# Patient Record
Sex: MALE | Race: WHITE | Employment: FULL TIME | URBAN - METROPOLITAN AREA
[De-identification: names, ages, dates, MRNs, and addresses within clinical notes are randomized per-mention and may not be internally consistent; named-entity substitution may affect disease eponyms.]

---

## 2017-08-03 ENCOUNTER — OFFICE VISIT (OUTPATIENT)
Dept: PHYSICAL THERAPY | Age: 42
End: 2017-08-03
Attending: PHYSICIAN ASSISTANT
Payer: COMMERCIAL

## 2017-08-03 DIAGNOSIS — M47.27 LUMBOSACRAL SPONDYLOSIS WITH RADICULOPATHY: ICD-10-CM

## 2017-08-03 DIAGNOSIS — M54.9 BACK PAIN, UNSPECIFIED BACK LOCATION, UNSPECIFIED BACK PAIN LATERALITY, UNSPECIFIED CHRONICITY: ICD-10-CM

## 2017-08-03 PROCEDURE — 97162 PT EVAL MOD COMPLEX 30 MIN: CPT

## 2017-08-03 PROCEDURE — 97110 THERAPEUTIC EXERCISES: CPT

## 2017-08-03 NOTE — PROGRESS NOTES
SPINE EVALUATION:   Referring Physician: Dr. Kevyn Rosario  Diagnosis: Lumbosacral spondylosis with radiculopathy (M47.27)  Back pain, unspecified back location, unspecified back pain laterality, unspecified chronicity (M54.9)               Comments:  melquiades and R shoulder AC separation. Epilepsy diagnosed at age 15. No seizures since teens, driving. Mild swelling in LE's noted at times.       ASSESSMENT  Pt with constant c/o bilat LE numbness/tingling which was unchanged during eval. Sensation is impaired to li restiction  Special Tests:   SLR: R (-)  L (-)   Babinski (-)              Biggs's Sign (-)    Today’s Treatment and Response:  No complaints. Able to complete exercises.    Patient education provided on back care, posture, pillow support at night, proper or sign and return this letter via fax as soon as possible to [phone]. If you have any questions, please contact me at Dept: [phone]    Sincerely,  Electronically signed by therapist: Meryl Freeman PT    Physician's certification required:  Sergio Teixeira

## 2017-08-09 ENCOUNTER — OFFICE VISIT (OUTPATIENT)
Dept: PHYSICAL THERAPY | Age: 42
End: 2017-08-09
Attending: PHYSICIAN ASSISTANT
Payer: COMMERCIAL

## 2017-08-09 PROCEDURE — 97012 MECHANICAL TRACTION THERAPY: CPT

## 2017-08-09 PROCEDURE — 97112 NEUROMUSCULAR REEDUCATION: CPT

## 2017-08-09 PROCEDURE — 97110 THERAPEUTIC EXERCISES: CPT

## 2017-08-09 NOTE — PROGRESS NOTES
Dx: Lumbosacral spondylosis with radiculopathy (M47.27)  Back pain, unspecified back location, unspecified back pain laterality, unspecified chronicity (M54.9)               Comments:  eval and treat 8 sessions            Authorized # of Visits:  [id number] open/closed         SKTC, DKTC         piriformis lumbar rot stretch 3x each         sidelying lumbar rot mobe grade II 30\" x 4         Intermittent lumbar traction  50/35 lbs  12 mins  60/20 sec                           Skilled Services: pt education, f

## 2017-08-22 ENCOUNTER — OFFICE VISIT (OUTPATIENT)
Dept: PHYSICAL THERAPY | Age: 42
End: 2017-08-22
Attending: FAMILY MEDICINE
Payer: COMMERCIAL

## 2017-08-22 PROCEDURE — 97110 THERAPEUTIC EXERCISES: CPT

## 2017-08-22 PROCEDURE — 97012 MECHANICAL TRACTION THERAPY: CPT

## 2017-08-22 PROCEDURE — 97112 NEUROMUSCULAR REEDUCATION: CPT

## 2017-08-22 NOTE — PROGRESS NOTES
Dx: Lumbosacral spondylosis with radiculopathy (M47.27)  Back pain, unspecified back location, unspecified back pain laterality, unspecified chronicity (M54.9)               Comments:  eval and treat 8 sessions            Authorized # of Visits:  [id number] Tx#: 5/ Date: Tx#: 6/ Date: Tx#: 7/ Date:    Tx#: 8/   Bike x 6' x6'        Stairs 2 flight recip with rail Rhomberg EO/EC, head turns x10        Rocker board  AP x20  ML x20 Rocker board  AP x20  ML x20        Rhomberg stance eyes open/closed Calf st

## 2017-08-24 ENCOUNTER — OFFICE VISIT (OUTPATIENT)
Dept: PHYSICAL THERAPY | Age: 42
End: 2017-08-24
Attending: PHYSICIAN ASSISTANT
Payer: COMMERCIAL

## 2017-08-24 PROCEDURE — 97110 THERAPEUTIC EXERCISES: CPT

## 2017-08-24 PROCEDURE — 97140 MANUAL THERAPY 1/> REGIONS: CPT

## 2017-08-24 PROCEDURE — 97012 MECHANICAL TRACTION THERAPY: CPT

## 2017-08-24 PROCEDURE — 97112 NEUROMUSCULAR REEDUCATION: CPT

## 2017-08-24 NOTE — PROGRESS NOTES
Dx: Lumbosacral spondylosis with radiculopathy (M47.27)  Back pain, unspecified back location, unspecified back pain laterality, unspecified chronicity (M54.9)               Comments:  eval and treat 8 sessions            Authorized # of Visits:  [id number] EO/EC, head turns x10 posture ed back on wall       Rocker board  AP x20  ML x20 Rocker board  AP x20  ML x20 Wall 90/90 holds 10\" x5       Rhomberg stance eyes open/closed Calf stretch off step. 3x each.   Rocker board  AP x25  ML x25       SKTC, DKTC Po

## 2017-08-29 ENCOUNTER — OFFICE VISIT (OUTPATIENT)
Dept: PHYSICAL THERAPY | Age: 42
End: 2017-08-29
Attending: PHYSICIAN ASSISTANT
Payer: COMMERCIAL

## 2017-08-29 PROCEDURE — 97110 THERAPEUTIC EXERCISES: CPT

## 2017-08-29 PROCEDURE — 97012 MECHANICAL TRACTION THERAPY: CPT

## 2017-08-29 PROCEDURE — 97140 MANUAL THERAPY 1/> REGIONS: CPT

## 2017-08-29 PROCEDURE — 97112 NEUROMUSCULAR REEDUCATION: CPT

## 2017-08-29 NOTE — PROGRESS NOTES
Dx: Lumbosacral spondylosis with radiculopathy (M47.27)  Back pain, unspecified back location, unspecified back pain laterality, unspecified chronicity (M54.9)               Comments:  eval and treat 8 sessions            Authorized # of Visits:  [id number] --> met  Able to tolerate sitting at work 30 mins without aggravation of symptoms  --> continued c/o numbness  Able to sleep uninterrupted from pain.   --> met  Improved radicular symptoms 75% with all ADL's.  --> not met    Plan: Pt has completed 5/5 visit reaches  -pect stretch Sciatic nerve glides      sidelying lumbar rot mobe grade II 30\" x 4 Doorway pect stretch.  -single leg lift/TrAb control sidelying lumbar rot mobe  grade III 30\" x 4      Intermittent lumbar traction  50/35 lbs  12 mins  60/20 sec

## 2017-10-19 PROBLEM — M54.16 LUMBAR RADICULITIS: Status: ACTIVE | Noted: 2017-10-19

## 2020-08-06 PROBLEM — M54.16 LUMBAR RADICULITIS: Status: RESOLVED | Noted: 2017-10-19 | Resolved: 2020-08-06

## 2020-09-15 PROBLEM — H81.10 VERTIGO, BENIGN PAROXYSMAL, UNSPECIFIED LATERALITY: Status: ACTIVE | Noted: 2020-09-15

## 2020-11-17 PROBLEM — T88.7XXA MEDICATION SIDE EFFECT: Status: ACTIVE | Noted: 2020-11-17

## 2020-12-01 ENCOUNTER — TELEPHONE (OUTPATIENT)
Dept: SURGERY | Facility: CLINIC | Age: 45
End: 2020-12-01

## 2020-12-01 NOTE — TELEPHONE ENCOUNTER
Pt called stating he was referred to both [facility] & [facility] for \"Abnormal MRI\", please advise who pt should schedule with ([facility], [facility], Onc, etc.); pt awaiting call back

## 2020-12-01 NOTE — TELEPHONE ENCOUNTER
Pt called back, appt scheduled as:    MRA Head: 12/04    Dr. Rajwinder Romo @ Neuro Onc: 12/08 @ 9am - pt did not want to wait until after appt w/[facility], please advise if this is acceptable    Dr. Laura Villarreal: 12/09 @ 1pm

## 2020-12-01 NOTE — TELEPHONE ENCOUNTER
Discussed with provider, Pt needs to have MRA done first, then they should be scheduled to see IR first available then in Neuro Onc as a new patient with next available

## 2020-12-03 ENCOUNTER — TELEPHONE (OUTPATIENT)
Dept: SURGERY | Facility: CLINIC | Age: 45
End: 2020-12-03

## 2020-12-03 NOTE — TELEPHONE ENCOUNTER
Per Dr. Poncho Lozada will be in the OR on 12/8/20, thus patient must be rescheduled. Patient currently scheduled for OV with Dr. Enmanuel Jason on 12/8/20 at 9:00 am, and with Dr. Beck Koroma on 12/9/20 at 1:00 pm.    Routed to [facility] to adjust appointment.

## 2020-12-06 ENCOUNTER — HOSPITAL ENCOUNTER (OUTPATIENT)
Dept: MRI IMAGING | Age: 45
Discharge: HOME | End: 2020-12-06
Attending: Other
Payer: COMMERCIAL

## 2020-12-06 DIAGNOSIS — R90.89 ABNORMAL BRAIN MRI: ICD-10-CM

## 2020-12-06 PROCEDURE — 70546 MR ANGIOGRAPH HEAD W/O&W/DYE: CPT | Performed by: OTHER

## 2020-12-06 PROCEDURE — A9575 INJ GADOTERATE MEGLUMI 0.1ML: HCPCS | Performed by: OTHER

## 2020-12-09 ENCOUNTER — OFFICE VISIT (OUTPATIENT)
Dept: SURGERY | Facility: CLINIC | Age: 45
End: 2020-12-09
Payer: COMMERCIAL

## 2020-12-09 VITALS
WEIGHT: 237 LBS | BODY MASS INDEX: 30.42 KG/M2 | DIASTOLIC BLOOD PRESSURE: 88 MMHG | HEIGHT: 74 IN | HEART RATE: 66 BPM | SYSTOLIC BLOOD PRESSURE: 122 MMHG

## 2020-12-09 DIAGNOSIS — G93.89 RIGHT TEMPORAL LOBE MASS: ICD-10-CM

## 2020-12-09 DIAGNOSIS — G40.109 PARTIAL SYMPTOMATIC EPILEPSY WITH SIMPLE PARTIAL SEIZURES, NOT INTRACTABLE, WITHOUT STATUS EPILEPTICUS (HCC): Primary | ICD-10-CM

## 2020-12-09 DIAGNOSIS — G08 CHRONIC CEREBRAL VENOUS SINUS THROMBOSIS: Primary | ICD-10-CM

## 2020-12-09 PROCEDURE — 99202 OFFICE O/P NEW SF 15 MIN: CPT | Performed by: NEUROLOGICAL SURGERY

## 2020-12-09 PROCEDURE — 3079F DIAST BP 80-89 MM HG: CPT | Performed by: RADIOLOGY

## 2020-12-09 PROCEDURE — 3074F SYST BP LT 130 MM HG: CPT | Performed by: RADIOLOGY

## 2020-12-09 PROCEDURE — 99202 OFFICE O/P NEW SF 15 MIN: CPT | Performed by: RADIOLOGY

## 2020-12-09 PROCEDURE — 3008F BODY MASS INDEX DOCD: CPT | Performed by: RADIOLOGY

## 2020-12-09 RX ORDER — ALUMINUM CHLORIDE 15 G/100ML
SOLUTION TOPICAL
COMMUNITY
Start: 2020-08-11 | End: 2020-12-09

## 2020-12-09 NOTE — PROGRESS NOTES
Patient with hx of seizures here for consult regarding abnormal MRI brain. MRA head on 12/06/2020. Notes recent dilantin toxicity, has changed medication to keppra which has been working well for him.     Review of Systems:    Hand Dominance: right  General

## 2020-12-09 NOTE — PROGRESS NOTES
39yo hx epilepsy, one GTCS but mostly complex partial seizures since 13yo but no record of remote MR/CT head to review. Has been on dilantin for decades. No recent headaches, change in seizures or neurolgical deficit.  He established care with a neurologist Solution, apply externally once daily (give 2 bottles), Disp: 35 mL, Rfl: 6    No current facility-administered medications on file prior to visit.        Tegretol Florentinus.Paradise*        Social History    Socioeconomic History      Marital status:  MRA head in 3 months to make sure no progressive thrombosis and screen for dAVF. If stable then would do an annual MRA head to screen for dAVF. Given his other issue, seeing Neurosurgery for R temporal mass, he may need other work up.  Therefore I will a

## 2020-12-09 NOTE — H&P
Janet  Neurosurgery Consult    REASON FOR CONSULTATION:  Right temporal mass    HISTORY OF PRESENT ILLNESS:  Saqib  is a(n) 39year old RH male with a history of epilepsy since age 15.  Currently seeing Dr. Erica Cronin for transit is intact. Tongue is midline. Uvula and palate elevate symmetrically.      Motor: 5/5 x 4, no drift   Sensation: intact to light touch bilaterally     Reflexes: 2+   Coordination: FNF intact   Gait: normal       DIAGNOSTIC DATA:      IMAGING:  MRI brain wi

## 2020-12-16 ENCOUNTER — PATIENT MESSAGE (OUTPATIENT)
Dept: SURGERY | Facility: CLINIC | Age: 45
End: 2020-12-16

## 2020-12-16 NOTE — TELEPHONE ENCOUNTER
From: Faustino Coyne  To: Venecia Otero MD  Sent: 12/16/2020 1:35 PM CST  Subject: Visit Follow-up Question    Just wanted to see if you will be in the office on March 3rd.  Appointment set with Dr. Stas Pickett and was hoping to see you immediately before [facility]

## 2021-01-15 PROBLEM — G08: Status: ACTIVE | Noted: 2021-01-15

## 2021-01-19 ENCOUNTER — TELEPHONE (OUTPATIENT)
Dept: SURGERY | Facility: CLINIC | Age: 46
End: 2021-01-19

## 2021-02-15 ENCOUNTER — PATIENT MESSAGE (OUTPATIENT)
Dept: SURGERY | Facility: CLINIC | Age: 46
End: 2021-02-15

## 2021-02-16 ENCOUNTER — TELEPHONE (OUTPATIENT)
Dept: SURGERY | Facility: CLINIC | Age: 46
End: 2021-02-16

## 2021-02-16 NOTE — TELEPHONE ENCOUNTER
Jazmine with pt's insurance called stating pt will be completing MRI & MRA @ [facility]; Jazmine requested orders to be faxed to [facility] @ [phone]

## 2021-02-17 NOTE — TELEPHONE ENCOUNTER
Asked pt via [employer] to bring CD of imaging to apt with Dr Wayne Ramirez next week for Dr Cristo Gregg to review.

## 2021-02-17 NOTE — TELEPHONE ENCOUNTER
From: Komal Zamudio  To: Marina Narvaez MD  Sent: 2/15/2021 7:14 PM CST  Subject: Non-Urgent Medical Question    I am getting scans this Friday and was told you won't be available until March to discuss. Please speak to Dr. Emily Sue.  Reach out to me via p

## 2021-02-22 ENCOUNTER — TELEPHONE (OUTPATIENT)
Dept: SURGERY | Facility: CLINIC | Age: 46
End: 2021-02-22

## 2021-02-22 DIAGNOSIS — D49.6 BRAIN TUMOR (HCC): Primary | ICD-10-CM

## 2021-02-22 NOTE — TELEPHONE ENCOUNTER
You are scheduled for Right craniotomy for tumor resection on 3/11/21 with     Pre-op instructions discussed with patient and surgical packet provided:    · PCP clearance is needed.   We have faxed a letter requesting medical clearance to their of surgery. · Our office will get prior authorization for your surgery. · You will be in the Intensive Care Unit overnight, it is an average 3-5 days inpatient stay.     · You can expect to have some facial swelling on side of jerome

## 2021-02-22 NOTE — TELEPHONE ENCOUNTER
Report only of MRI Brain WWO, and MRA Brain received, both dated 02/20/21, endorsed to provider for review

## 2021-02-22 NOTE — TELEPHONE ENCOUNTER
Patient is scheduled for right craniotomy for tumor resection on 3/11/21 with Dr Wayne Ramirez.     X Surgery order signed   X Placed sx on surgery sheet  X Placed on outlook calendar  X [employer] message sent to patient with sx instructions  X Faxed pre-op trudy

## 2021-02-23 NOTE — TELEPHONE ENCOUNTER
Peterson Hallman and spoke with [employer] in the PA department. [phone].     Prior authorization request completed for: right craniotomy for tumor resection  Authorization #[id number]  Authorization dates: 3/11/21 - 3/14/21  CPT codes approved: 79940,65351,5

## 2021-02-24 ENCOUNTER — OFFICE VISIT (OUTPATIENT)
Dept: SURGERY | Facility: CLINIC | Age: 46
End: 2021-02-24
Payer: COMMERCIAL

## 2021-02-24 VITALS
DIASTOLIC BLOOD PRESSURE: 70 MMHG | SYSTOLIC BLOOD PRESSURE: 112 MMHG | WEIGHT: 200 LBS | HEART RATE: 64 BPM | BODY MASS INDEX: 25.67 KG/M2 | HEIGHT: 74 IN

## 2021-02-24 DIAGNOSIS — G93.89 RIGHT TEMPORAL LOBE MASS: Primary | ICD-10-CM

## 2021-02-24 PROCEDURE — 99213 OFFICE O/P EST LOW 20 MIN: CPT | Performed by: NEUROLOGICAL SURGERY

## 2021-02-24 PROCEDURE — 3078F DIAST BP <80 MM HG: CPT | Performed by: NEUROLOGICAL SURGERY

## 2021-02-24 PROCEDURE — 3074F SYST BP LT 130 MM HG: CPT | Performed by: NEUROLOGICAL SURGERY

## 2021-02-24 PROCEDURE — 3008F BODY MASS INDEX DOCD: CPT | Performed by: NEUROLOGICAL SURGERY

## 2021-02-24 NOTE — PROGRESS NOTES
[facility]  Neurosurgery Clinic Visit      HISTORY OF PRESENT ILLNESS:  Emiliano Sanders is a(n) 39year old male with epilepsy, right temporal tumor here for follow-up.   Since his last visit, he has had only a few days of headache when h drift   Sensation: intact to light touch bilaterally     Reflexes: deferred   Coordination: FNF intact   Gait: normal       DIAGNOSTIC DATA:      IMAGING:  MRI brain with stable cystic anterior right temporal lesion, 1.7 x 1.5 x 0.9 cm with no surrounding

## 2021-02-24 NOTE — PROGRESS NOTES
Patient here for follow-up and imaging review. Discs dropped off at . EEG completed. Here for next steps regarding surgery on 03/11.

## 2021-02-27 ENCOUNTER — PATIENT MESSAGE (OUTPATIENT)
Dept: SURGERY | Facility: CLINIC | Age: 46
End: 2021-02-27

## 2021-03-01 NOTE — TELEPHONE ENCOUNTER
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  The S

## 2021-03-01 NOTE — TELEPHONE ENCOUNTER
From: [facility]  To: Violet Chaparro MD  Sent: 2/27/2021 7:32 AM CST  Subject: Visit Follow-up Question    Please disregard previous message.  I have an appointment scheduled with Dr. Shilo Pedro March 8th and was planning on canceling because I have a Covid

## 2021-03-02 DIAGNOSIS — D49.6 BRAIN TUMOR (HCC): Primary | ICD-10-CM

## 2021-03-02 DIAGNOSIS — Z01.818 PREOP TESTING: ICD-10-CM

## 2021-03-03 NOTE — TELEPHONE ENCOUNTER
Per PCP- on 3/2/21: Carlos Rangel is a 39year old male who presents for a pre-operative physical exam. Patient is to have right craniotomy, to be done by Dr. Marialuisa Roy at [facility] on 3/11/2021. Pt has the following conditions: Seizure.  Pt

## 2021-03-03 NOTE — PROGRESS NOTES
Per : \"Discussed with patient, he will have MRI with stealth protocol done preop. \"    MRI currently scheduled for 3/10/21. Nothing further needed.

## 2021-03-03 NOTE — TELEPHONE ENCOUNTER
Jose Manuel Michelle, according to Dr. Grey Savage note he was to also get an MRA of the brain with his MRI. I only see the MRI completed. Can you check with patient and make sure he has it completed. If he needs orders let me know.

## 2021-03-05 ENCOUNTER — TELEPHONE (OUTPATIENT)
Dept: NEUROLOGY | Facility: CLINIC | Age: 46
End: 2021-03-05

## 2021-03-05 NOTE — TELEPHONE ENCOUNTER
Renu Ching for peer to peer     Spoke with Carter     Peer to peer schedule for 3/8/21 at [place] [address] updated

## 2021-03-08 ENCOUNTER — LAB ENCOUNTER (OUTPATIENT)
Dept: LAB | Age: 46
End: 2021-03-08
Attending: NEUROLOGICAL SURGERY
Payer: COMMERCIAL

## 2021-03-08 DIAGNOSIS — D49.6 BRAIN TUMOR (HCC): ICD-10-CM

## 2021-03-09 LAB — SARS-COV-2 RNA RESP QL NAA+PROBE: NOT DETECTED

## 2021-03-10 ENCOUNTER — ANESTHESIA EVENT (OUTPATIENT)
Dept: SURGERY | Facility: HOSPITAL | Age: 46
DRG: 027 | End: 2021-03-10
Payer: COMMERCIAL

## 2021-03-10 ENCOUNTER — HOSPITAL ENCOUNTER (OUTPATIENT)
Dept: MRI IMAGING | Facility: HOSPITAL | Age: 46
Discharge: HOME | End: 2021-03-10
Attending: NEUROLOGICAL SURGERY
Payer: COMMERCIAL

## 2021-03-10 DIAGNOSIS — Z01.818 PREOP TESTING: ICD-10-CM

## 2021-03-10 DIAGNOSIS — G93.89 RIGHT TEMPORAL LOBE MASS: ICD-10-CM

## 2021-03-10 DIAGNOSIS — G40.109 PARTIAL SYMPTOMATIC EPILEPSY WITH SIMPLE PARTIAL SEIZURES, NOT INTRACTABLE, WITHOUT STATUS EPILEPTICUS (HCC): ICD-10-CM

## 2021-03-10 DIAGNOSIS — D49.6 BRAIN TUMOR (HCC): ICD-10-CM

## 2021-03-10 PROCEDURE — A9575 INJ GADOTERATE MEGLUMI 0.1ML: HCPCS | Performed by: NEUROLOGICAL SURGERY

## 2021-03-10 PROCEDURE — 70553 MRI BRAIN STEM W/O & W/DYE: CPT | Performed by: NEUROLOGICAL SURGERY

## 2021-03-10 NOTE — ANESTHESIA PREPROCEDURE EVALUATION
PRE-OP EVALUATION    Patient Name: [facility]    Pre-op Diagnosis: Brain tumor ([facility] [address] [D49.6]    Procedure(s):  Right craniotomy for tumor resection      Surgeon(s) and Role:     Yaneth Ogden MD - Primary    Pre-op vitals reviewed.         Body mass  02/23/2021    CO2 22.7 02/23/2021    BUN 19.0 02/23/2021    CREATSERUM 0.62 (L) 02/23/2021    GLU 86 02/23/2021    CA 9.9 02/23/2021     Lab Results   Component Value Date    INR 1.0 02/23/2021         Airway      Mallampati: II  Mouth opening: >

## 2021-03-11 ENCOUNTER — ANESTHESIA (OUTPATIENT)
Dept: SURGERY | Facility: HOSPITAL | Age: 46
DRG: 027 | End: 2021-03-11
Payer: COMMERCIAL

## 2021-03-11 ENCOUNTER — HOSPITAL ENCOUNTER (INPATIENT)
Facility: HOSPITAL | Age: 46
LOS: 2 days | Discharge: HOME | DRG: 027 | End: 2021-03-13
Attending: NEUROLOGICAL SURGERY | Admitting: NEUROLOGICAL SURGERY
Payer: COMMERCIAL

## 2021-03-11 DIAGNOSIS — D49.6 BRAIN TUMOR (HCC): Primary | ICD-10-CM

## 2021-03-11 LAB
ANTIBODY SCREEN: NEGATIVE
RH BLOOD TYPE: NEGATIVE

## 2021-03-11 PROCEDURE — 76942 ECHO GUIDE FOR BIOPSY: CPT | Performed by: ANESTHESIOLOGY

## 2021-03-11 PROCEDURE — 3079F DIAST BP 80-89 MM HG: CPT | Performed by: INTERNAL MEDICINE

## 2021-03-11 PROCEDURE — 00B70ZZ EXCISION OF CEREBRAL HEMISPHERE, OPEN APPROACH: ICD-10-PCS | Performed by: NEUROLOGICAL SURGERY

## 2021-03-11 PROCEDURE — 3074F SYST BP LT 130 MM HG: CPT | Performed by: INTERNAL MEDICINE

## 2021-03-11 PROCEDURE — P9045 ALBUMIN (HUMAN), 5%, 250 ML: HCPCS | Performed by: ANESTHESIOLOGY

## 2021-03-11 PROCEDURE — 99291 CRITICAL CARE FIRST HOUR: CPT | Performed by: INTERNAL MEDICINE

## 2021-03-11 PROCEDURE — 3008F BODY MASS INDEX DOCD: CPT | Performed by: INTERNAL MEDICINE

## 2021-03-11 PROCEDURE — 8E09XBZ COMPUTER ASSISTED PROCEDURE OF HEAD AND NECK REGION: ICD-10-PCS | Performed by: NEUROLOGICAL SURGERY

## 2021-03-11 DEVICE — PATCH DURAL DURAMATRIX 2X2: Type: IMPLANTABLE DEVICE | Site: HEAD | Status: FUNCTIONAL

## 2021-03-11 DEVICE — SCREW, AXS, SELF-DRILLING
Type: IMPLANTABLE DEVICE | Site: HEAD | Status: FUNCTIONAL
Brand: UNIVERSAL NEURO 3

## 2021-03-11 DEVICE — BURR HOLE COVER, WITH TAB, 10MM
Type: IMPLANTABLE DEVICE | Site: HEAD | Status: FUNCTIONAL
Brand: UNIVERSAL NEURO 3

## 2021-03-11 RX ORDER — PANTOPRAZOLE SODIUM 40 MG/1
40 TABLET, DELAYED RELEASE ORAL
Status: DISCONTINUED | OUTPATIENT
Start: 2021-03-11 | End: 2021-03-11

## 2021-03-11 RX ORDER — SODIUM CHLORIDE, SODIUM LACTATE, POTASSIUM CHLORIDE, CALCIUM CHLORIDE 600; 310; 30; 20 MG/100ML; MG/100ML; MG/100ML; MG/100ML
INJECTION, SOLUTION INTRAVENOUS CONTINUOUS
Status: DISCONTINUED | OUTPATIENT
Start: 2021-03-11 | End: 2021-03-11

## 2021-03-11 RX ORDER — SODIUM CHLORIDE 9 MG/ML
INJECTION, SOLUTION INTRAVENOUS CONTINUOUS
Status: DISCONTINUED | OUTPATIENT
Start: 2021-03-11 | End: 2021-03-11

## 2021-03-11 RX ORDER — HYDROCODONE BITARTRATE AND ACETAMINOPHEN 5; 325 MG/1; MG/1
1 TABLET ORAL EVERY 4 HOURS PRN
Status: DISCONTINUED | OUTPATIENT
Start: 2021-03-11 | End: 2021-03-13

## 2021-03-11 RX ORDER — ALBUMIN, HUMAN INJ 5% 5 %
SOLUTION INTRAVENOUS CONTINUOUS PRN
Status: DISCONTINUED | OUTPATIENT
Start: 2021-03-11 | End: 2021-03-11 | Stop reason: SURG

## 2021-03-11 RX ORDER — SODIUM CHLORIDE 0.9 % (FLUSH) 0.9 %
SYRINGE (ML) INJECTION AS NEEDED
Status: DISCONTINUED | OUTPATIENT
Start: 2021-03-11 | End: 2021-03-11 | Stop reason: HOSPADM

## 2021-03-11 RX ORDER — ACETAMINOPHEN 500 MG
1000 TABLET ORAL ONCE AS NEEDED
Status: DISCONTINUED | OUTPATIENT
Start: 2021-03-11 | End: 2021-03-11 | Stop reason: ALTCHOICE

## 2021-03-11 RX ORDER — HYDROCODONE BITARTRATE AND ACETAMINOPHEN 5; 325 MG/1; MG/1
2 TABLET ORAL EVERY 4 HOURS PRN
Status: DISCONTINUED | OUTPATIENT
Start: 2021-03-11 | End: 2021-03-13

## 2021-03-11 RX ORDER — HYDROMORPHONE HYDROCHLORIDE 1 MG/ML
0.2 INJECTION, SOLUTION INTRAMUSCULAR; INTRAVENOUS; SUBCUTANEOUS EVERY 2 HOUR PRN
Status: DISCONTINUED | OUTPATIENT
Start: 2021-03-11 | End: 2021-03-13

## 2021-03-11 RX ORDER — HYDROMORPHONE HYDROCHLORIDE 1 MG/ML
0.8 INJECTION, SOLUTION INTRAMUSCULAR; INTRAVENOUS; SUBCUTANEOUS EVERY 2 HOUR PRN
Status: DISCONTINUED | OUTPATIENT
Start: 2021-03-11 | End: 2021-03-13

## 2021-03-11 RX ORDER — DOCUSATE SODIUM 100 MG/1
100 CAPSULE, LIQUID FILLED ORAL 2 TIMES DAILY
Status: DISCONTINUED | OUTPATIENT
Start: 2021-03-11 | End: 2021-03-11

## 2021-03-11 RX ORDER — FLUTICASONE PROPIONATE 50 MCG
2 SPRAY, SUSPENSION (ML) NASAL DAILY
Status: DISCONTINUED | OUTPATIENT
Start: 2021-03-12 | End: 2021-03-13

## 2021-03-11 RX ORDER — MANNITOL 20 G/100ML
INJECTION, SOLUTION INTRAVENOUS AS NEEDED
Status: DISCONTINUED | OUTPATIENT
Start: 2021-03-11 | End: 2021-03-11 | Stop reason: SURG

## 2021-03-11 RX ORDER — CEFAZOLIN SODIUM/WATER 2 G/20 ML
2 SYRINGE (ML) INTRAVENOUS ONCE
Status: COMPLETED | OUTPATIENT
Start: 2021-03-11 | End: 2021-03-11

## 2021-03-11 RX ORDER — LABETALOL HYDROCHLORIDE 5 MG/ML
5 INJECTION, SOLUTION INTRAVENOUS EVERY 5 MIN PRN
Status: ACTIVE | OUTPATIENT
Start: 2021-03-11 | End: 2021-03-11

## 2021-03-11 RX ORDER — ACETAMINOPHEN 500 MG
1000 TABLET ORAL ONCE
Status: DISCONTINUED | OUTPATIENT
Start: 2021-03-11 | End: 2021-03-11 | Stop reason: ALTCHOICE

## 2021-03-11 RX ORDER — BACITRACIN 50000 [USP'U]/1
INJECTION, POWDER, LYOPHILIZED, FOR SOLUTION INTRAMUSCULAR AS NEEDED
Status: DISCONTINUED | OUTPATIENT
Start: 2021-03-11 | End: 2021-03-11 | Stop reason: HOSPADM

## 2021-03-11 RX ORDER — ACETAMINOPHEN 500 MG
1000 TABLET ORAL EVERY 6 HOURS PRN
COMMUNITY
End: 2021-03-26 | Stop reason: ALTCHOICE

## 2021-03-11 RX ORDER — DEXAMETHASONE SODIUM PHOSPHATE 4 MG/ML
VIAL (ML) INJECTION AS NEEDED
Status: DISCONTINUED | OUTPATIENT
Start: 2021-03-11 | End: 2021-03-11 | Stop reason: SURG

## 2021-03-11 RX ORDER — LEVETIRACETAM 500 MG/1
1000 TABLET ORAL 2 TIMES DAILY
Status: DISCONTINUED | OUTPATIENT
Start: 2021-03-11 | End: 2021-03-13

## 2021-03-11 RX ORDER — NALOXONE HYDROCHLORIDE 0.4 MG/ML
80 INJECTION, SOLUTION INTRAMUSCULAR; INTRAVENOUS; SUBCUTANEOUS AS NEEDED
Status: ACTIVE | OUTPATIENT
Start: 2021-03-11 | End: 2021-03-11

## 2021-03-11 RX ORDER — LEVETIRACETAM 500 MG/5ML
1000 INJECTION, SOLUTION, CONCENTRATE INTRAVENOUS ONCE
Status: DISCONTINUED | OUTPATIENT
Start: 2021-03-11 | End: 2021-03-11

## 2021-03-11 RX ORDER — HYDRALAZINE HYDROCHLORIDE 20 MG/ML
10 INJECTION INTRAMUSCULAR; INTRAVENOUS EVERY 4 HOURS PRN
Status: DISCONTINUED | OUTPATIENT
Start: 2021-03-11 | End: 2021-03-13

## 2021-03-11 RX ORDER — LIDOCAINE HYDROCHLORIDE AND EPINEPHRINE 10; 10 MG/ML; UG/ML
INJECTION, SOLUTION INFILTRATION; PERINEURAL AS NEEDED
Status: DISCONTINUED | OUTPATIENT
Start: 2021-03-11 | End: 2021-03-11 | Stop reason: HOSPADM

## 2021-03-11 RX ORDER — ONDANSETRON 2 MG/ML
4 INJECTION INTRAMUSCULAR; INTRAVENOUS EVERY 6 HOURS PRN
Status: DISCONTINUED | OUTPATIENT
Start: 2021-03-11 | End: 2021-03-13

## 2021-03-11 RX ORDER — ONDANSETRON 2 MG/ML
4 INJECTION INTRAMUSCULAR; INTRAVENOUS AS NEEDED
Status: ACTIVE | OUTPATIENT
Start: 2021-03-11 | End: 2021-03-11

## 2021-03-11 RX ORDER — ONDANSETRON 2 MG/ML
INJECTION INTRAMUSCULAR; INTRAVENOUS AS NEEDED
Status: DISCONTINUED | OUTPATIENT
Start: 2021-03-11 | End: 2021-03-11 | Stop reason: SURG

## 2021-03-11 RX ORDER — HYDROCODONE BITARTRATE AND ACETAMINOPHEN 5; 325 MG/1; MG/1
1 TABLET ORAL AS NEEDED
Status: DISCONTINUED | OUTPATIENT
Start: 2021-03-11 | End: 2021-03-13

## 2021-03-11 RX ORDER — METOCLOPRAMIDE HYDROCHLORIDE 5 MG/ML
INJECTION INTRAMUSCULAR; INTRAVENOUS AS NEEDED
Status: DISCONTINUED | OUTPATIENT
Start: 2021-03-11 | End: 2021-03-11 | Stop reason: SURG

## 2021-03-11 RX ORDER — SODIUM CHLORIDE, SODIUM LACTATE, POTASSIUM CHLORIDE, CALCIUM CHLORIDE 600; 310; 30; 20 MG/100ML; MG/100ML; MG/100ML; MG/100ML
INJECTION, SOLUTION INTRAVENOUS CONTINUOUS PRN
Status: DISCONTINUED | OUTPATIENT
Start: 2021-03-11 | End: 2021-03-11

## 2021-03-11 RX ORDER — MIDAZOLAM HYDROCHLORIDE 1 MG/ML
INJECTION INTRAMUSCULAR; INTRAVENOUS AS NEEDED
Status: DISCONTINUED | OUTPATIENT
Start: 2021-03-11 | End: 2021-03-11 | Stop reason: SURG

## 2021-03-11 RX ORDER — MEPERIDINE HYDROCHLORIDE 25 MG/ML
12.5 INJECTION INTRAMUSCULAR; INTRAVENOUS; SUBCUTANEOUS AS NEEDED
Status: DISCONTINUED | OUTPATIENT
Start: 2021-03-11 | End: 2021-03-13

## 2021-03-11 RX ORDER — DIAZEPAM 5 MG/ML
2 INJECTION, SOLUTION INTRAMUSCULAR; INTRAVENOUS EVERY 6 HOURS PRN
Status: DISCONTINUED | OUTPATIENT
Start: 2021-03-11 | End: 2021-03-13

## 2021-03-11 RX ORDER — HYDROMORPHONE HYDROCHLORIDE 1 MG/ML
0.4 INJECTION, SOLUTION INTRAMUSCULAR; INTRAVENOUS; SUBCUTANEOUS EVERY 5 MIN PRN
Status: DISPENSED | OUTPATIENT
Start: 2021-03-11 | End: 2021-03-11

## 2021-03-11 RX ORDER — HYDROCODONE BITARTRATE AND ACETAMINOPHEN 5; 325 MG/1; MG/1
2 TABLET ORAL AS NEEDED
Status: DISCONTINUED | OUTPATIENT
Start: 2021-03-11 | End: 2021-03-13

## 2021-03-11 RX ORDER — HYDROMORPHONE HYDROCHLORIDE 1 MG/ML
0.4 INJECTION, SOLUTION INTRAMUSCULAR; INTRAVENOUS; SUBCUTANEOUS EVERY 2 HOUR PRN
Status: DISCONTINUED | OUTPATIENT
Start: 2021-03-11 | End: 2021-03-13

## 2021-03-11 RX ORDER — LIDOCAINE HYDROCHLORIDE 10 MG/ML
INJECTION, SOLUTION EPIDURAL; INFILTRATION; INTRACAUDAL; PERINEURAL AS NEEDED
Status: DISCONTINUED | OUTPATIENT
Start: 2021-03-11 | End: 2021-03-11 | Stop reason: SURG

## 2021-03-11 RX ORDER — REMIFENTANIL HYDROCHLORIDE 1 MG/ML
INJECTION, POWDER, LYOPHILIZED, FOR SOLUTION INTRAVENOUS AS NEEDED
Status: DISCONTINUED | OUTPATIENT
Start: 2021-03-11 | End: 2021-03-11 | Stop reason: SURG

## 2021-03-11 RX ORDER — ROCURONIUM BROMIDE 10 MG/ML
INJECTION, SOLUTION INTRAVENOUS AS NEEDED
Status: DISCONTINUED | OUTPATIENT
Start: 2021-03-11 | End: 2021-03-11 | Stop reason: SURG

## 2021-03-11 RX ORDER — LABETALOL HYDROCHLORIDE 5 MG/ML
10 INJECTION, SOLUTION INTRAVENOUS AS NEEDED
Status: DISCONTINUED | OUTPATIENT
Start: 2021-03-11 | End: 2021-03-13

## 2021-03-11 RX ORDER — DEXAMETHASONE SODIUM PHOSPHATE 4 MG/ML
4 VIAL (ML) INJECTION AS NEEDED
Status: ACTIVE | OUTPATIENT
Start: 2021-03-11 | End: 2021-03-11

## 2021-03-11 RX ORDER — CEFAZOLIN SODIUM/WATER 2 G/20 ML
2 SYRINGE (ML) INTRAVENOUS EVERY 8 HOURS
Status: COMPLETED | OUTPATIENT
Start: 2021-03-11 | End: 2021-03-12

## 2021-03-11 RX ORDER — SODIUM CHLORIDE 9 MG/ML
INJECTION, SOLUTION INTRAVENOUS CONTINUOUS
Status: DISCONTINUED | OUTPATIENT
Start: 2021-03-11 | End: 2021-03-13

## 2021-03-11 RX ADMIN — ALBUMIN, HUMAN INJ 5%: 5 SOLUTION INTRAVENOUS at 10:40:00

## 2021-03-11 RX ADMIN — ONDANSETRON 4 MG: 2 INJECTION INTRAMUSCULAR; INTRAVENOUS at 12:40:00

## 2021-03-11 RX ADMIN — CEFAZOLIN SODIUM/WATER 2 G: 2 G/20 ML SYRINGE (ML) INTRAVENOUS at 11:59:00

## 2021-03-11 RX ADMIN — ROCURONIUM BROMIDE 10 MG: 10 INJECTION, SOLUTION INTRAVENOUS at 10:37:00

## 2021-03-11 RX ADMIN — DEXAMETHASONE SODIUM PHOSPHATE 8 MG: 4 MG/ML VIAL (ML) INJECTION at 08:21:00

## 2021-03-11 RX ADMIN — ROCURONIUM BROMIDE 50 MG: 10 INJECTION, SOLUTION INTRAVENOUS at 07:34:00

## 2021-03-11 RX ADMIN — CEFAZOLIN SODIUM/WATER 2 G: 2 G/20 ML SYRINGE (ML) INTRAVENOUS at 08:00:00

## 2021-03-11 RX ADMIN — MANNITOL 125 ML: 20 INJECTION, SOLUTION INTRAVENOUS at 08:00:00

## 2021-03-11 RX ADMIN — METOCLOPRAMIDE HYDROCHLORIDE 10 MG: 5 INJECTION INTRAMUSCULAR; INTRAVENOUS at 08:00:00

## 2021-03-11 RX ADMIN — ROCURONIUM BROMIDE 20 MG: 10 INJECTION, SOLUTION INTRAVENOUS at 09:10:00

## 2021-03-11 RX ADMIN — REMIFENTANIL HYDROCHLORIDE 100 MCG: 1 INJECTION, POWDER, LYOPHILIZED, FOR SOLUTION INTRAVENOUS at 07:52:00

## 2021-03-11 RX ADMIN — LIDOCAINE HYDROCHLORIDE 100 MG: 10 INJECTION, SOLUTION EPIDURAL; INFILTRATION; INTRACAUDAL; PERINEURAL at 07:29:00

## 2021-03-11 RX ADMIN — SODIUM CHLORIDE: 9 INJECTION, SOLUTION INTRAVENOUS at 11:40:00

## 2021-03-11 RX ADMIN — LIDOCAINE HYDROCHLORIDE 50 MG: 10 INJECTION, SOLUTION EPIDURAL; INFILTRATION; INTRACAUDAL; PERINEURAL at 12:40:00

## 2021-03-11 RX ADMIN — SODIUM CHLORIDE: 9 INJECTION, SOLUTION INTRAVENOUS at 13:15:00

## 2021-03-11 RX ADMIN — SODIUM CHLORIDE: 9 INJECTION, SOLUTION INTRAVENOUS at 07:29:00

## 2021-03-11 RX ADMIN — ROCURONIUM BROMIDE 20 MG: 10 INJECTION, SOLUTION INTRAVENOUS at 08:28:00

## 2021-03-11 RX ADMIN — ONDANSETRON 4 MG: 2 INJECTION INTRAMUSCULAR; INTRAVENOUS at 08:00:00

## 2021-03-11 RX ADMIN — MIDAZOLAM HYDROCHLORIDE 2 MG: 1 INJECTION INTRAMUSCULAR; INTRAVENOUS at 07:29:00

## 2021-03-11 RX ADMIN — SODIUM CHLORIDE: 9 INJECTION, SOLUTION INTRAVENOUS at 09:40:00

## 2021-03-11 NOTE — H&P
Neurosurgery Pre-OP H&P  3/11/2021    Batsheva Mazariegos PCP:  Cayetano Johnston DO    1975 MRN [id number]       HISTORY OF PRESENT ILLNESS:  Batsheva Mazariegos is a(n) 39year old male with epilepsy, right temporal tumor here for follow-up.   Since his last vis skin.     MEDICATIONS:    Prescriptions Prior to Admission   (Not in a hospital admission)      No current facility-administered medications for this visit.         REVIEW OF SYSTEMS:  A 10-point system was reviewed.   Pertinent positives and negatives are [address]  3/11/2021  7:18 AM

## 2021-03-11 NOTE — ANESTHESIA PROCEDURE NOTES
Arterial Line  Performed by: Iris Villanueva MD  Authorized by: Iris Villanueva MD     General Information and Staff    Procedure Start:  3/11/2021 7:40 AM  Procedure End:  3/11/2021 7:41 AM  Anesthesiologist:  Iris Villanueva MD  Perfo

## 2021-03-11 NOTE — ANESTHESIA PROCEDURE NOTES
Airway  Urgency: elective      General Information and Staff    Patient location during procedure: OR  Anesthesiologist: Woodrow Loaiza MD  Performed: anesthesiologist     Indications and Patient Condition  Indications for airway management: anesthe

## 2021-03-11 NOTE — BRIEF OP NOTE
Pre-Operative Diagnosis: Brain tumor ([facility] [address] [D49.6]     Post-Operative Diagnosis: Brain tumor ([facility] [address] [D49.6]      Procedure Performed:   Procedure(s):  Right craniotomy for tumor resection      Surgeon(s) and Role:     Lorena Menchaca MD - Primary    Assist

## 2021-03-11 NOTE — CONSULTS
[address] [place] [address] Patient Status:  Inpatient    1975 MRN [id number]   [facility] 6NE-A Attending Gretchen Segovia MD   Hosp Day # 0 PCP Ansley Burns DO     Chief Complaint: Brain Tumor    History of 2 (two) times daily. , Disp: 60 tablet, Rfl: 3        Review of Systems:   A comprehensive 14 point review of systems was completed. Pertinent positives and negatives noted in the HPI.     Physical Exam:    /89   Pulse 81   Temp 98.1 °F (36.7 °C) (T Epilepsy  -keppra    Quality:  · DVT Prophylaxis: scd    Plan of care discussed with patient and wife    Dispo: no discharge    Meena Fishman MD  [facility] Hospitalist  [phone]

## 2021-03-11 NOTE — ANESTHESIA PROCEDURE NOTES
Peripheral IV  Inserted by: Pacheco Hernandez MD    Placement  Needle size: 18 G  Laterality: right  Location: hand  Local anesthetic: none  Site prep: alcohol  Technique: anatomical landmarks  Attempts: 1

## 2021-03-11 NOTE — PLAN OF CARE
Received patient from OR S/P Right craniotomy with tumor resection. Patient is alert and oriented, incision is C/D/I with no hematoma. Neuro assessments are intact, patient MOSS's with equal strength. Seizure precautions in place.  Patient is on room air, jerica

## 2021-03-11 NOTE — ANESTHESIA POSTPROCEDURE EVALUATION
[address] [place] [address] Patient Status:  Inpatient   Age/Gender 39year old male MRN [id number]   [facility] 6NE-A Attending Micah Munson MD   Hosp Day # 0 PCP Gerardo Gomez DO       Anesthesia Post-op Note    Procedure(s):  Right

## 2021-03-11 NOTE — SLP NOTE
ADULT SWALLOWING EVALUATION    ASSESSMENT    ASSESSMENT/OVERALL IMPRESSION:  Patient is a 39year-old male who was admitted to [facility] 3/11/2021 d/t right craniotomy for resection of tumor on right temporal lobe. PMHx includes epilepsy.  Patient was HISTORY  Current Diet Consistency: Regular; Thin liquids  Dysphagia History: None reported   Imaging Results:   MRI BRAIN from 3/10/2021  CONCLUSION:         1. No acute intracranial abnormality identified.       2.  There are stable cystic changes with trac aspiration cannot be evaluated clinically.  Videofluoroscopic Swallow Study is required to rule-out silent aspiration.)    Esophageal Phase of Swallow: No complaints consistent with possible esophageal involvement                GOALS  Goal #1 Patient will

## 2021-03-11 NOTE — CONSULTS
[facility]  Neurocritical Care Consult Note    Zahira Geiger Patient Status:  Inpatient    1975 MRN [id number]   [facility] 6NE-A Attending Nora Lawrence MD   Hosp Day # 0 PCP Jerica Gonzalez DO       Reason for Con Packs/day: 0.50        Years: 5.00        Pack years: 2.5        Types: Cigarettes      Smokeless tobacco: Never Used    Vaping Use      Vaping Use: Never used    Substance and Sexual Activity      Alcohol use: Yes        Comment: Socially      Drug us Phosphate (DECADRON) 4 MG/ML injection 4 mg, 4 mg, Intravenous, PRN  Trimethobenzamide HCl (TIGAN) injection 200 mg, 200 mg, Intramuscular, PRN  Meperidine HCl (DEMEROL) 25 MG/ML injection 12.5 mg, 12.5 mg, Intravenous, PRN  [START ON 3/12/2021] Fluticason areas.  Neuro: As per HPI    All other systems were reviewed and are negative.     Vitals:   Temp:  [97 °F (36.1 °C)-98.5 °F (36.9 °C)] 98.1 °F (36.7 °C)  Pulse:  [68-90] 78  Resp:  [11-24] 11  BP: (104-141)/(68-93) 135/91  AO: (141-156)/(6 Neurosciences  Chief, Section of [address]

## 2021-03-11 NOTE — OPERATIVE REPORT
[facility]    OPERATIVE REPORT    Patient:  Emiliano Sanders;  YOB: 1975     CSN:  [id number]; Medical Record Number:  [id number]    Admission Date:  3/11/2021 Operation Date:  3/11/2021    . ..........................     Operating Physician incision was reconfirmed, and the procedure initiated. Navigation was utilized intermittently during the procedure as necessary to optimize resection of the tumor.     With positioning, navigation planning, and prep accomplished, the Incision was carried do [address] [facility], [address] [facility] Jamie Walters, [address] [facility]

## 2021-03-12 ENCOUNTER — APPOINTMENT (OUTPATIENT)
Dept: MRI IMAGING | Facility: HOSPITAL | Age: 46
DRG: 027 | End: 2021-03-12
Attending: NEUROLOGICAL SURGERY
Payer: COMMERCIAL

## 2021-03-12 LAB
ALBUMIN SERPL-MCNC: 3.4 G/DL (ref 3.4–5)
ALBUMIN/GLOB SERPL: 1 {RATIO} (ref 1–2)
ALP LIVER SERPL-CCNC: 75 U/L
ALT SERPL-CCNC: 59 U/L
ANION GAP SERPL CALC-SCNC: 8 MMOL/L (ref 0–18)
APTT PPP: 25.3 SECONDS (ref 25.4–36.1)
AST SERPL-CCNC: 27 U/L (ref 15–37)
BASOPHILS # BLD AUTO: 0.03 X10(3) UL (ref 0–0.2)
BASOPHILS NFR BLD AUTO: 0.2 %
BILIRUB SERPL-MCNC: 0.6 MG/DL (ref 0.1–2)
BUN BLD-MCNC: 6 MG/DL (ref 7–18)
BUN/CREAT SERPL: 12 (ref 10–20)
CALCIUM BLD-MCNC: 8.3 MG/DL (ref 8.5–10.1)
CHLORIDE SERPL-SCNC: 106 MMOL/L (ref 98–112)
CO2 SERPL-SCNC: 25 MMOL/L (ref 21–32)
CREAT BLD-MCNC: 0.5 MG/DL
DEPRECATED RDW RBC AUTO: 49.4 FL (ref 35.1–46.3)
EOSINOPHIL # BLD AUTO: 0.03 X10(3) UL (ref 0–0.7)
EOSINOPHIL NFR BLD AUTO: 0.2 %
ERYTHROCYTE [DISTWIDTH] IN BLOOD BY AUTOMATED COUNT: 13.8 % (ref 11–15)
GLOBULIN PLAS-MCNC: 3.3 G/DL (ref 2.8–4.4)
GLUCOSE BLD-MCNC: 92 MG/DL (ref 70–99)
HAV IGM SER QL: 2 MG/DL (ref 1.6–2.6)
HCT VFR BLD AUTO: 35.4 %
HGB BLD-MCNC: 11.9 G/DL
IMM GRANULOCYTES # BLD AUTO: 0.05 X10(3) UL (ref 0–1)
IMM GRANULOCYTES NFR BLD: 0.3 %
LYMPHOCYTES # BLD AUTO: 2.2 X10(3) UL (ref 1–4)
LYMPHOCYTES NFR BLD AUTO: 15 %
M PROTEIN MFR SERPL ELPH: 6.7 G/DL (ref 6.4–8.2)
MCH RBC QN AUTO: 32.7 PG (ref 26–34)
MCHC RBC AUTO-ENTMCNC: 33.6 G/DL (ref 31–37)
MCV RBC AUTO: 97.3 FL
MONOCYTES # BLD AUTO: 2.01 X10(3) UL (ref 0.1–1)
MONOCYTES NFR BLD AUTO: 13.7 %
NEUTROPHILS # BLD AUTO: 10.33 X10 (3) UL (ref 1.5–7.7)
NEUTROPHILS # BLD AUTO: 10.33 X10(3) UL (ref 1.5–7.7)
NEUTROPHILS NFR BLD AUTO: 70.6 %
OSMOLALITY SERPL CALC.SUM OF ELEC: 285 MOSM/KG (ref 275–295)
PHOSPHATE SERPL-MCNC: 3.5 MG/DL (ref 2.5–4.9)
PLATELET # BLD AUTO: 200 10(3)UL (ref 150–450)
POTASSIUM SERPL-SCNC: 3.2 MMOL/L (ref 3.5–5.1)
RBC # BLD AUTO: 3.64 X10(6)UL
SODIUM SERPL-SCNC: 139 MMOL/L (ref 136–145)
WBC # BLD AUTO: 14.7 X10(3) UL (ref 4–11)

## 2021-03-12 PROCEDURE — 70553 MRI BRAIN STEM W/O & W/DYE: CPT | Performed by: NEUROLOGICAL SURGERY

## 2021-03-12 PROCEDURE — 99291 CRITICAL CARE FIRST HOUR: CPT | Performed by: INTERNAL MEDICINE

## 2021-03-12 RX ORDER — POTASSIUM CHLORIDE 20 MEQ/1
40 TABLET, EXTENDED RELEASE ORAL ONCE
Status: COMPLETED | OUTPATIENT
Start: 2021-03-12 | End: 2021-03-12

## 2021-03-12 NOTE — PLAN OF CARE
Pt. Alert and oriented, conversing appropriately. Medicated with norco for c/o incisional and right jaw pain. Hourly neuro exams as charted in epic. Right scalp incision with dermabond COLETTE. Tmax 100.9 this shift.

## 2021-03-12 NOTE — OCCUPATIONAL THERAPY NOTE
OCCUPATIONAL THERAPY QUICK EVALUATION - INPATIENT    Room Number: 8026/8681-R  Evaluation Date: 3/12/2021     Type of Evaluation: Quick Eval  Presenting Problem: R tumor resection 3/11    Physician Order: IP Consult to Occupational Therapy  Reason for Ther tired\"      OBJECTIVE  Precautions: Seizure; Other (Comment) (-150)       WEIGHT BEARING RESTRICTION                   PAIN ASSESSMENT  Ratin  Location: incision       COGNITION  Intact    RANGE OF MOTION AND STRENGTH ASSESSMENT  Upper extremity stand>sit>supine. Pt has no other concerns going home. Pt reports wife has RW upon discharge in case but does not feel it is needed. Patient End of Session: In bed; With [facility] staff;Needs met;Call light within reach; All patient questions and concerns addres

## 2021-03-12 NOTE — PROGRESS NOTES
[facility]  Neurosurgery Progress Note    Jenae Monterroso Patient Status:  Inpatient    1975 MRN [id number]   [facility] 6NE-A Attending Keith Ramirez MD   [facility] Day # 1 PCP Geovany Gonzalez DO     Chief Complaint:  R temporal tumor and examined with np  Case discussed  Just back from mri  Mri looks good  Inc c/d/i  F/c x 4  A,a,ox3  [id number] [address] for floor  Pt/ot  Spoke with [employer]

## 2021-03-12 NOTE — CM/SW NOTE
03/12/21 1400   CM/SW Screening   Referral Source    Information Source Chart review;[facility] staff   Patient's Mental Status Alert;Oriented   Patient lives with Spouse; Children   Patient Status Prior to Admission   Independent with ADLs and Mobi

## 2021-03-12 NOTE — PLAN OF CARE
Assumed care of patient at 0730. Patient alert X 4. Neuro exam intact. NSR on monitor. Sbp normotensive. Lungs clear/dim. Borderline febrile--Tmax 100.7. right temporal incision is C/D/I. PRN norco for pain with good relief. Voids. Up to chair.  Okay to go ADULT  Goal: Incision(s), wounds(s) or drain site(s) healing without S/S of infection  Description: INTERVENTIONS:  - Assess and document risk factors for pressure ulcer development  - Assess and document skin integrity  - Assess and document dressing/inci

## 2021-03-12 NOTE — PROGRESS NOTES
[facility]  Neurocritical Care Progress Note     Valentin Hickman Patient Status:  Inpatient    1975 MRN [id number]   [facility] 6NE-A Attending Denver Nazario MD   [facility] Day # 1 PCP Amy Jenkins DO     Subjective: PRN  diazepam (VALIUM) injection 2 mg, 2 mg, Intravenous, Q6H PRN      Physical Examination:   General- No acute distress  CV- RRR  Resp- CTA Bilat  Neuro-  · Mental status- awake and alert, regards and follows commands, oriented x3, speech fluent  · Crani

## 2021-03-12 NOTE — PROGRESS NOTES
[facility] Hospitalist Progress Note                                                                   [address] [facility]  5/7/1975    SUBJECTIVE: No chest pain, palpitations, shortness of breath, co care following  -PT/OT     Post Operative Pain  -hydromorphone iv prn  -norco po prn      Hypokalemia  -replace per protocol    Leukocytosis  -likely reactive to surgery  -no obvious infection  -no abx at this time  -trend/monitor clinically     Hx of Epil

## 2021-03-12 NOTE — PHYSICAL THERAPY NOTE
PHYSICAL THERAPY EVALUATION - INPATIENT     Room Number: 5054/5492-P  Evaluation Date: 3/12/2021  Type of Evaluation: Initial  Physician Order: PT Eval and Treat    Presenting Problem: S/p R craniotomy for tumor resection  Reason for Therapy: Roger Mcclure Head  Management Techniques:  Activity promotion;Repositioning    COGNITION  · Overall Cognitive Status:  WFL - within functional limits    RANGE OF MOTION AND STRENGTH ASSESSMENT  Upper extremity ROM and strength are within functional limits     Lower extr hyperextension at R knee w/ stance phase of gait)  Stoop/Curb Assistance: Modified independent  Comment : Up and down flight of stairs w/ step over step pattern, use of railing    Skilled Therapy Provided: Pt completed supine <> sit complete independence. evolving and overall evaluation complexity is considered moderate. PT Discharge Recommendations: Home    PLAN  Patient has been evaluated and presents with no skilled Physical Therapy needs at this time. Patient discharged from Physical Therapy services.

## 2021-03-13 VITALS
HEIGHT: 74 IN | TEMPERATURE: 99 F | HEART RATE: 82 BPM | OXYGEN SATURATION: 93 % | RESPIRATION RATE: 17 BRPM | DIASTOLIC BLOOD PRESSURE: 70 MMHG | SYSTOLIC BLOOD PRESSURE: 114 MMHG | WEIGHT: 216.5 LBS | BODY MASS INDEX: 27.78 KG/M2

## 2021-03-13 LAB
ANION GAP SERPL CALC-SCNC: 8 MMOL/L (ref 0–18)
BASOPHILS # BLD AUTO: 0.04 X10(3) UL (ref 0–0.2)
BASOPHILS NFR BLD AUTO: 0.3 %
BUN BLD-MCNC: 6 MG/DL (ref 7–18)
BUN/CREAT SERPL: 10 (ref 10–20)
CALCIUM BLD-MCNC: 9.1 MG/DL (ref 8.5–10.1)
CHLORIDE SERPL-SCNC: 102 MMOL/L (ref 98–112)
CO2 SERPL-SCNC: 27 MMOL/L (ref 21–32)
CREAT BLD-MCNC: 0.6 MG/DL
DEPRECATED RDW RBC AUTO: 48 FL (ref 35.1–46.3)
EOSINOPHIL # BLD AUTO: 0.04 X10(3) UL (ref 0–0.7)
EOSINOPHIL NFR BLD AUTO: 0.3 %
ERYTHROCYTE [DISTWIDTH] IN BLOOD BY AUTOMATED COUNT: 13.7 % (ref 11–15)
GLUCOSE BLD-MCNC: 99 MG/DL (ref 70–99)
HAV IGM SER QL: 2.2 MG/DL (ref 1.6–2.6)
HCT VFR BLD AUTO: 36.2 %
HGB BLD-MCNC: 12.7 G/DL
IMM GRANULOCYTES # BLD AUTO: 0.05 X10(3) UL (ref 0–1)
IMM GRANULOCYTES NFR BLD: 0.4 %
LYMPHOCYTES # BLD AUTO: 1.78 X10(3) UL (ref 1–4)
LYMPHOCYTES NFR BLD AUTO: 14.9 %
MCH RBC QN AUTO: 33.4 PG (ref 26–34)
MCHC RBC AUTO-ENTMCNC: 35.1 G/DL (ref 31–37)
MCV RBC AUTO: 95.3 FL
MONOCYTES # BLD AUTO: 1.77 X10(3) UL (ref 0.1–1)
MONOCYTES NFR BLD AUTO: 14.8 %
NEUTROPHILS # BLD AUTO: 8.3 X10 (3) UL (ref 1.5–7.7)
NEUTROPHILS # BLD AUTO: 8.3 X10(3) UL (ref 1.5–7.7)
NEUTROPHILS NFR BLD AUTO: 69.3 %
OSMOLALITY SERPL CALC.SUM OF ELEC: 282 MOSM/KG (ref 275–295)
PLATELET # BLD AUTO: 196 10(3)UL (ref 150–450)
POTASSIUM SERPL-SCNC: 3.2 MMOL/L (ref 3.5–5.1)
RBC # BLD AUTO: 3.8 X10(6)UL
SODIUM SERPL-SCNC: 137 MMOL/L (ref 136–145)
WBC # BLD AUTO: 12 X10(3) UL (ref 4–11)

## 2021-03-13 PROCEDURE — 99291 CRITICAL CARE FIRST HOUR: CPT | Performed by: INTERNAL MEDICINE

## 2021-03-13 RX ORDER — HYDROCODONE BITARTRATE AND ACETAMINOPHEN 5; 325 MG/1; MG/1
1-2 TABLET ORAL EVERY 6 HOURS PRN
Qty: 30 TABLET | Refills: 0 | Status: SHIPPED | OUTPATIENT
Start: 2021-03-13 | End: 2021-03-15

## 2021-03-13 RX ORDER — POTASSIUM CHLORIDE 20 MEQ/1
40 TABLET, EXTENDED RELEASE ORAL ONCE
Status: COMPLETED | OUTPATIENT
Start: 2021-03-13 | End: 2021-03-13

## 2021-03-13 NOTE — PLAN OF CARE
Assumed patient care this am. Patient alert, oriented x4. Neurologically intact. Pain adequately controlled. Patient given zofran this morning for nausea. Patient feels that he drank too much water too fast, nausea resolved.  Tolerated regular diet for guanakito

## 2021-03-13 NOTE — PLAN OF CARE
Received pt at 1930. A/O x4. Neuro checks intact. VSS. R crani incision, CDI. Pt c/o incisional pain. Norco administered with good relief. Pt continues to have fevers. Tmax 100.9. Voiding. Ambulating. Awaiting for tele bed.

## 2021-03-13 NOTE — PROGRESS NOTES
[facility] Hospitalist Progress Note                                                                   [address] [facility]  5/7/1975    SUBJECTIVE: No chest pain, palpitations, shortness of breath, co tumor resection  -neurosurgery following --> repeat Mri looks stable  -neuro critical care following --> no acute intervention   -PT/OT --> home     Post Operative Pain  -norco po prn      Hypokalemia  -replace per protocol    Leukocytosis--> improving  -l

## 2021-03-13 NOTE — PROGRESS NOTES
[facility]  Neurocritical Care Progress Note     Thermon Sovereign Patient Status:  Inpatient    1975 MRN [id number]   [facility] 6NE-A Attending Ari Simms MD   [facility] Day # 2 PCP Jeannie Radford DO     Subjective: mg, Intravenous, Q6H PRN      Physical Examination:   General- No acute distress  CV- RRR  Resp- CTA Bilat  Neuro-  · Mental status- awake and alert, regards and follows commands, oriented x3, speech fluent  · Cranial nerves- pupils equally round and react care time (exclusive of billable procedures) was administered to manage and/or prevent neurologic instability. This involved direct patient intervention, complex decision making, and/or extensive discussions with the patient, family, and clinical staff.

## 2021-03-13 NOTE — PROGRESS NOTES
[facility]  Neurosurgery Progress Note  3/13/2021    Shima Beauyesica Patient Status:  Inpatient    1975 MRN [id number]   [facility] 6NE-A Attending Ez Haro MD   [facility] Day # 2 PCP Chanelle Joshua DO     SUBJECTIVE:  Rosa Terrell 0.00 - 0.70 x10(3) uL    Basophil Absolute 0.04 0.00 - 0.20 x10(3) uL    Immature Granulocyte Absolute 0.05 0.00 - 1.00 x10(3) uL    Neutrophil % 69.3 %    Lymphocyte % 14.9 %    Monocyte % 14.8 %    Eosinophil % 0.3 %    Basophil % 0.3 %    Immature Granu  Symptoms

## 2021-03-14 ENCOUNTER — PATIENT MESSAGE (OUTPATIENT)
Dept: SURGERY | Facility: CLINIC | Age: 46
End: 2021-03-14

## 2021-03-14 LAB — BLOOD TYPE BARCODE: 9500

## 2021-03-15 ENCOUNTER — TELEPHONE (OUTPATIENT)
Dept: SURGERY | Facility: CLINIC | Age: 46
End: 2021-03-15

## 2021-03-15 RX ORDER — HYDROCODONE BITARTRATE AND ACETAMINOPHEN 5; 325 MG/1; MG/1
1-2 TABLET ORAL EVERY 6 HOURS PRN
Qty: 30 TABLET | Refills: 0 | Status: SHIPPED | OUTPATIENT
Start: 2021-03-15 | End: 2021-03-26 | Stop reason: ALTCHOICE

## 2021-03-15 NOTE — DISCHARGE SUMMARY
Eden Valenzuela 89 Patient Status:  Inpatient    1975 MRN [id number]   [facility] 6NE-A Attending No att. providers found   Hosp Day # 2 PCP Dwight Ferrera DO     Date of Admission: 3/11/2021  Date o Elana, [phone], [phone]  [address] [facility] [address]    Phone: [phone]   · HYDROcodone-acetaminophen 5-325 MG Tabs       Follow-up appointment:   BARBI Carlson  [address] [place] [address] Dr Bartholomew [place] [phone] minutes

## 2021-03-15 NOTE — TELEPHONE ENCOUNTER
Medication: Hydrocodone    Date of last refill: 3/13/21  [place]      Last office visit: sx 3/11/21  Due back to clinic per last office note:     Date next office visit scheduled:  3/26/21    Last URINE Screen:    Screen

## 2021-03-15 NOTE — TELEPHONE ENCOUNTER
Pt requesting Hydrocodone refill. Verified pharmacy. Patient informed of 48 hour refill policy excluding weekends and holidays. Informed patient only patient can  the prescription effective April 1, 2017.   Further explained patient will not rece

## 2021-03-15 NOTE — TELEPHONE ENCOUNTER
From: [facility]  To: Lizzy Mark MD  Sent: 3/14/2021 3:13 PM CDT  Subject: Non-Urgent Medical Question    I received a message to get a new mri again for Dr Beck Koroma. Let me know f that needs to be separate from the 2 we did since Thursday.  Thank you

## 2021-03-16 NOTE — TELEPHONE ENCOUNTER
S:  Patient request noted regarding refusal of [place] refill. B:  Patient underwent surgery on 3/11/21 with Dr. Kimberly Cadena: Right craniotomy for tumor resection.     A:  Medication order confirmation received:    HYDROcodone-acetaminophen 5-325 MG Oral Tab

## 2021-03-16 NOTE — PAYOR COMM NOTE
--------------  DISCHARGE REVIEW    Payor: Maximilian Jama  #:  [id number]  Authorization Number: [id number]    Admit date: 3/11/21  Admit time:   5:43 AM  Discharge Date: 3/13/2021 10:44 AM     Admitting Physician: Warden Tl MD  Atte details   acetaminophen 500 MG Tabs  Commonly known as: TYLENOL EXTRA STRENGTH      Take 1,000 mg by mouth every 6 (six) hours as needed for Pain.    Refills: 0     Fluticasone Propionate 50 MCG/ACT Susp  Commonly known as: FLONASE      2 sprays by Nasal ro craniotomy for tumor resection      Brain tumor  -POD#2 s/p right craniotomy for tumor resection  -neurosurgery following --> repeat Mri looks stable--> ok for discahrge  -neuro critical care following --> no acute intervention   -PT/OT --> home     Post O

## 2021-03-16 NOTE — TELEPHONE ENCOUNTER
Pt states ins company denied [place] refill, he won't be able to fill in till 3/19/21. Pt wondering what he will do in the meantime for medication till he is able to get refill. Pls advise.

## 2021-03-22 ENCOUNTER — OFFICE VISIT (OUTPATIENT)
Dept: NEUROLOGY | Facility: CLINIC | Age: 46
End: 2021-03-22

## 2021-03-22 ENCOUNTER — TELEPHONE (OUTPATIENT)
Dept: SURGERY | Facility: CLINIC | Age: 46
End: 2021-03-22

## 2021-03-22 VITALS — HEART RATE: 76 BPM | SYSTOLIC BLOOD PRESSURE: 118 MMHG | DIASTOLIC BLOOD PRESSURE: 84 MMHG

## 2021-03-22 DIAGNOSIS — Z98.890 S/P CRANIOTOMY: ICD-10-CM

## 2021-03-22 DIAGNOSIS — G93.89 RIGHT TEMPORAL LOBE MASS: Primary | ICD-10-CM

## 2021-03-22 PROCEDURE — 99024 POSTOP FOLLOW-UP VISIT: CPT | Performed by: PHYSICIAN ASSISTANT

## 2021-03-22 PROCEDURE — 3074F SYST BP LT 130 MM HG: CPT | Performed by: PHYSICIAN ASSISTANT

## 2021-03-22 PROCEDURE — 3079F DIAST BP 80-89 MM HG: CPT | Performed by: PHYSICIAN ASSISTANT

## 2021-03-22 NOTE — TELEPHONE ENCOUNTER
----- Message from Maricarmen Espinosa MD sent at 3/22/2021  1:40 PM CDT -----  Discussed with patient.  Will keep regular follow-up and plan MRI in 3 months

## 2021-03-22 NOTE — PROGRESS NOTES
Patient here post op:    Right craniotomy for tumor resection Right General   NAVIGATION        Patient states he is having issues with hearing out of left ear. Patient states he is managing post op pain with 1 Norco per day.

## 2021-03-22 NOTE — PROGRESS NOTES
Neurosurgery Clinic Visit  3/22/2021    Devaughnva Ajit PCP:  Clinton Samuel DO    1975 MRN [id number]       CC:  S/P R Craniotomy for tumor resection 3/11/21 Dr. Twin Steinberg    HPI:    Alis is here for 2 week post op appt. He is doing well.   No seizure was reviewed. Pertinent positives and negatives are noted in HPI.       PHYSICAL EXAMINATION:   03/22/21  1145   BP: 118/84   Pulse: 76     GENERAL:  Patient is a 39year old male in no acute distress.   NEUROLOGICAL:                  Cognition: alert and

## 2021-03-30 ENCOUNTER — OFFICE VISIT (OUTPATIENT)
Dept: SURGERY | Facility: CLINIC | Age: 46
End: 2021-03-30
Payer: COMMERCIAL

## 2021-03-30 VITALS
SYSTOLIC BLOOD PRESSURE: 112 MMHG | DIASTOLIC BLOOD PRESSURE: 78 MMHG | WEIGHT: 190 LBS | BODY MASS INDEX: 24.38 KG/M2 | HEIGHT: 74 IN | HEART RATE: 68 BPM

## 2021-03-30 DIAGNOSIS — D36.9: Primary | ICD-10-CM

## 2021-03-30 PROCEDURE — 3078F DIAST BP <80 MM HG: CPT | Performed by: PHYSICIAN ASSISTANT

## 2021-03-30 PROCEDURE — 3008F BODY MASS INDEX DOCD: CPT | Performed by: PHYSICIAN ASSISTANT

## 2021-03-30 PROCEDURE — 3074F SYST BP LT 130 MM HG: CPT | Performed by: PHYSICIAN ASSISTANT

## 2021-03-30 PROCEDURE — 99024 POSTOP FOLLOW-UP VISIT: CPT | Performed by: PHYSICIAN ASSISTANT

## 2021-03-30 NOTE — PROGRESS NOTES
Neurosurgery Clinic Visit  3/30/2021    Rimma Lerma PCP:  Shannan Seymour DO    1975 MRN [id number]     CC:  S/P R Craniotomy for tumor resection 3/11/21 Dr. Darwin Mak    HPI:    Alis is here for a wound check.   He has had dots of clear yellow fluid current alcohol use.  He reports that he does not use drugs.     ALLERGIES:     Tegretol [Carbamaze*    OTHER (SEE COMMENTS)    Comment:Burn type inflammation on skin.     MEDICATIONS:     Prescriptions Prior to Admission   (Not in a hospital admission)     small Band-Aid. Rx keflex has been sent to pharmacy. F/u in 1-2 weeks for wound check. Will discuss with Dr. Claudette Mendez.     Kolton Aguilar PA-C  [facility]  3/30/2021  2:18 PM

## 2021-03-30 NOTE — PROGRESS NOTES
Patient here for postop follow-up sx 03/11/21. Fluid in eardrums have improved after flonase and sitting up. Incision site in front of right ear occasionally breaks open when stretching face. States does not look like blood, but yellowish fluid.

## 2021-04-01 NOTE — TELEPHONE ENCOUNTER
Patient underwent surgery on 3/11/21 with Dr. Maeve Begum. 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   S/P R Craniotomy for tumor resecti

## 2021-05-03 ENCOUNTER — OFFICE VISIT (OUTPATIENT)
Dept: NEUROLOGY | Facility: CLINIC | Age: 46
End: 2021-05-03
Payer: COMMERCIAL

## 2021-05-03 VITALS
BODY MASS INDEX: 23.49 KG/M2 | SYSTOLIC BLOOD PRESSURE: 100 MMHG | DIASTOLIC BLOOD PRESSURE: 70 MMHG | HEIGHT: 74 IN | WEIGHT: 183 LBS

## 2021-05-03 DIAGNOSIS — D48.9 GANGLIOGLIOMA: Primary | ICD-10-CM

## 2021-05-03 DIAGNOSIS — R20.0 LEG NUMBNESS: ICD-10-CM

## 2021-05-03 DIAGNOSIS — Z98.890 S/P CRANIOTOMY: ICD-10-CM

## 2021-05-03 PROCEDURE — 3078F DIAST BP <80 MM HG: CPT | Performed by: NEUROLOGICAL SURGERY

## 2021-05-03 PROCEDURE — 99024 POSTOP FOLLOW-UP VISIT: CPT | Performed by: NEUROLOGICAL SURGERY

## 2021-05-03 PROCEDURE — 3008F BODY MASS INDEX DOCD: CPT | Performed by: NEUROLOGICAL SURGERY

## 2021-05-03 PROCEDURE — 3074F SYST BP LT 130 MM HG: CPT | Performed by: NEUROLOGICAL SURGERY

## 2021-05-03 NOTE — PROGRESS NOTES
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   Patient is doing well, notes incision has PHYSICAL EXAMINATION:  VITAL SIGNS: /70 (BP Location: Right arm, Patient Position: Sitting)   Ht 74\"   Wt 183 lb (83 kg)   BMI 23.50 kg/m²   GENERAL:  Patient is a 39year old male in no acute distress.   NEUROLOGICAL:     Cognition: alert and or

## 2021-05-03 NOTE — PROGRESS NOTES
Pt here for 8wk post op f/u craniotomy for tumor resection on 3/11/21    No symptoms post sx     Notes tingling in legs and states he has been dealing with back issues for 20 years     No seizures post sx and pt states he feels great

## 2021-05-07 ENCOUNTER — ORDER TRANSCRIPTION (OUTPATIENT)
Dept: ADMINISTRATIVE | Facility: HOSPITAL | Age: 46
End: 2021-05-07

## 2021-05-07 DIAGNOSIS — R20.0 NUMBNESS OF LEGS: Primary | ICD-10-CM

## 2021-05-13 ENCOUNTER — PROCEDURE VISIT (OUTPATIENT)
Dept: NEUROLOGY | Facility: CLINIC | Age: 46
End: 2021-05-13
Payer: COMMERCIAL

## 2021-05-13 DIAGNOSIS — M51.37 DEGENERATION OF LUMBAR OR LUMBOSACRAL INTERVERTEBRAL DISC: ICD-10-CM

## 2021-05-13 PROCEDURE — 95909 NRV CNDJ TST 5-6 STUDIES: CPT | Performed by: OTHER

## 2021-05-13 PROCEDURE — 95886 MUSC TEST DONE W/N TEST COMP: CPT | Performed by: OTHER

## 2021-05-13 NOTE — PROCEDURES
Date of service: 5/13/2021    Procedure: Nerve Conduction Study and Electromyography - complete EMG study in bilateral lower extremities. History: Electrodiagnostic testing on this 55year old male was performed in bilateral lower extremities.  The patie (quadriceps), tibialis anterior, peroneus longus, medial gastrocnemius, extensor hallucis longus reveals normal insertional activity, normal spontaneous activity.  Motor unit potentials (MUPs) are of normal amplitude and duration with a full recruitment pat

## 2021-05-18 ENCOUNTER — TELEPHONE (OUTPATIENT)
Dept: SURGERY | Facility: CLINIC | Age: 46
End: 2021-05-18

## 2021-05-18 NOTE — TELEPHONE ENCOUNTER
MRI Brain (61179) denial reason:    MRI is supported once per three months during the first two years after the tumor was found, then once per six months during the next three years, then once a year after that. Record show last MRI 3/12/21. Patient is scheduled at [facility] on 5/25/21    Peer to Peer can be scheduled by calling [phone]    Case #[id number]    Written appeal also can be done to:    [facility]  Attn: Appeals  [facility].  [address] [facility], [address]  Fax #[phone]

## 2021-05-19 NOTE — TELEPHONE ENCOUNTER
Called pt to inform.  Pt accepting and request that I send information via [address]     Setting pt reminder for August 2021 for Rn to reach out to pt to schedule MRI     Imaging can be completed no sooner than 9/10/21 per insurance protocol stated below

## 2021-05-24 ENCOUNTER — TELEPHONE (OUTPATIENT)
Dept: SURGERY | Facility: CLINIC | Age: 46
End: 2021-05-24

## 2021-05-24 ENCOUNTER — OFFICE VISIT (OUTPATIENT)
Dept: NEUROLOGY | Facility: CLINIC | Age: 46
End: 2021-05-24
Payer: COMMERCIAL

## 2021-05-24 VITALS — HEART RATE: 76 BPM | SYSTOLIC BLOOD PRESSURE: 118 MMHG | DIASTOLIC BLOOD PRESSURE: 78 MMHG

## 2021-05-24 DIAGNOSIS — R20.0 BILATERAL LEG NUMBNESS: Primary | ICD-10-CM

## 2021-05-24 PROCEDURE — 3074F SYST BP LT 130 MM HG: CPT | Performed by: NEUROLOGICAL SURGERY

## 2021-05-24 PROCEDURE — 99024 POSTOP FOLLOW-UP VISIT: CPT | Performed by: NEUROLOGICAL SURGERY

## 2021-05-24 PROCEDURE — 3078F DIAST BP <80 MM HG: CPT | Performed by: NEUROLOGICAL SURGERY

## 2021-05-24 RX ORDER — GABAPENTIN 100 MG/1
100 CAPSULE ORAL 3 TIMES DAILY
Qty: 90 CAPSULE | Refills: 0 | Status: SHIPPED | OUTPATIENT
Start: 2021-05-24 | End: 2021-07-12 | Stop reason: ALTCHOICE

## 2021-05-24 NOTE — TELEPHONE ENCOUNTER
Pt saw Dr. Sydnie Rangel today for OV and was given script for Gabapentin. Pt getting EEG done second week of June. Pt states pharmacist suggested holding off taking gabapentin until after EEG  because it could \"hide seizure activity\".  Please call pt to advise

## 2021-05-24 NOTE — PROGRESS NOTES
Patient here for a follow up and to discuss his current issues, post EMG on 5/13/21. Lopez Claude Patient reports N/T in feet. Patient has a history of steroid injections in his back, craniotomy.

## 2021-05-24 NOTE — PROGRESS NOTES
[facility]  Neurosurgery Clinic Visit      HISTORY OF PRESENT ILLNESS:  Thad Orosco is a(n) 55year old male here for follow-up after EMG.   He continues to note painful paresthesias in his BLE, starting at the knee and extending circ is symmetrical and sensation is intact. Tongue is midline.     Motor: 5/5 BLE   Sensation: intact to light touch bilaterally     Reflexes: deferred   Coordination: deferred   Gait: normal       DIAGNOSTIC DATA:      IMAGING:  No new imaging    EMG BLE unrem

## 2021-05-25 NOTE — TELEPHONE ENCOUNTER
Addressed by Dr Latesha Fields pt's neurologist and has been instructed to begin medication before EEG

## 2021-05-27 ENCOUNTER — TELEPHONE (OUTPATIENT)
Dept: SURGERY | Facility: CLINIC | Age: 46
End: 2021-05-27

## 2021-05-27 NOTE — TELEPHONE ENCOUNTER
Per [facility] is approved to be done at [address] [facility].   Order faxed at [phone]

## 2021-06-01 ENCOUNTER — TELEPHONE (OUTPATIENT)
Dept: SURGERY | Facility: CLINIC | Age: 46
End: 2021-06-01

## 2021-06-01 NOTE — TELEPHONE ENCOUNTER
Pt dropped off disc of MRI-spine from [facility] dated 6.24.19. Uploaded to [facility].   Disc mailed to pt's home address

## 2021-06-01 NOTE — TELEPHONE ENCOUNTER
Received MRI Lumbar SP w/o contrast from [facility].   Endorsed to Provider for review

## 2021-06-02 ENCOUNTER — LAB ENCOUNTER (OUTPATIENT)
Dept: LAB | Age: 46
End: 2021-06-02
Attending: FAMILY MEDICINE
Payer: COMMERCIAL

## 2021-06-02 ENCOUNTER — OFFICE VISIT (OUTPATIENT)
Dept: FAMILY MEDICINE CLINIC | Facility: CLINIC | Age: 46
End: 2021-06-02
Payer: COMMERCIAL

## 2021-06-02 VITALS
DIASTOLIC BLOOD PRESSURE: 80 MMHG | RESPIRATION RATE: 16 BRPM | BODY MASS INDEX: 24 KG/M2 | HEIGHT: 74 IN | OXYGEN SATURATION: 99 % | WEIGHT: 187 LBS | TEMPERATURE: 98 F | SYSTOLIC BLOOD PRESSURE: 122 MMHG | HEART RATE: 78 BPM

## 2021-06-02 DIAGNOSIS — G62.9 NEUROPATHY: ICD-10-CM

## 2021-06-02 DIAGNOSIS — Z13.228 SCREENING FOR ENDOCRINE, METABOLIC AND IMMUNITY DISORDER: ICD-10-CM

## 2021-06-02 DIAGNOSIS — Z13.0 SCREENING FOR ENDOCRINE, METABOLIC AND IMMUNITY DISORDER: ICD-10-CM

## 2021-06-02 DIAGNOSIS — L98.9 SKIN LESION OF RIGHT LOWER EXTREMITY: ICD-10-CM

## 2021-06-02 DIAGNOSIS — G40.909 SEIZURE DISORDER (HCC): Primary | ICD-10-CM

## 2021-06-02 DIAGNOSIS — Z13.29 SCREENING FOR ENDOCRINE, METABOLIC AND IMMUNITY DISORDER: ICD-10-CM

## 2021-06-02 PROBLEM — T42.0X1A ACCIDENTAL PHENYTOIN POISONING: Status: ACTIVE | Noted: 2021-06-02

## 2021-06-02 PROCEDURE — 36415 COLL VENOUS BLD VENIPUNCTURE: CPT

## 2021-06-02 PROCEDURE — 82607 VITAMIN B-12: CPT

## 2021-06-02 PROCEDURE — 85025 COMPLETE CBC W/AUTO DIFF WBC: CPT

## 2021-06-02 PROCEDURE — 3008F BODY MASS INDEX DOCD: CPT | Performed by: FAMILY MEDICINE

## 2021-06-02 PROCEDURE — 99203 OFFICE O/P NEW LOW 30 MIN: CPT | Performed by: FAMILY MEDICINE

## 2021-06-02 PROCEDURE — 84443 ASSAY THYROID STIM HORMONE: CPT

## 2021-06-02 PROCEDURE — 80053 COMPREHEN METABOLIC PANEL: CPT

## 2021-06-02 PROCEDURE — 3079F DIAST BP 80-89 MM HG: CPT | Performed by: FAMILY MEDICINE

## 2021-06-02 PROCEDURE — 84439 ASSAY OF FREE THYROXINE: CPT

## 2021-06-02 PROCEDURE — 3074F SYST BP LT 130 MM HG: CPT | Performed by: FAMILY MEDICINE

## 2021-06-02 PROCEDURE — 80061 LIPID PANEL: CPT

## 2021-06-02 RX ORDER — VITAMIN B COMPLEX
1 TABLET ORAL DAILY
COMMUNITY
End: 2021-08-09

## 2021-06-02 NOTE — TELEPHONE ENCOUNTER
Received MRI Lumbar SP w/o contrast from [facility] and placed on Dr. Jay Speaker desk for review

## 2021-06-02 NOTE — H&P
[facility], [address] [place] [address]    History and Physical    [facility] Patient Status:  No patient class for patient encounter    1975 MRN [id number]   Location [address] [facility], [address] [facility] , [address] [facility] Attending No att.  pro a . I had reviewed past medical and family histories together with allergy and medication lists documented.         History     Past Medical History:   Diagnosis Date   • COVID-19    • Dilantin toxicity    • [facility]   • cerumen present  Left Ear: Tympanic membrane and ear canal normal. No cerumen present  Eyes: Pupils are equal, round, and reactive to light. Conjunctivae are normal. No scleral icterus. Neck: Neck supple.    Cardiovascular: Normal rate, regular rhythm and Kesha Gaytan MD  6/2/2021

## 2021-06-02 NOTE — PATIENT INSTRUCTIONS
Thank you for choosing Luis Patel MD at [place] [address]  To Do: [address] [facility]  1. Please take meds as directed. [facility] is located in [address]. Monday, Tuesday & Friday – 8 a.m. to 4 p.m. Wednesday, Thursday – 7 a.m. to 3 p.m. those potential risks and we strive to make you healthier and to improve your quality of life.     Referrals, and Radiology Information:    If your insurance requires a referral to a specialist, please allow 5 business days to process your referral request.

## 2021-06-04 NOTE — TELEPHONE ENCOUNTER
Messaged pt via [facility] to inform that provider is in OR today and will not be able to relay imaging results until possibly early next week. Assure pt that provider will relay results as soon as he can .

## 2021-06-09 NOTE — TELEPHONE ENCOUNTER
Pt calling to find out Dr Wheeler Brittle comments re: MRI results.   Pt states someone can call or send [employer] message

## 2021-06-11 ENCOUNTER — PATIENT MESSAGE (OUTPATIENT)
Dept: NEUROLOGY | Facility: CLINIC | Age: 46
End: 2021-06-11

## 2021-06-11 NOTE — TELEPHONE ENCOUNTER
Received MRI Lumbar SP w/o contrast from [facility].  Endorsed to Provider for review    Did you have a chance to review?   Please advise, thank you

## 2021-06-11 NOTE — TELEPHONE ENCOUNTER
From: [facility]  To: Alyssa Malik MD  Sent: 6/11/2021 10:48 AM CDT  Subject: Test Results Question    MRI results look okay? I was told I'd hear something beginning of week. Hope you are doing well and looking forward to hearing from you. Thanks.  Mayank Sloan

## 2021-06-14 NOTE — TELEPHONE ENCOUNTER
Pt replied to provider     \"Thanks. Sounds the same as MRI in 2017. Any other boxes to check? \"     Routed to provider

## 2021-06-21 NOTE — TELEPHONE ENCOUNTER
Patient sent attachments of his EEG results via [employer]. Looking for explanation of results. Routed to providers.

## 2021-06-21 NOTE — TELEPHONE ENCOUNTER
Received initialed and reviewed report from BARBI Dave and endorsed to  staff with printed scanning sheet

## 2021-06-24 NOTE — TELEPHONE ENCOUNTER
Pt calling for clarification on MRI results he received from [address]. Please call pt to discuss.

## 2021-07-12 ENCOUNTER — OFFICE VISIT (OUTPATIENT)
Dept: FAMILY MEDICINE CLINIC | Facility: CLINIC | Age: 46
End: 2021-07-12
Payer: COMMERCIAL

## 2021-07-12 VITALS
BODY MASS INDEX: 22.97 KG/M2 | HEART RATE: 64 BPM | TEMPERATURE: 97 F | OXYGEN SATURATION: 98 % | SYSTOLIC BLOOD PRESSURE: 106 MMHG | RESPIRATION RATE: 16 BRPM | DIASTOLIC BLOOD PRESSURE: 70 MMHG | WEIGHT: 179 LBS | HEIGHT: 74 IN

## 2021-07-12 DIAGNOSIS — M72.2 PLANTAR FASCIITIS, BILATERAL: ICD-10-CM

## 2021-07-12 DIAGNOSIS — N28.1 KIDNEY CYSTS: Primary | ICD-10-CM

## 2021-07-12 DIAGNOSIS — G62.9 NEUROPATHY: ICD-10-CM

## 2021-07-12 DIAGNOSIS — G40.909 SEIZURE DISORDER (HCC): ICD-10-CM

## 2021-07-12 PROCEDURE — 99214 OFFICE O/P EST MOD 30 MIN: CPT | Performed by: FAMILY MEDICINE

## 2021-07-12 PROCEDURE — 3074F SYST BP LT 130 MM HG: CPT | Performed by: FAMILY MEDICINE

## 2021-07-12 PROCEDURE — 3078F DIAST BP <80 MM HG: CPT | Performed by: FAMILY MEDICINE

## 2021-07-12 PROCEDURE — 3008F BODY MASS INDEX DOCD: CPT | Performed by: FAMILY MEDICINE

## 2021-07-12 NOTE — PROGRESS NOTES
HPI/Subjective:   Patient ID: Kali Boyle is a 55year old male.     HPI  Mr. Ileana Marinelli is a pleasant 56 y/o M with PMHx of R temporal lobe mass (ganglionoma) s/p craniotomy on 3/11/21, seizures/epilepsy when he was 15 y/o, Lumbar DJD  here for his follow- numbness. Negative for dizziness, weakness, light-headedness and headaches. Current Outpatient Medications   Medication Sig Dispense Refill   • atorvastatin 20 MG Oral Tab Take 1 tablet (20 mg total) by mouth nightly.  90 tablet 1   • B Complex Vitamins ( massaging this with a cold bowel and see if this would improve. Rest from walking for now and perhaps do stationary biking and see if this would help. Follow-up as scheduled or as needed  No orders of the defined types were placed in this encounter.

## 2021-07-12 NOTE — PATIENT INSTRUCTIONS
Thank you for choosing Jovani Gudino MD at [place] [address]  [facility]  1. .please see info  THE [facility] is located in [address]. Monday, Tuesday & Friday – 8 a.m. to 4 p.m. Wednesday, Thursday – 7 a.m. to 3 p.m.   The lab is cl risks and we strive to make you healthier and to improve your quality of life.     Referrals, and Radiology Information:    If your insurance requires a referral to a specialist, please allow 5 business days to process your referral request.    If Chen Murrell found in people who stand on hard surfaces for long periods of time. Foot pain from this condition is usually worse in the morning. But it often improves with walking. By the end of the day there may be a dull aching.  Treatment requires short-term rest an inserts, a night splint, or a special boot may be required. If X-rays were taken, you will be told of any new findings that may affect your care.   When to seek medical advice  Call your healthcare provider right away if any of these occur:  · Foot swellin

## 2021-07-13 ENCOUNTER — TELEPHONE (OUTPATIENT)
Dept: NEUROLOGY | Facility: CLINIC | Age: 46
End: 2021-07-13

## 2021-07-13 NOTE — TELEPHONE ENCOUNTER
LMTCB to isabella peterson w/Dr. Sandoval on 9/20/21 in [place].   Need to jojo pt with Kaity Newman in [place]

## 2021-07-29 ENCOUNTER — HOSPITAL ENCOUNTER (OUTPATIENT)
Dept: ULTRASOUND IMAGING | Age: 46
Discharge: HOME | End: 2021-07-29
Attending: FAMILY MEDICINE
Payer: COMMERCIAL

## 2021-07-29 DIAGNOSIS — N28.1 KIDNEY CYSTS: ICD-10-CM

## 2021-07-29 PROCEDURE — 76770 US EXAM ABDO BACK WALL COMP: CPT | Performed by: FAMILY MEDICINE

## 2021-08-05 ENCOUNTER — OFFICE VISIT (OUTPATIENT)
Dept: NEUROLOGY | Facility: CLINIC | Age: 46
End: 2021-08-05
Payer: COMMERCIAL

## 2021-08-05 VITALS — RESPIRATION RATE: 16 BRPM | DIASTOLIC BLOOD PRESSURE: 70 MMHG | HEART RATE: 74 BPM | SYSTOLIC BLOOD PRESSURE: 104 MMHG

## 2021-08-05 DIAGNOSIS — R20.2 PARESTHESIA: ICD-10-CM

## 2021-08-05 DIAGNOSIS — L98.9 SKIN LESION: Primary | ICD-10-CM

## 2021-08-05 PROCEDURE — 3074F SYST BP LT 130 MM HG: CPT | Performed by: OTHER

## 2021-08-05 PROCEDURE — 3078F DIAST BP <80 MM HG: CPT | Performed by: OTHER

## 2021-08-05 PROCEDURE — 99214 OFFICE O/P EST MOD 30 MIN: CPT | Performed by: OTHER

## 2021-08-05 RX ORDER — GABAPENTIN 300 MG/1
300 CAPSULE ORAL 2 TIMES DAILY
Qty: 60 CAPSULE | Refills: 1 | Status: SHIPPED | OUTPATIENT
Start: 2021-08-05

## 2021-08-05 NOTE — PROGRESS NOTES
[facility]    Date of consult: 8/5/2021    CC/Reason for consult: seizure, paresthesia in feet  Consult Requested by Jaky Felix MD    HPI: Toya Salcedo is a 55year old male with past medical history as listed below p Tobacco Use      Smoking status: Former Smoker        Packs/day: 0.50        Years: 5.00        Pack years: 2.5        Types: Cigarettes      Smokeless tobacco: Never Used      Tobacco comment: 2017 approx    Alcohol use: Not Currently      Comment: None f lesion  See orders and medications filed with this encounter. The patient indicates understanding of these issues and agrees with the plan. Discussed with patient in detail regarding the adverse and side effects of the medications.   RTC 2 months, MRI brai

## 2021-08-09 ENCOUNTER — OFFICE VISIT (OUTPATIENT)
Dept: FAMILY MEDICINE CLINIC | Facility: CLINIC | Age: 46
End: 2021-08-09
Payer: COMMERCIAL

## 2021-08-09 VITALS
WEIGHT: 180 LBS | HEIGHT: 74 IN | BODY MASS INDEX: 23.1 KG/M2 | SYSTOLIC BLOOD PRESSURE: 100 MMHG | DIASTOLIC BLOOD PRESSURE: 60 MMHG | OXYGEN SATURATION: 99 % | TEMPERATURE: 98 F | RESPIRATION RATE: 16 BRPM | HEART RATE: 60 BPM

## 2021-08-09 DIAGNOSIS — Z00.00 WELLNESS EXAMINATION: Primary | ICD-10-CM

## 2021-08-09 PROCEDURE — 3078F DIAST BP <80 MM HG: CPT | Performed by: FAMILY MEDICINE

## 2021-08-09 PROCEDURE — 99396 PREV VISIT EST AGE 40-64: CPT | Performed by: FAMILY MEDICINE

## 2021-08-09 PROCEDURE — 3008F BODY MASS INDEX DOCD: CPT | Performed by: FAMILY MEDICINE

## 2021-08-09 PROCEDURE — 3074F SYST BP LT 130 MM HG: CPT | Performed by: FAMILY MEDICINE

## 2021-08-09 NOTE — PATIENT INSTRUCTIONS
Thank you for choosing Madison Gilmore MD at [place] [address]  To Do: Chavo [address]  1. Please see age appropriate health prevention below    [facility] is located in [address]. Monday, Tuesday & Friday – 8 a.m. to 4 p.m.   Wednesday, T that the benefits outweigh those potential risks and we strive to make you healthier and to improve your quality of life.     Referrals, and Radiology Information:    If your insurance requires a referral to a specialist, please allow 5 business days to pro exams   Blood pressure All men in this age group Yearly checkup if your blood pressure reading is normal  Normal blood pressure is less than 120/80 mm Hg  If your blood pressure is higher than normal, follow the advice of your healthcare provider      Depr dose should be given at least 2 months after the second dose and at least 4 months after the first dose   Haemophilus influenzae Type B (HIB) Men at increased risk for infection – talk with your healthcare provider 1 to 3 doses   Influenza (flu) All men in

## 2021-08-09 NOTE — PROGRESS NOTES
Wellness Exam    REASON FOR VISIT:    Nasra Egan is a 55year old male who presents for an [address]     Current Complaints: Mr. Kiki Garcia is here for his wellness exam  Flu Shot: see immunization record  Health Maintenance Topics wit Have you ever felt bad or Guilty about your drinking?: No    Eye Opener: Have you ever had a drink first thing in the morning to steady your nerves or to get rid of a hangover (Eye opener)?: No    Scoring  Total Score: 0     PHQ-4: Over the LAST 2 WEEKS If high risk No components found for: PPDINDURAT      ALLERGIES:     Tegretol [Carbamaze*    OTHER (SEE COMMENTS)    Comment:Burn type inflammation on skin.     CURRENT MEDICATIONS:   Current Outpatient Medications   Medication Sig Dispense Refill   • gabap gait problem. Skin: Negative for color change and rash. Neurological: Negative for tremors, weakness; + numbness  Hematological: Negative for adenopathy. Does not bruise/bleed easily. Psychiatric/Behavioral: Negative for confusion and agitation.  The examination      Continue with current exercise regimen. He will like to restrict fatty foods in his diet. He loves cheese and will restrict this. We shall recheck lipid panel and CMP in the next few months.     This note was prepared using [address] [employer] Chronic cerebral venous sinus thrombosis     Postoperative state     Right temporal lobe mass     Accidental phenytoin poisoning     Neuropathy    PREVENTIVE VISIT,EST,40-64

## 2021-08-13 ENCOUNTER — LAB ENCOUNTER (OUTPATIENT)
Dept: LAB | Age: 46
End: 2021-08-13
Attending: RADIOLOGY
Payer: COMMERCIAL

## 2021-08-13 ENCOUNTER — TELEPHONE (OUTPATIENT)
Dept: FAMILY MEDICINE CLINIC | Facility: CLINIC | Age: 46
End: 2021-08-13

## 2021-08-13 ENCOUNTER — PATIENT MESSAGE (OUTPATIENT)
Dept: NEUROLOGY | Facility: CLINIC | Age: 46
End: 2021-08-13

## 2021-08-13 DIAGNOSIS — R20.2 PARESTHESIA: ICD-10-CM

## 2021-08-13 LAB
FOLATE SERPL-MCNC: 23.3 NG/ML (ref 8.7–?)
VIT D+METAB SERPL-MCNC: 39.1 NG/ML (ref 30–100)

## 2021-08-13 PROCEDURE — 36415 COLL VENOUS BLD VENIPUNCTURE: CPT

## 2021-08-13 PROCEDURE — 84207 ASSAY OF VITAMIN B-6: CPT

## 2021-08-13 PROCEDURE — 82746 ASSAY OF FOLIC ACID SERUM: CPT

## 2021-08-13 PROCEDURE — 82306 VITAMIN D 25 HYDROXY: CPT

## 2021-08-13 PROCEDURE — 84425 ASSAY OF VITAMIN B-1: CPT

## 2021-08-13 NOTE — TELEPHONE ENCOUNTER
Patient states form for physical for work is incomplete, placed in [employer] folder,. please advise when ready for .

## 2021-08-13 NOTE — TELEPHONE ENCOUNTER
From: Berry Torres  To: Dinah Corrales MD  Sent: 8/13/2021 10:16 AM CDT  Subject: Test Results Question    Blood work completed this morning. Look forward to hearing from you.  Alis

## 2021-08-16 NOTE — TELEPHONE ENCOUNTER
Pt calling to check status on testing; will await the rest of the test results through his [employer] account.

## 2021-08-17 LAB — VITAMIN B6: 83.6 NMOL/L

## 2021-08-18 LAB — VITAMIN B1 (THIAMINE), WHOLE B: 126 NMOL/L

## 2021-08-24 NOTE — TELEPHONE ENCOUNTER
As you might have seen, all labs were within normal range. No immediate other action required, please follow up with me in 2 months as instructed.

## 2021-08-30 ENCOUNTER — MED REC SCAN ONLY (OUTPATIENT)
Dept: FAMILY MEDICINE CLINIC | Facility: CLINIC | Age: 46
End: 2021-08-30

## 2021-08-31 ENCOUNTER — OFFICE VISIT (OUTPATIENT)
Dept: ORTHOPEDICS CLINIC | Facility: CLINIC | Age: 46
End: 2021-08-31
Payer: COMMERCIAL

## 2021-08-31 ENCOUNTER — TELEPHONE (OUTPATIENT)
Dept: SURGERY | Facility: CLINIC | Age: 46
End: 2021-08-31

## 2021-08-31 VITALS — WEIGHT: 175 LBS | BODY MASS INDEX: 22.46 KG/M2 | HEIGHT: 74 IN

## 2021-08-31 DIAGNOSIS — M92.61 HAGLUND'S DEFORMITY OF BOTH HEELS: Primary | ICD-10-CM

## 2021-08-31 DIAGNOSIS — M92.62 HAGLUND'S DEFORMITY OF BOTH HEELS: Primary | ICD-10-CM

## 2021-08-31 DIAGNOSIS — B35.3 TINEA PEDIS OF BOTH FEET: ICD-10-CM

## 2021-08-31 PROCEDURE — 3008F BODY MASS INDEX DOCD: CPT | Performed by: PODIATRIST

## 2021-08-31 PROCEDURE — 99202 OFFICE O/P NEW SF 15 MIN: CPT | Performed by: PODIATRIST

## 2021-08-31 RX ORDER — CLOBETASOL PROPIONATE 0.5 MG/G
OINTMENT TOPICAL
COMMUNITY
Start: 2021-08-18

## 2021-08-31 NOTE — PROGRESS NOTES
[facility] New Patient Note    CC: Patient presents with: Foot Pain: Patient is here for bilateral foot pain.        HPI: The patient is a 55year old male who presents today with complaints of bilateral foot pain, \"bone sticking out of back o ROS:  Complete ROS reviewed by me and non-contributory to the chief complaint except as mentioned above. Physical Exam:    Ht 6' 2\" (1.88 m)   Wt 175 lb (79.4 kg)   BMI 22.47 kg/m²   Neurovascular status is intact distally.   Enlarged palpable bone

## 2021-08-31 NOTE — TELEPHONE ENCOUNTER
Received pt reminder to call pt and remind to have imaging done prior to his follow up appt with Radha Late on 9/21/21. Called and spoke to pt who stated he does have this already scheduled with [place] imaging.      Nothing further needed

## 2021-09-15 ENCOUNTER — TELEPHONE (OUTPATIENT)
Dept: SURGERY | Facility: CLINIC | Age: 46
End: 2021-09-15

## 2021-09-15 NOTE — TELEPHONE ENCOUNTER
Received MRI Brain W/O Contrast report. DOS 9/15/21 from [facility]. Forwarded to Provider for review.

## 2021-09-17 ENCOUNTER — PATIENT MESSAGE (OUTPATIENT)
Dept: SURGERY | Facility: CLINIC | Age: 46
End: 2021-09-17

## 2021-09-17 NOTE — TELEPHONE ENCOUNTER
From: Kylie Boyd  To: Carolina Davis  Sent: 9/17/2021 11:49 AM CDT  Subject: MRI    Hi Alis,  We receive the report of you brain MRI from [address].  Cookie Gonzáles reviewed it and he would like you to bring in the CD of the imaging to your appointment

## 2021-09-17 NOTE — TELEPHONE ENCOUNTER
To follow up on this TE, in reply to Nursing message, patient sent [employer] message asking: \"Will do. Is everything ok? \"  Routed to provider in alternate TE.

## 2021-09-21 ENCOUNTER — TELEPHONE (OUTPATIENT)
Dept: SURGERY | Facility: CLINIC | Age: 46
End: 2021-09-21

## 2021-09-21 ENCOUNTER — OFFICE VISIT (OUTPATIENT)
Dept: SURGERY | Facility: CLINIC | Age: 46
End: 2021-09-21
Payer: COMMERCIAL

## 2021-09-21 VITALS
HEART RATE: 80 BPM | SYSTOLIC BLOOD PRESSURE: 118 MMHG | WEIGHT: 175 LBS | BODY MASS INDEX: 22.46 KG/M2 | DIASTOLIC BLOOD PRESSURE: 80 MMHG | HEIGHT: 74 IN

## 2021-09-21 DIAGNOSIS — D36.9: Primary | ICD-10-CM

## 2021-09-21 PROCEDURE — 99213 OFFICE O/P EST LOW 20 MIN: CPT | Performed by: PHYSICIAN ASSISTANT

## 2021-09-21 PROCEDURE — 3074F SYST BP LT 130 MM HG: CPT | Performed by: PHYSICIAN ASSISTANT

## 2021-09-21 PROCEDURE — 3008F BODY MASS INDEX DOCD: CPT | Performed by: PHYSICIAN ASSISTANT

## 2021-09-21 PROCEDURE — 3079F DIAST BP 80-89 MM HG: CPT | Performed by: PHYSICIAN ASSISTANT

## 2021-09-21 NOTE — PROGRESS NOTES
Pt is f/u for sx in march regarding tumor       Pain is in his feet 5/10  Regarding sx patient is doing well

## 2021-09-21 NOTE — PROGRESS NOTES
Neurosurgery Clinic Visit  2021    Rose Son PCP:  Elvira Ohara MD    1975 MRN [id number]       CC:  S/P R Crani for ganglioglioma 3/11/21    HPI:    Alis is here for 6 month post op appt. He is doing well in regards to surgery. (SEE COMMENTS)    Comment:Burn type inflammation on skin.     MEDICATIONS:    Prescriptions Prior to Admission   (Not in a hospital admission)      No current facility-administered medications for this visit.        REVIEW OF SYSTEMS:  A 10-point system wa

## 2021-09-21 NOTE — TELEPHONE ENCOUNTER
Pt brought in MRI brain imaging disc. Downloaded to [facility]. Disc returned to pt.

## 2021-10-06 ENCOUNTER — OFFICE VISIT (OUTPATIENT)
Dept: NEUROLOGY | Facility: CLINIC | Age: 46
End: 2021-10-06
Payer: COMMERCIAL

## 2021-10-06 VITALS
SYSTOLIC BLOOD PRESSURE: 100 MMHG | HEART RATE: 68 BPM | DIASTOLIC BLOOD PRESSURE: 50 MMHG | RESPIRATION RATE: 18 BRPM | HEIGHT: 74 IN | WEIGHT: 175 LBS | BODY MASS INDEX: 22.46 KG/M2

## 2021-10-06 DIAGNOSIS — R20.2 PARESTHESIA: ICD-10-CM

## 2021-10-06 DIAGNOSIS — G40.909 SEIZURE DISORDER (HCC): Primary | ICD-10-CM

## 2021-10-06 PROCEDURE — 99214 OFFICE O/P EST MOD 30 MIN: CPT | Performed by: OTHER

## 2021-10-06 PROCEDURE — 3074F SYST BP LT 130 MM HG: CPT | Performed by: OTHER

## 2021-10-06 PROCEDURE — 3078F DIAST BP <80 MM HG: CPT | Performed by: OTHER

## 2021-10-06 PROCEDURE — 3008F BODY MASS INDEX DOCD: CPT | Performed by: OTHER

## 2021-10-06 NOTE — PROGRESS NOTES
LOV 8/5/21 Seizure & Paresthesia in both feet f/u- Patient states he has remained seizure free since LOV. Patient states he is still experiencing bilateral foot Paresthesia & \"zapping\" has increased.  Patient states he would like to discuss MRI test from

## 2021-10-06 NOTE — PROGRESS NOTES
[facility] Neurology Outpatient Progress Note  Date of service: 10/6/2021    Patient here for a follow-up visit for Seizure & Paresthesia in both feet f/u- Patient states he has remained seizure free since LOV.  Patient states he is still experiencing bilateral fo A DAY, Disp: 180 tablet, Rfl: 2  Allergies:    Tegretol [Carbamaze*    OTHER (SEE COMMENTS)    Comment:Burn type inflammation on skin.   Past Medical History:   Diagnosis Date   • COVID-19    • Dilantin toxicity    • [facility]   • Visual i orders and medications filed with this encounter. The patient indicates understanding of these issues and agrees with the plan. Discussed with patient in detail regarding the adverse and side effects of the medications.   RTC 6 months or prn  Pt should go

## 2021-10-09 ENCOUNTER — PATIENT MESSAGE (OUTPATIENT)
Dept: NEUROLOGY | Facility: CLINIC | Age: 46
End: 2021-10-09

## 2021-10-11 ENCOUNTER — PATIENT MESSAGE (OUTPATIENT)
Dept: NEUROLOGY | Facility: CLINIC | Age: 46
End: 2021-10-11

## 2021-10-11 ENCOUNTER — LAB ENCOUNTER (OUTPATIENT)
Dept: LAB | Age: 46
End: 2021-10-11
Attending: FAMILY MEDICINE
Payer: COMMERCIAL

## 2021-10-11 DIAGNOSIS — D64.9 LOW HEMOGLOBIN: ICD-10-CM

## 2021-10-11 DIAGNOSIS — E78.5 HYPERLIPIDEMIA, UNSPECIFIED HYPERLIPIDEMIA TYPE: ICD-10-CM

## 2021-10-11 PROCEDURE — 83550 IRON BINDING TEST: CPT

## 2021-10-11 PROCEDURE — 82728 ASSAY OF FERRITIN: CPT

## 2021-10-11 PROCEDURE — 80053 COMPREHEN METABOLIC PANEL: CPT

## 2021-10-11 PROCEDURE — 83540 ASSAY OF IRON: CPT

## 2021-10-11 PROCEDURE — 80061 LIPID PANEL: CPT

## 2021-10-11 PROCEDURE — 85025 COMPLETE CBC W/AUTO DIFF WBC: CPT

## 2021-10-11 PROCEDURE — 36415 COLL VENOUS BLD VENIPUNCTURE: CPT

## 2021-10-11 NOTE — TELEPHONE ENCOUNTER
From: Anna Marie Phoenix  Sent: 10/11/2021 8:19 AM CDT  To: Lucie Agustin Nurse  Subject: Notes    So am I supposed to go to ER if new or worsening symptoms?

## 2021-10-11 NOTE — TELEPHONE ENCOUNTER
From: Bart Siddiqui  To: Karmen Mosqueda MD  Sent: 10/9/2021 3:33 PM CDT  Subject: Notes    Notes say I was told to visit ER if symptoms get worse. I was not told this. Is that correct?

## 2021-10-20 ENCOUNTER — PATIENT MESSAGE (OUTPATIENT)
Dept: SURGERY | Facility: CLINIC | Age: 46
End: 2021-10-20

## 2021-10-20 ENCOUNTER — TELEPHONE (OUTPATIENT)
Dept: SURGERY | Facility: CLINIC | Age: 46
End: 2021-10-20

## 2021-10-20 NOTE — TELEPHONE ENCOUNTER
Patient would like to speak with Ajay Hicks in regards to follow up questions he has from his OV. Patient also states he has a question about another provider he wanted to discuss with Ajay Hicks.  Please contact to assist.

## 2021-10-21 NOTE — TELEPHONE ENCOUNTER
From: [facility]  To: BARBI Jones  Sent: 10/20/2021 5:18 PM CDT  Subject: Back pain and neuropathy     Do you work with anyone who you would recommend seeing for my neuropathy?  I also think my upper back pain could be coming from work setup sitt

## 2021-10-21 NOTE — TELEPHONE ENCOUNTER
Per las OV note on 9-21-21     Alis is doing well in regards to surgery but is unfortunately suffering from symptoms of AED toxicity. MRI Brain w/ and w/o in 6 months. F/u at that time.

## 2021-10-28 ENCOUNTER — OFFICE VISIT (OUTPATIENT)
Dept: FAMILY MEDICINE CLINIC | Facility: CLINIC | Age: 46
End: 2021-10-28
Payer: COMMERCIAL

## 2021-10-28 VITALS
HEART RATE: 78 BPM | TEMPERATURE: 98 F | OXYGEN SATURATION: 98 % | SYSTOLIC BLOOD PRESSURE: 110 MMHG | DIASTOLIC BLOOD PRESSURE: 70 MMHG | WEIGHT: 183 LBS | HEIGHT: 74 IN | BODY MASS INDEX: 23.49 KG/M2 | RESPIRATION RATE: 14 BRPM

## 2021-10-28 DIAGNOSIS — S46.911A STRAIN OF RIGHT SHOULDER, INITIAL ENCOUNTER: ICD-10-CM

## 2021-10-28 DIAGNOSIS — Z23 NEED FOR VACCINATION: ICD-10-CM

## 2021-10-28 DIAGNOSIS — M62.830 BACK MUSCLE SPASM: Primary | ICD-10-CM

## 2021-10-28 PROCEDURE — 3008F BODY MASS INDEX DOCD: CPT | Performed by: FAMILY MEDICINE

## 2021-10-28 PROCEDURE — 99214 OFFICE O/P EST MOD 30 MIN: CPT | Performed by: FAMILY MEDICINE

## 2021-10-28 PROCEDURE — 3074F SYST BP LT 130 MM HG: CPT | Performed by: FAMILY MEDICINE

## 2021-10-28 PROCEDURE — 3078F DIAST BP <80 MM HG: CPT | Performed by: FAMILY MEDICINE

## 2021-10-28 NOTE — PATIENT INSTRUCTIONS
Thank you for choosing Elvira Ohara MD at [place] [address]  To Do: Rose Son  1. Please take meds as directed. [facility] is located in [address]. Monday, Tuesday & Friday – 8 a.m. to 4 p.m.   Wednesday, Thursday – 7 a.m. to 3 outweigh those potential risks and we strive to make you healthier and to improve your quality of life.     Referrals, and Radiology Information:    If your insurance requires a referral to a specialist, please allow 5 business days to process your referral done.   Home care  · As soon as possible, start sitting or walking again. This will help prevent problems from a long bed rest. These problems include muscle weakness, worsening back stiffness and pain, and blood clots in the legs.   · When in bed, try to f medicine, especially if you have other medical problems or are taking other medicines. You may use over-the-counter medicines such as acetaminophen, ibuprofen, or naprosyn to control pain, unless your healthcare provider prescribed another pain medicine.

## 2021-10-28 NOTE — PROGRESS NOTES
Subjective:   Patient ID: Gretta Lorenzana is a 55year old male.     HPI  Mr. Javad Suarez is a pleasant 54 y/o M with PMHx of R temporal lobe mass (ganglionoma) s/p craniotomy on 3/11/21, seizures/epilepsy when he was 15 y/o, Lumbar DJD, peripheral neuropat (20 mg total) by mouth nightly. 90 tablet 1   • LEVETIRACETAM 1000 MG Oral Tab TAKE 1 TABLET BY MOUTH TWICE A  tablet 2     Allergies:  Tegretol [Carbamaze*    OTHER (SEE COMMENTS)    Comment:Burn type inflammation on skin.     Objective:   Physical This Visit:  Requested Prescriptions      No prescriptions requested or ordered in this encounter       Imaging & Referrals:  FLULAVAL INFLUENZA VACCINE QUAD PRESERVATIVE FREE 0.5 ML

## 2021-11-01 ENCOUNTER — PATIENT MESSAGE (OUTPATIENT)
Dept: FAMILY MEDICINE CLINIC | Facility: CLINIC | Age: 46
End: 2021-11-01

## 2021-11-01 DIAGNOSIS — S46.911A STRAIN OF RIGHT SHOULDER, INITIAL ENCOUNTER: ICD-10-CM

## 2021-11-01 DIAGNOSIS — M62.830 BACK MUSCLE SPASM: Primary | ICD-10-CM

## 2021-11-02 NOTE — TELEPHONE ENCOUNTER
From: Gerianne Severe  To: Krystal Cuba MD  Sent: 11/1/2021 5:55 PM CDT  Subject: Physical therapy for arm, shoulders, and back    Could you send me a referral? My pain is pretty bad and not getting any immediate resolution at work. Thanks.

## 2021-11-08 ENCOUNTER — PATIENT MESSAGE (OUTPATIENT)
Dept: FAMILY MEDICINE CLINIC | Facility: CLINIC | Age: 46
End: 2021-11-08

## 2021-11-10 ENCOUNTER — PATIENT MESSAGE (OUTPATIENT)
Dept: FAMILY MEDICINE CLINIC | Facility: CLINIC | Age: 46
End: 2021-11-10

## 2021-11-10 DIAGNOSIS — G89.29 CHRONIC RIGHT SHOULDER PAIN: Primary | ICD-10-CM

## 2021-11-10 DIAGNOSIS — M25.511 CHRONIC RIGHT SHOULDER PAIN: Primary | ICD-10-CM

## 2021-11-10 NOTE — TELEPHONE ENCOUNTER
Claire Scott MD 24 minutes ago (1:41 PM)       Thank you. Any stretches I should be doing while waiting?         Please advise on stretches

## 2021-11-10 NOTE — TELEPHONE ENCOUNTER
From: Gretta Lorenzana  To: Eryn Carreon MD  Sent: 11/1/2021 5:55 PM CDT  Subject: Physical therapy for arm, shoulders, and back    Could you send me a referral? My pain is pretty bad and not getting any immediate resolution at work. Thanks.

## 2021-11-10 NOTE — TELEPHONE ENCOUNTER
Not yet I really would want for him to see physical therapy first and see what they would recommend.

## 2021-11-24 DIAGNOSIS — E78.5 HYPERLIPIDEMIA, UNSPECIFIED HYPERLIPIDEMIA TYPE: ICD-10-CM

## 2021-11-24 RX ORDER — ATORVASTATIN CALCIUM 20 MG/1
TABLET, FILM COATED ORAL
Qty: 90 TABLET | Refills: 1 | Status: SHIPPED | OUTPATIENT
Start: 2021-11-24 | End: 2022-01-05

## 2021-12-17 ENCOUNTER — TELEPHONE (OUTPATIENT)
Dept: FAMILY MEDICINE CLINIC | Facility: CLINIC | Age: 46
End: 2021-12-17

## 2021-12-21 ENCOUNTER — TELEPHONE (OUTPATIENT)
Dept: FAMILY MEDICINE CLINIC | Facility: CLINIC | Age: 46
End: 2021-12-21

## 2021-12-21 ENCOUNTER — APPOINTMENT (OUTPATIENT)
Dept: PHYSICAL THERAPY | Age: 46
End: 2021-12-21
Attending: FAMILY MEDICINE
Payer: COMMERCIAL

## 2021-12-21 NOTE — TELEPHONE ENCOUNTER
Patient states he is getting MRI's right now but the one that was scheduled next week for his shoulder was cancelled and he was told to speak to a nurse, please advise. He would like a detailed VM on his cell phone.

## 2021-12-23 ENCOUNTER — APPOINTMENT (OUTPATIENT)
Dept: PHYSICAL THERAPY | Age: 46
End: 2021-12-23
Attending: FAMILY MEDICINE
Payer: COMMERCIAL

## 2021-12-28 ENCOUNTER — APPOINTMENT (OUTPATIENT)
Dept: PHYSICAL THERAPY | Age: 46
End: 2021-12-28
Attending: FAMILY MEDICINE
Payer: COMMERCIAL

## 2021-12-30 ENCOUNTER — APPOINTMENT (OUTPATIENT)
Dept: PHYSICAL THERAPY | Age: 46
End: 2021-12-30
Attending: FAMILY MEDICINE
Payer: COMMERCIAL

## 2021-12-31 ENCOUNTER — PATIENT MESSAGE (OUTPATIENT)
Dept: FAMILY MEDICINE CLINIC | Facility: CLINIC | Age: 46
End: 2021-12-31

## 2021-12-31 DIAGNOSIS — M25.511 CHRONIC RIGHT SHOULDER PAIN: ICD-10-CM

## 2021-12-31 DIAGNOSIS — M51.37 DEGENERATION OF LUMBAR OR LUMBOSACRAL INTERVERTEBRAL DISC: Primary | ICD-10-CM

## 2021-12-31 DIAGNOSIS — G89.29 CHRONIC RIGHT SHOULDER PAIN: ICD-10-CM

## 2022-01-03 NOTE — TELEPHONE ENCOUNTER
From: Cj Encinas  To: Jarrett Ruff MD  Sent: 12/31/2021 12:27 PM CST  Subject: Cyst    I have developed what I think is a cyst in my groin that popped. Not the first time this has happened. Let me know if you want me to get it checked out.  Seei

## 2022-01-04 ENCOUNTER — APPOINTMENT (OUTPATIENT)
Dept: PHYSICAL THERAPY | Age: 47
End: 2022-01-04
Attending: FAMILY MEDICINE
Payer: COMMERCIAL

## 2022-01-05 ENCOUNTER — OFFICE VISIT (OUTPATIENT)
Dept: FAMILY MEDICINE CLINIC | Facility: CLINIC | Age: 47
End: 2022-01-05
Payer: COMMERCIAL

## 2022-01-05 VITALS
TEMPERATURE: 97 F | HEART RATE: 72 BPM | WEIGHT: 186 LBS | RESPIRATION RATE: 16 BRPM | OXYGEN SATURATION: 99 % | SYSTOLIC BLOOD PRESSURE: 114 MMHG | DIASTOLIC BLOOD PRESSURE: 80 MMHG | BODY MASS INDEX: 23.87 KG/M2 | HEIGHT: 74 IN

## 2022-01-05 DIAGNOSIS — L72.3 SEBACEOUS CYST OF SCROTUM: Primary | ICD-10-CM

## 2022-01-05 PROCEDURE — 3074F SYST BP LT 130 MM HG: CPT | Performed by: FAMILY MEDICINE

## 2022-01-05 PROCEDURE — 3008F BODY MASS INDEX DOCD: CPT | Performed by: FAMILY MEDICINE

## 2022-01-05 PROCEDURE — 99213 OFFICE O/P EST LOW 20 MIN: CPT | Performed by: FAMILY MEDICINE

## 2022-01-05 PROCEDURE — 3079F DIAST BP 80-89 MM HG: CPT | Performed by: FAMILY MEDICINE

## 2022-01-05 NOTE — PROGRESS NOTES
Subjective:   Patient ID: Harriett Robles is a 55year old male. HPI  Mr. Love Jorgensen is a very pleasant 51-year-old gentleman well-known to me here today for cyst on his left scrotum area which he had noticed several days ago.  He bikes on a regular bas to monitor this as this may recur. We shall await evaluation and recommendations from the [facility] clinic. This note was prepared using [employer] voice recognition dictation software. As a result errors may occur.  When identified these errors have bee

## 2022-01-05 NOTE — PATIENT INSTRUCTIONS
Thank you for choosing Lore Correia MD at [place] [address]  To Do: Melburn Shape  1. Please see info  [facility] is located in [address]. Monday, Tuesday & Friday – 8 a.m. to 4 p.m. Wednesday, Thursday – 7 a.m. to 3 p.m.   The lab i potential risks and we strive to make you healthier and to improve your quality of life.     Referrals, and Radiology Information:    If your insurance requires a referral to a specialist, please allow 5 business days to process your referral request.    If and you can usually move it slightly if you try. · The cyst can be smaller than a pea or as large as a few inches. · It's usually not painful, unless it becomes inflamed or infected.   Causes  Epidermoid cysts are caused when skin (epidermal) cells move u

## 2022-01-06 ENCOUNTER — APPOINTMENT (OUTPATIENT)
Dept: PHYSICAL THERAPY | Age: 47
End: 2022-01-06
Attending: FAMILY MEDICINE
Payer: COMMERCIAL

## 2022-01-11 ENCOUNTER — APPOINTMENT (OUTPATIENT)
Dept: PHYSICAL THERAPY | Age: 47
End: 2022-01-11
Attending: FAMILY MEDICINE
Payer: COMMERCIAL

## 2022-01-14 ENCOUNTER — APPOINTMENT (OUTPATIENT)
Dept: PHYSICAL THERAPY | Age: 47
End: 2022-01-14
Attending: FAMILY MEDICINE
Payer: COMMERCIAL

## 2022-01-24 ENCOUNTER — TELEPHONE (OUTPATIENT)
Dept: ORTHOPEDICS CLINIC | Facility: CLINIC | Age: 47
End: 2022-01-24

## 2022-01-24 DIAGNOSIS — M25.511 RIGHT SHOULDER PAIN, UNSPECIFIED CHRONICITY: Primary | ICD-10-CM

## 2022-01-24 NOTE — TELEPHONE ENCOUNTER
Reviewed patients chart, xray orders are required. Order placed for right shoulder xrays.  Please contact patient advise to arrive 30 mins prior to patients appt to complete x-ray order and schedule patients xray appt-Thank you

## 2022-01-28 ENCOUNTER — LAB ENCOUNTER (OUTPATIENT)
Dept: LAB | Age: 47
End: 2022-01-28
Attending: SURGERY
Payer: COMMERCIAL

## 2022-01-28 DIAGNOSIS — D49.6 BRAIN TUMOR (HCC): ICD-10-CM

## 2022-01-28 DIAGNOSIS — G44.1 VASCULAR HEADACHE: ICD-10-CM

## 2022-01-28 DIAGNOSIS — Z00.00 EXAMINATION, MEDICAL, GENERAL: Primary | ICD-10-CM

## 2022-01-28 LAB
ALBUMIN SERPL-MCNC: 4.1 G/DL (ref 3.4–5)
ALBUMIN/GLOB SERPL: 1.1 {RATIO} (ref 1–2)
ALP LIVER SERPL-CCNC: 85 U/L
ALT SERPL-CCNC: 49 U/L
ANION GAP SERPL CALC-SCNC: 4 MMOL/L (ref 0–18)
AST SERPL-CCNC: 20 U/L (ref 15–37)
BASOPHILS # BLD AUTO: 0.08 X10(3) UL (ref 0–0.2)
BASOPHILS NFR BLD AUTO: 1.3 %
BILIRUB SERPL-MCNC: 0.5 MG/DL (ref 0.1–2)
BILIRUB UR QL STRIP.AUTO: NEGATIVE
BUN BLD-MCNC: 17 MG/DL (ref 7–18)
CALCIUM BLD-MCNC: 9.6 MG/DL (ref 8.5–10.1)
CHLORIDE SERPL-SCNC: 107 MMOL/L (ref 98–112)
CLARITY UR REFRACT.AUTO: CLEAR
CO2 SERPL-SCNC: 29 MMOL/L (ref 21–32)
COLOR UR AUTO: YELLOW
CREAT BLD-MCNC: 0.7 MG/DL
EOSINOPHIL # BLD AUTO: 0.39 X10(3) UL (ref 0–0.7)
EOSINOPHIL NFR BLD AUTO: 6.4 %
ERYTHROCYTE [DISTWIDTH] IN BLOOD BY AUTOMATED COUNT: 13.5 %
FASTING PATIENT LIPID ANSWER: YES
FASTING STATUS PATIENT QL REPORTED: YES
FOLATE SERPL-MCNC: 41.5 NG/ML (ref 8.7–?)
GLOBULIN PLAS-MCNC: 3.6 G/DL (ref 2.8–4.4)
GLUCOSE BLD-MCNC: 96 MG/DL (ref 70–99)
GLUCOSE UR STRIP.AUTO-MCNC: NEGATIVE MG/DL
HCT VFR BLD AUTO: 39.8 %
HDLC SERPL-MCNC: 59 MG/DL (ref 40–59)
HGB BLD-MCNC: 13.2 G/DL
IMM GRANULOCYTES # BLD AUTO: 0.01 X10(3) UL (ref 0–1)
IMM GRANULOCYTES NFR BLD: 0.2 %
KETONES UR STRIP.AUTO-MCNC: 20 MG/DL
LDLC SERPL CALC-MCNC: 217 MG/DL (ref ?–100)
LEUKOCYTE ESTERASE UR QL STRIP.AUTO: NEGATIVE
LYMPHOCYTES # BLD AUTO: 2.07 X10(3) UL (ref 1–4)
LYMPHOCYTES NFR BLD AUTO: 33.7 %
MCH RBC QN AUTO: 31.5 PG (ref 26–34)
MCHC RBC AUTO-ENTMCNC: 33.2 G/DL (ref 31–37)
MCV RBC AUTO: 95 FL
MONOCYTES # BLD AUTO: 0.63 X10(3) UL (ref 0.1–1)
MONOCYTES NFR BLD AUTO: 10.3 %
NEUTROPHILS # BLD AUTO: 2.96 X10 (3) UL (ref 1.5–7.7)
NEUTROPHILS # BLD AUTO: 2.96 X10(3) UL (ref 1.5–7.7)
NEUTROPHILS NFR BLD AUTO: 48.1 %
NITRITE UR QL STRIP.AUTO: NEGATIVE
NONHDLC SERPL-MCNC: 223 MG/DL (ref ?–130)
OSMOLALITY SERPL CALC.SUM OF ELEC: 291 MOSM/KG (ref 275–295)
PH UR STRIP.AUTO: 5 [PH] (ref 5–8)
PLATELET # BLD AUTO: 271 10(3)UL (ref 150–450)
POTASSIUM SERPL-SCNC: 4.5 MMOL/L (ref 3.5–5.1)
PROT SERPL-MCNC: 7.7 G/DL (ref 6.4–8.2)
PROT UR STRIP.AUTO-MCNC: NEGATIVE MG/DL
RBC # BLD AUTO: 4.19 X10(6)UL
RBC UR QL AUTO: NEGATIVE
SODIUM SERPL-SCNC: 140 MMOL/L (ref 136–145)
SP GR UR STRIP.AUTO: 1.02 (ref 1–1.03)
TRIGL SERPL-MCNC: 46 MG/DL (ref 30–149)
TSI SER-ACNC: 0.61 MIU/ML (ref 0.36–3.74)
UROBILINOGEN UR STRIP.AUTO-MCNC: <2 MG/DL
VIT D+METAB SERPL-MCNC: 34 NG/ML (ref 30–100)
VLDLC SERPL CALC-MCNC: 10 MG/DL (ref 0–30)
WBC # BLD AUTO: 6.1 X10(3) UL (ref 4–11)

## 2022-01-28 PROCEDURE — 83516 IMMUNOASSAY NONANTIBODY: CPT

## 2022-01-28 PROCEDURE — 83519 RIA NONANTIBODY: CPT

## 2022-01-28 PROCEDURE — 80053 COMPREHEN METABOLIC PANEL: CPT

## 2022-01-28 PROCEDURE — 84443 ASSAY THYROID STIM HORMONE: CPT

## 2022-01-28 PROCEDURE — 82306 VITAMIN D 25 HYDROXY: CPT

## 2022-01-28 PROCEDURE — 80061 LIPID PANEL: CPT

## 2022-01-28 PROCEDURE — 81003 URINALYSIS AUTO W/O SCOPE: CPT

## 2022-01-28 PROCEDURE — 36415 COLL VENOUS BLD VENIPUNCTURE: CPT

## 2022-01-28 PROCEDURE — 85025 COMPLETE CBC W/AUTO DIFF WBC: CPT

## 2022-01-28 PROCEDURE — 82746 ASSAY OF FOLIC ACID SERUM: CPT

## 2022-01-30 LAB — ACETYLCHOLINE BINDING AB: 0 NMOL/L

## 2022-02-01 LAB — ACETYLCHOLINE BLOCKING AB: 18 %

## 2022-02-02 ENCOUNTER — TELEPHONE (OUTPATIENT)
Dept: SURGERY | Facility: CLINIC | Age: 47
End: 2022-02-02

## 2022-02-02 DIAGNOSIS — D43.2 GANGLIOGLIOMA OF BRAIN (HCC): Primary | ICD-10-CM

## 2022-02-02 NOTE — TELEPHONE ENCOUNTER
Imaging Reminder  Received: Zee Lowery 2 Nurse  MRI Brain w/ and w/o due March 2022       Routed to provider for order

## 2022-02-08 ENCOUNTER — TELEPHONE (OUTPATIENT)
Dept: NEUROLOGY | Facility: CLINIC | Age: 47
End: 2022-02-08

## 2022-02-08 NOTE — TELEPHONE ENCOUNTER
Sisi Melendez with [facility] req a copy of MRI brain order for pt's appt today; faxed to [phone], confirmation rec'd

## 2022-02-09 ENCOUNTER — OFFICE VISIT (OUTPATIENT)
Dept: PHYSICAL MEDICINE AND REHAB | Facility: CLINIC | Age: 47
End: 2022-02-09
Payer: COMMERCIAL

## 2022-02-09 ENCOUNTER — TELEPHONE (OUTPATIENT)
Dept: NEUROLOGY | Facility: CLINIC | Age: 47
End: 2022-02-09

## 2022-02-09 VITALS
WEIGHT: 186 LBS | HEIGHT: 74 IN | SYSTOLIC BLOOD PRESSURE: 110 MMHG | HEART RATE: 76 BPM | DIASTOLIC BLOOD PRESSURE: 72 MMHG | OXYGEN SATURATION: 98 % | BODY MASS INDEX: 23.87 KG/M2

## 2022-02-09 DIAGNOSIS — M25.611 DECREASED RANGE OF MOTION OF RIGHT SHOULDER: Primary | ICD-10-CM

## 2022-02-09 PROCEDURE — 3074F SYST BP LT 130 MM HG: CPT | Performed by: PHYSICAL MEDICINE & REHABILITATION

## 2022-02-09 PROCEDURE — 3008F BODY MASS INDEX DOCD: CPT | Performed by: PHYSICAL MEDICINE & REHABILITATION

## 2022-02-09 PROCEDURE — 99204 OFFICE O/P NEW MOD 45 MIN: CPT | Performed by: PHYSICAL MEDICINE & REHABILITATION

## 2022-02-09 PROCEDURE — 3078F DIAST BP <80 MM HG: CPT | Performed by: PHYSICAL MEDICINE & REHABILITATION

## 2022-02-09 NOTE — TELEPHONE ENCOUNTER
Rec'd MRI Brain WW/O Contrast, from [facility], dated on 2/9/22; placed in nurses bin for review

## 2022-02-09 NOTE — H&P
History and Physical    C/C: numbness and tingling from the lower chest/upper abdomen down to the feet; decreased ROM of the left hip. HPI: 55year old presents with pain, numbness for approximately 4 years. He awoke from bed and had a feeling of numbness and tingling from the lower chest down to the feet. He underwent an MRI of the lumbar spine, was told that there were no issues with the lower back at the time. He dealt with the symptoms, until one day during the pandemic he awoke with double vision, was falling down the stairs. He was seen virtually, did PT for vertigo, was told that the symptoms are not from vertigo and was referred back to physicians for further workup. 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 He was also seen by neurology, found to have a brain tumor, states blood clot in his brain. He underwent craniotomy with Dr. Martha Heller. Has been seen at the [facility] clinic. Was told he does not have MS. He has now been off dilantin for 1 year. States legs feel heavy, \"like tree stumps. \" He has numbness, tingling in the legs without pain. He is able to walk, but not able to do much more than that. He also has \"zapping\" feelings in the legs with muscle spasms, and twitching in the jaws. Over the summer developed shooting pain along the lateral aspect of the right arm. Did physical therapy x2 months, but is still unable to raise the right arm as fully as the right. He also has upper back pain described as stabbing, neck pain. No lower back pain currently. He has had injections years ago. States it's been years since he had lower back pain. Per EMG report no evidence of radiculopathy. He is still on keppra, and is still taking gabapentin. Symptoms are manageable, thinks that he can live with his current symptoms if they improve or at least not worsen.      He also has been having right shoulder pain months ago, and has had decreased ROM of the right shoulder without inciting event. Has had physical therapy. Has had an XR of the right shoulder. No MRI. No previous history of shoulder disorders. Pertinent family history: Allergies: tegretol    Physical exam:  BMI 23.88  /72  HR 76  O2 sat 98%    Lumbar spine exam:    No pain with lumbar flexion. No pain with lumbar extension. 5/5 LE strength b/l UE and LE  Decreased sensation to LT bilateral lower extremities; normal upper extremities  2/4 quad, gastrocs reflexes b/l  Seated slump test negative  SLR negative bilaterally    PE right shoulder exam:    Range of motion: Forward flexion: 120 degrees  Abduction: 120 degrees  Internal rotation: buttocks  External rotation: restricted to PROM    Imaging: MRI lumbar spine independently reviewed dated 6/1/21; left paracentral/foraminal disc herniation with moderate central stenosis at L4-5. XR left shoulder reviewed, normal imaging. No appreciable joint space narrowing, or other findings of GH joint OA. EMG 5.13.21:  Impression:   1. Normal nerve conduction study. 2. There is no electrophysiologic evidence of acute bilateral   L2-S2 radiculopathy. A chronic L2-S2 radiculopathy cannot be completely excluded from   this study, Clinical correlation is recommended. Assessment and plan:  1) ? Small fiber neuropathy  2) decreased ROM of the right shoulder, ? Adhesive capsulitis    Recommend MRI of the right shoulder given the decreased ROM of the right shoulder despite physical therapy; suspect adhesive capsulitis. In terms of the symptoms from the upper abdomen down to the legs we discussed symptomatic treatment with increasing the dose of gabapentin or duloxetine; patient defers. Also discussed with patient that the symptoms are not related to the disc herniation; might explain symptoms in the legs, but certainly do not explain the symptoms in the abdomen. F/u after MRI is done.     Constance Bloom DO  Physical Medicine and [address] [place] [address]

## 2022-02-09 NOTE — TELEPHONE ENCOUNTER
Patient was already informed of authorization on 02/03/22. Patient is having testing done at [address].     Prior authorization request completed for: MRI Brain W/WO  Authorization # [id number] @ [facility] dates: 02/03/22 - 05/04/22              CPT codes approved: 53927  Number of visits/dates of service approved: 1  Physician: Sacha Chong / Lazarus Sauers PA-C                                                                          Patient given above info, and informed to get imaging disc to bring to provider

## 2022-02-10 ENCOUNTER — TELEPHONE (OUTPATIENT)
Dept: PHYSICAL MEDICINE AND REHAB | Facility: CLINIC | Age: 47
End: 2022-02-10

## 2022-02-10 NOTE — TELEPHONE ENCOUNTER
Initiated authorization for Right Shoulder MRI CPT 34835 to be done at [facility] with [address] online  Case #[id number]. [employer] requested clinicals-clinicals uploaded   Status: pending  fyi-There is a request for a same or similar procedure code for this patient. This case will be sent to medical review.

## 2022-02-11 ENCOUNTER — PATIENT MESSAGE (OUTPATIENT)
Dept: PHYSICAL MEDICINE AND REHAB | Facility: CLINIC | Age: 47
End: 2022-02-11

## 2022-02-14 ENCOUNTER — PATIENT MESSAGE (OUTPATIENT)
Dept: PHYSICAL MEDICINE AND REHAB | Facility: CLINIC | Age: 47
End: 2022-02-14

## 2022-02-14 NOTE — TELEPHONE ENCOUNTER
Received Approval from [facility] for Right Shoulder MRI with authorization #[id number] valid 2/10/22-5/11/22 to be done at [address] ([phone]) at [address], [address]    Patient notified via [employer]

## 2022-02-14 NOTE — TELEPHONE ENCOUNTER
From: Dontrell Ellis  To: Kulwant Mcintyre DO  Sent: 2/14/2022 11:29 AM CST  Subject: Shoulder    Kiersten Harding from [facility] was able to see T spine MRI. He messaged me that there was some spurring and an abnormal signal. He mention the spurring could cause pain in right arm. I am scheduling shoulder MRI. Let me know if not needed.

## 2022-02-16 ENCOUNTER — PATIENT MESSAGE (OUTPATIENT)
Dept: PHYSICAL MEDICINE AND REHAB | Facility: CLINIC | Age: 47
End: 2022-02-16

## 2022-02-16 ENCOUNTER — MED REC SCAN ONLY (OUTPATIENT)
Dept: PHYSICAL MEDICINE AND REHAB | Facility: CLINIC | Age: 47
End: 2022-02-16

## 2022-02-16 NOTE — TELEPHONE ENCOUNTER
S/w pt, requested MRI disc be dropped off. Pt going to Dr. Denisse Harden office tomorrow to also take another MRI disc and will ask if it can be submitted into the system. Pt can also drop off disc at [facility] on Friday 2/18/22 if needed.

## 2022-02-16 NOTE — TELEPHONE ENCOUNTER
From: Miguel Abdullahi  To: Kelley Hutson DO  Sent: 2/16/2022 4:48 PM CST  Subject: MRI    I had done Tuesday. You should have results. Please let me know how it looks.  Thanks

## 2022-02-17 ENCOUNTER — TELEPHONE (OUTPATIENT)
Dept: NEUROLOGY | Facility: CLINIC | Age: 47
End: 2022-02-17

## 2022-02-17 ENCOUNTER — PATIENT MESSAGE (OUTPATIENT)
Dept: PHYSICAL MEDICINE AND REHAB | Facility: CLINIC | Age: 47
End: 2022-02-17

## 2022-02-17 NOTE — TELEPHONE ENCOUNTER
pt brought in disc of MRI brain from [address] dated 2/9/22; uploaded into PACS; returned to pt

## 2022-02-17 NOTE — TELEPHONE ENCOUNTER
Was able to see the MRI of the thoracic spine; it was mislabeled. I do not see any spurring within the thoracic spine that would cause the shoulder symptoms. Proceed with subacromial bursa steroid injection as planned.

## 2022-02-17 NOTE — TELEPHONE ENCOUNTER
From: Jadon Moon  Sent: 2/17/2022 11:34 AM CST  To: Christina Carson  Subject: MRI    Had mri uploaded at [facility] in [place].

## 2022-02-17 NOTE — TELEPHONE ENCOUNTER
pt brought in disc of MRI shoulder from [address] dated 2/15/22; uploaded into PACS; returned to pt

## 2022-02-17 NOTE — TELEPHONE ENCOUNTER
Right subacromial bursa steroid injection under US guidance CPT CODE 78760,     Status: APPROVED.  Pre authorization is not required    [place], per automated system,notification or Prior Authorization is not required for the requested services  Reference call # [id number]    Notified Approved Referrals for scheduling

## 2022-02-17 NOTE — TELEPHONE ENCOUNTER
MRI right shoulder reviewed; partial rotator cuff tear, mild to moderate osteoarthritis in the right shoulder. Follow up to discuss treatment; might benefit from subacromial bursa steroid injection.

## 2022-02-18 ENCOUNTER — TELEPHONE (OUTPATIENT)
Dept: PHYSICAL MEDICINE AND REHAB | Facility: CLINIC | Age: 47
End: 2022-02-18

## 2022-02-18 NOTE — TELEPHONE ENCOUNTER
Spoke with patient states he spoke with Sh[facility]a-- patient was scheduled on 2/23/22 for injection & follow up on results.

## 2022-02-18 NOTE — TELEPHONE ENCOUNTER
S/w patient to relay Dr. Candance Shilling message regarding MRI t-spine. Expressed understandinig and thanks.

## 2022-02-21 ENCOUNTER — PATIENT MESSAGE (OUTPATIENT)
Dept: FAMILY MEDICINE CLINIC | Facility: CLINIC | Age: 47
End: 2022-02-21

## 2022-02-21 RX ORDER — LEVETIRACETAM 1000 MG/1
1000 TABLET ORAL 2 TIMES DAILY
Qty: 180 TABLET | Refills: 2 | Status: SHIPPED | OUTPATIENT
Start: 2022-02-21

## 2022-02-23 ENCOUNTER — OFFICE VISIT (OUTPATIENT)
Dept: PHYSICAL MEDICINE AND REHAB | Facility: CLINIC | Age: 47
End: 2022-02-23
Payer: COMMERCIAL

## 2022-02-23 DIAGNOSIS — M75.111 PARTIAL NONTRAUMATIC TEAR OF RIGHT ROTATOR CUFF: ICD-10-CM

## 2022-02-23 PROCEDURE — 20611 DRAIN/INJ JOINT/BURSA W/US: CPT | Performed by: PHYSICAL MEDICINE & REHABILITATION

## 2022-02-23 RX ORDER — LIDOCAINE HYDROCHLORIDE 10 MG/ML
3 INJECTION, SOLUTION INFILTRATION; PERINEURAL ONCE
Status: COMPLETED | OUTPATIENT
Start: 2022-02-23 | End: 2022-02-23

## 2022-02-23 RX ORDER — TRIAMCINOLONE ACETONIDE 40 MG/ML
40 INJECTION, SUSPENSION INTRA-ARTICULAR; INTRAMUSCULAR ONCE
Status: COMPLETED | OUTPATIENT
Start: 2022-02-23 | End: 2022-02-23

## 2022-02-23 NOTE — PROCEDURES
Dx: partial right rotator cuff tear  Procedure: right subacromial bursa steroid injection under US guidance    The patient is here for a right shoulder subdeltoid bursal injection done under ultrasound guidance. Under ultrasound guidance the right lateral subdeltoid bursa was identified and a jessica was placed on the patient's skin. The skin was cleaned with alcohol swabs x 3 and anesthetized with ethyl chloride spray. Then a 25 gauge needle was inserted under ultrasound guidance into the right lateral subdeltoid bursa. Aspiration was performed and no blood, fluid, or air was aspirated. The patient was then injected with 4 ml of 1.0 ml of 40 mg of Kenalog/ml and 3.0 ml of 1% lidocaine without epinephrine. The needle was removed and a band aid was applied. The patient will follow up in 4-8 week(s). Images were saved of the injection.     Wendy Tamayo DO  Physical Medicine and [address] [place] [address]

## 2022-03-01 ENCOUNTER — TELEPHONE (OUTPATIENT)
Dept: SURGERY | Facility: CLINIC | Age: 47
End: 2022-03-01

## 2022-03-01 ENCOUNTER — OFFICE VISIT (OUTPATIENT)
Dept: NEUROLOGY | Facility: CLINIC | Age: 47
End: 2022-03-01
Payer: COMMERCIAL

## 2022-03-01 VITALS
RESPIRATION RATE: 16 BRPM | OXYGEN SATURATION: 99 % | WEIGHT: 180 LBS | HEART RATE: 72 BPM | BODY MASS INDEX: 23 KG/M2 | SYSTOLIC BLOOD PRESSURE: 118 MMHG | DIASTOLIC BLOOD PRESSURE: 72 MMHG

## 2022-03-01 DIAGNOSIS — R20.2 PARESTHESIAS: Primary | ICD-10-CM

## 2022-03-01 DIAGNOSIS — G62.9 NEUROPATHY: ICD-10-CM

## 2022-03-01 PROCEDURE — 3078F DIAST BP <80 MM HG: CPT | Performed by: HOSPITALIST

## 2022-03-01 PROCEDURE — 99214 OFFICE O/P EST MOD 30 MIN: CPT | Performed by: HOSPITALIST

## 2022-03-01 PROCEDURE — 3074F SYST BP LT 130 MM HG: CPT | Performed by: HOSPITALIST

## 2022-03-01 RX ORDER — CYCLOSPORINE 0.5 MG/ML
EMULSION OPHTHALMIC
COMMUNITY
Start: 2022-01-28

## 2022-03-01 RX ORDER — GABAPENTIN 100 MG/1
100 CAPSULE ORAL 3 TIMES DAILY
COMMUNITY

## 2022-03-01 RX ORDER — ATORVASTATIN CALCIUM 20 MG/1
20 TABLET, FILM COATED ORAL NIGHTLY
COMMUNITY
Start: 2022-02-24

## 2022-03-01 NOTE — PROGRESS NOTES
Patient states \"zapping\" in both feet, as well as numbness and tingling. Patient states difficulty with balance and gait. Patient most recent fall was a couple of days ago. Patient denies recent seizures.

## 2022-03-01 NOTE — TELEPHONE ENCOUNTER
Pt will be going to [facility], can orders be updated to [facility] please.  Please advise pt if cannot so he can get print out to take to [address].

## 2022-03-03 ENCOUNTER — LAB ENCOUNTER (OUTPATIENT)
Dept: LAB | Age: 47
End: 2022-03-03
Attending: HOSPITALIST
Payer: COMMERCIAL

## 2022-03-03 DIAGNOSIS — R20.2 PARESTHESIAS: ICD-10-CM

## 2022-03-03 DIAGNOSIS — R20.2 PARESTHESIA: Primary | ICD-10-CM

## 2022-03-03 DIAGNOSIS — G62.9 NEUROPATHY: ICD-10-CM

## 2022-03-03 DIAGNOSIS — G62.9 PERIPHERAL NERVE DISORDER: ICD-10-CM

## 2022-03-03 LAB — MAGNESIUM SERPL-MCNC: 2.2 MG/DL (ref 1.6–2.6)

## 2022-03-03 PROCEDURE — 83521 IG LIGHT CHAINS FREE EACH: CPT

## 2022-03-03 PROCEDURE — 82525 ASSAY OF COPPER: CPT

## 2022-03-03 PROCEDURE — 84165 PROTEIN E-PHORESIS SERUM: CPT

## 2022-03-03 PROCEDURE — 83735 ASSAY OF MAGNESIUM: CPT | Performed by: HOSPITALIST

## 2022-03-03 PROCEDURE — 86334 IMMUNOFIX E-PHORESIS SERUM: CPT

## 2022-03-03 PROCEDURE — 86038 ANTINUCLEAR ANTIBODIES: CPT

## 2022-03-03 PROCEDURE — 36415 COLL VENOUS BLD VENIPUNCTURE: CPT

## 2022-03-03 PROCEDURE — 84446 ASSAY OF VITAMIN E: CPT

## 2022-03-06 LAB
ALPHA-TOCOPHEROL (VIT E) -MG/L: 8.1 MG/L
COPPER, SERUM: 90.9 UG/DL
GAMMA-TOCOPHEROL (VIT E) -MG/L: 0.8 MG/L

## 2022-03-07 ENCOUNTER — MED REC SCAN ONLY (OUTPATIENT)
Dept: PHYSICAL MEDICINE AND REHAB | Facility: CLINIC | Age: 47
End: 2022-03-07

## 2022-03-07 LAB — ANA SER QL: NEGATIVE

## 2022-03-10 LAB
ALBUMIN SERPL ELPH-MCNC: 4.46 G/DL (ref 3.75–5.21)
ALBUMIN/GLOB SERPL: 1.38 {RATIO} (ref 1–2)
ALPHA1 GLOB SERPL ELPH-MCNC: 0.28 G/DL (ref 0.19–0.46)
ALPHA2 GLOB SERPL ELPH-MCNC: 0.74 G/DL (ref 0.48–1.05)
B-GLOBULIN SERPL ELPH-MCNC: 0.92 G/DL (ref 0.68–1.23)
GAMMA GLOB SERPL ELPH-MCNC: 1.31 G/DL (ref 0.62–1.7)
KAPPA LC FREE SER-MCNC: 1.3 MG/DL (ref 0.33–1.94)
KAPPA LC FREE/LAMBDA FREE SER NEPH: 1.13 {RATIO} (ref 0.26–1.65)
LAMBDA LC FREE SERPL-MCNC: 1.15 MG/DL (ref 0.57–2.63)
PROT SERPL-MCNC: 7.7 G/DL (ref 6.4–8.2)

## 2022-04-05 ENCOUNTER — OFFICE VISIT (OUTPATIENT)
Dept: NEUROLOGY | Facility: CLINIC | Age: 47
End: 2022-04-05
Payer: COMMERCIAL

## 2022-04-05 VITALS
HEART RATE: 100 BPM | BODY MASS INDEX: 22.46 KG/M2 | RESPIRATION RATE: 16 BRPM | OXYGEN SATURATION: 98 % | WEIGHT: 175 LBS | DIASTOLIC BLOOD PRESSURE: 60 MMHG | SYSTOLIC BLOOD PRESSURE: 112 MMHG | HEIGHT: 74 IN

## 2022-04-05 DIAGNOSIS — R20.2 PARESTHESIAS: ICD-10-CM

## 2022-04-05 DIAGNOSIS — G40.909 SEIZURE DISORDER (HCC): Primary | ICD-10-CM

## 2022-04-05 NOTE — PROGRESS NOTES
LOV 3/1/22 Paresthesias/Seizure f/u- Patient states everything is pretty much the same. Patient states he has not fallen.

## 2022-04-21 ENCOUNTER — OFFICE VISIT (OUTPATIENT)
Dept: FAMILY MEDICINE CLINIC | Facility: CLINIC | Age: 47
End: 2022-04-21
Payer: COMMERCIAL

## 2022-04-21 VITALS
HEIGHT: 74 IN | BODY MASS INDEX: 22.59 KG/M2 | OXYGEN SATURATION: 100 % | HEART RATE: 88 BPM | DIASTOLIC BLOOD PRESSURE: 70 MMHG | WEIGHT: 176 LBS | RESPIRATION RATE: 16 BRPM | SYSTOLIC BLOOD PRESSURE: 120 MMHG | TEMPERATURE: 98 F

## 2022-04-21 DIAGNOSIS — K59.09 CHRONIC CONSTIPATION: ICD-10-CM

## 2022-04-21 DIAGNOSIS — G62.9 NEUROPATHY: Primary | ICD-10-CM

## 2022-04-21 PROCEDURE — 3008F BODY MASS INDEX DOCD: CPT | Performed by: FAMILY MEDICINE

## 2022-04-21 PROCEDURE — 3074F SYST BP LT 130 MM HG: CPT | Performed by: FAMILY MEDICINE

## 2022-04-21 PROCEDURE — 99214 OFFICE O/P EST MOD 30 MIN: CPT | Performed by: FAMILY MEDICINE

## 2022-04-21 PROCEDURE — 3078F DIAST BP <80 MM HG: CPT | Performed by: FAMILY MEDICINE

## 2022-04-21 RX ORDER — GABAPENTIN 300 MG/1
300 CAPSULE ORAL 3 TIMES DAILY
COMMUNITY
Start: 2022-04-05 | End: 2022-04-21 | Stop reason: ALTCHOICE

## 2022-04-21 RX ORDER — LINACLOTIDE 72 UG/1
1 CAPSULE, GELATIN COATED ORAL DAILY
Qty: 90 CAPSULE | Refills: 1 | Status: SHIPPED | OUTPATIENT
Start: 2022-04-21 | End: 2022-05-21

## 2022-04-27 ENCOUNTER — OFFICE VISIT (OUTPATIENT)
Dept: PHYSICAL MEDICINE AND REHAB | Facility: CLINIC | Age: 47
End: 2022-04-27
Payer: COMMERCIAL

## 2022-04-27 VITALS
WEIGHT: 176 LBS | SYSTOLIC BLOOD PRESSURE: 108 MMHG | DIASTOLIC BLOOD PRESSURE: 64 MMHG | BODY MASS INDEX: 22.59 KG/M2 | HEIGHT: 74 IN | HEART RATE: 88 BPM | OXYGEN SATURATION: 99 %

## 2022-04-27 DIAGNOSIS — M75.111 PARTIAL NONTRAUMATIC TEAR OF RIGHT ROTATOR CUFF: Primary | ICD-10-CM

## 2022-04-27 DIAGNOSIS — M25.611 DECREASED RANGE OF MOTION OF RIGHT SHOULDER: ICD-10-CM

## 2022-04-27 PROCEDURE — 3078F DIAST BP <80 MM HG: CPT | Performed by: PHYSICAL MEDICINE & REHABILITATION

## 2022-04-27 PROCEDURE — 3008F BODY MASS INDEX DOCD: CPT | Performed by: PHYSICAL MEDICINE & REHABILITATION

## 2022-04-27 PROCEDURE — 3074F SYST BP LT 130 MM HG: CPT | Performed by: PHYSICAL MEDICINE & REHABILITATION

## 2022-04-27 PROCEDURE — 99213 OFFICE O/P EST LOW 20 MIN: CPT | Performed by: PHYSICAL MEDICINE & REHABILITATION

## 2022-04-29 ENCOUNTER — PATIENT MESSAGE (OUTPATIENT)
Dept: FAMILY MEDICINE CLINIC | Facility: CLINIC | Age: 47
End: 2022-04-29

## 2022-05-13 ENCOUNTER — NURSE ONLY (OUTPATIENT)
Dept: ELECTROPHYSIOLOGY | Facility: HOSPITAL | Age: 47
End: 2022-05-13
Attending: HOSPITALIST
Payer: COMMERCIAL

## 2022-05-13 DIAGNOSIS — G40.909 SEIZURE DISORDER (HCC): ICD-10-CM

## 2022-05-13 PROCEDURE — 95819 EEG AWAKE AND ASLEEP: CPT | Performed by: HOSPITALIST

## 2022-05-18 ENCOUNTER — TELEPHONE (OUTPATIENT)
Dept: NEUROLOGY | Facility: CLINIC | Age: 47
End: 2022-05-18

## 2022-05-18 NOTE — TELEPHONE ENCOUNTER
pt is calling to see if Doctor had gotten the results from the EEG  and if so could he get a return call to discuss the results.

## 2022-05-20 ENCOUNTER — PATIENT MESSAGE (OUTPATIENT)
Dept: NEUROLOGY | Facility: CLINIC | Age: 47
End: 2022-05-20

## 2022-05-20 DIAGNOSIS — G40.909 SEIZURE DISORDER (HCC): Primary | ICD-10-CM

## 2022-05-21 DIAGNOSIS — E78.5 HYPERLIPIDEMIA, UNSPECIFIED: ICD-10-CM

## 2022-05-22 RX ORDER — ATORVASTATIN CALCIUM 20 MG/1
TABLET, FILM COATED ORAL
Qty: 90 TABLET | Refills: 1 | Status: SHIPPED | OUTPATIENT
Start: 2022-05-22

## 2022-05-23 NOTE — TELEPHONE ENCOUNTER
From: Erica Tolbert  To: Ninfa Serna MD  Sent: 5/20/2022 4:28 PM CDT  Subject: EEG    I have been waiting over a week now for results. Test was done there. Left a phone message 2 days ago. No follow up. Disappointing.     Alis

## 2022-05-23 NOTE — PROCEDURES
[facility] - ELECTROENCEPHALOGRAM (EEG) REPORT  Patient Name:  Romain Spencer   MRN / CSN:  [id number] / [id number]   Date of Birth / Age:  5/7/1975 /  52year old   Encounter Date:  5/13/2022         METHODS:  Twenty-two electrodes were applied according to the 10-20-electrode placement system on this routine  audio-video EEG. EKG monitoring, monopolar and bipolar montages are routinely utilized. The record was obtained on a digital system. OBJECT:  This is a 52year old year-old male with a PMH of epilepsy, status post epilepsy surgery. The EEG was requested to assess for epileptiform activity and change in mental status. State(s) of consciousness: Awake, drowsy, sleep    Relevant medications:   gabapentin 100 MG Oral Cap, Take 100 mg by mouth 3 (three) times daily. , Disp: , Rfl:   RESTASIS 0.05 % Ophthalmic Emulsion, , Disp: , Rfl:   levetiracetam 1000 MG Oral Tab, Take 1 tablet (1,000 mg total) by mouth 2 (two) times daily. , Disp: 180 tablet, Rfl: 2    No current facility-administered medications on file prior to visit. FINDINGS:  During relative maximal wakefulness there was a bilateral posterior dominant rhythm between 9 to 10 Hz (40-60 uV) that appeared symmetric and reactive. General background in this state largely consisted of alpha frequencies with a well-developed AP gradient. Transition to drowsiness and sleep characterized by background slowing. Nonspecific frontally predominant poly- sharp/spike waves were seen during drowsiness. No clear ictal events captured. No clinical events documented for review that would be consistent with a seizure. EEG was performed and unrevealing      IMPRESSION:  This was an unrevealing  routine EEG in what appeared to be the awake, drowsy, and asleep states. Frontally predominant poly- sharp/spike waves are of uncertain significance.   No definitive seizures captured      SIGNATURES:  Suzanne Rios MD   [facility]

## 2022-05-24 ENCOUNTER — PATIENT MESSAGE (OUTPATIENT)
Dept: FAMILY MEDICINE CLINIC | Facility: CLINIC | Age: 47
End: 2022-05-24

## 2022-05-24 ENCOUNTER — TELEPHONE (OUTPATIENT)
Dept: NEUROLOGY | Facility: CLINIC | Age: 47
End: 2022-05-24

## 2022-05-24 DIAGNOSIS — R10.9 ABDOMINAL PAIN, UNSPECIFIED ABDOMINAL LOCATION: ICD-10-CM

## 2022-05-24 DIAGNOSIS — K59.09 CHRONIC CONSTIPATION: Primary | ICD-10-CM

## 2022-05-24 RX ORDER — LEVETIRACETAM 750 MG/1
750 TABLET ORAL 2 TIMES DAILY
Qty: 60 TABLET | Refills: 1 | Status: SHIPPED | OUTPATIENT
Start: 2022-05-24

## 2022-05-24 NOTE — TELEPHONE ENCOUNTER
Discuss EEG results with the patient. Answered all questions. We will cut Keppra dose to 750 mg twice daily, down from 1000 mg twice daily. He was instructed to call me in 2 weeks with an update.   Will consider further tapering

## 2022-06-14 ENCOUNTER — OFFICE VISIT (OUTPATIENT)
Dept: FAMILY MEDICINE CLINIC | Facility: CLINIC | Age: 47
End: 2022-06-14
Payer: COMMERCIAL

## 2022-06-14 VITALS
TEMPERATURE: 97 F | SYSTOLIC BLOOD PRESSURE: 120 MMHG | HEIGHT: 74 IN | WEIGHT: 176 LBS | RESPIRATION RATE: 16 BRPM | HEART RATE: 78 BPM | BODY MASS INDEX: 22.59 KG/M2 | OXYGEN SATURATION: 98 % | DIASTOLIC BLOOD PRESSURE: 70 MMHG

## 2022-06-14 DIAGNOSIS — K59.09 CHRONIC CONSTIPATION: ICD-10-CM

## 2022-06-14 DIAGNOSIS — M62.830 SPASM OF BACK MUSCLES: Primary | ICD-10-CM

## 2022-06-14 PROCEDURE — 99214 OFFICE O/P EST MOD 30 MIN: CPT | Performed by: FAMILY MEDICINE

## 2022-06-14 PROCEDURE — 3074F SYST BP LT 130 MM HG: CPT | Performed by: FAMILY MEDICINE

## 2022-06-14 PROCEDURE — 3078F DIAST BP <80 MM HG: CPT | Performed by: FAMILY MEDICINE

## 2022-06-14 PROCEDURE — 3008F BODY MASS INDEX DOCD: CPT | Performed by: FAMILY MEDICINE

## 2022-06-14 RX ORDER — CYCLOBENZAPRINE HCL 10 MG
10 TABLET ORAL 3 TIMES DAILY
Qty: 30 TABLET | Refills: 1 | Status: SHIPPED | OUTPATIENT
Start: 2022-06-14 | End: 2022-07-04

## 2022-06-14 RX ORDER — LINACLOTIDE 72 UG/1
1 CAPSULE, GELATIN COATED ORAL DAILY
Qty: 90 CAPSULE | Refills: 1 | Status: SHIPPED | OUTPATIENT
Start: 2022-06-14 | End: 2022-07-14

## 2022-06-15 DIAGNOSIS — G40.909 SEIZURE DISORDER (HCC): Primary | ICD-10-CM

## 2022-06-20 RX ORDER — LEVETIRACETAM 750 MG/1
TABLET ORAL
Qty: 60 TABLET | Refills: 1 | Status: SHIPPED | OUTPATIENT
Start: 2022-06-20

## 2022-06-22 NOTE — TELEPHONE ENCOUNTER
Randy Brown MD 1 minute ago (1:10 PM)     CK      I have felt great with the change reduced dosage of Keppra. No sign of seizures. I am do for refill. Can we lower to 500mg? Dosage of Keppra reduced to 750 mg BID (down from 1000 mg BID) on 5/24/2022. Routed to provider for input.

## 2022-06-27 NOTE — TELEPHONE ENCOUNTER
Informed pt via MyChart that he can decrease to 500 mg Keppra. Pended RX for provider to review. Routed to provider to order correct type of Keppra either regular or ER.

## 2022-06-28 RX ORDER — LEVETIRACETAM 500 MG/1
500 TABLET ORAL 2 TIMES DAILY
Qty: 60 TABLET | Refills: 1 | Status: SHIPPED | OUTPATIENT
Start: 2022-06-28

## 2022-06-29 DIAGNOSIS — G40.909 SEIZURE DISORDER (HCC): ICD-10-CM

## 2022-06-29 RX ORDER — LEVETIRACETAM 750 MG/1
750 TABLET ORAL 2 TIMES DAILY
Qty: 180 TABLET | Refills: 0 | Status: SHIPPED | OUTPATIENT
Start: 2022-06-29

## 2022-07-07 ENCOUNTER — OFFICE VISIT (OUTPATIENT)
Dept: NEUROLOGY | Facility: CLINIC | Age: 47
End: 2022-07-07
Payer: COMMERCIAL

## 2022-07-07 VITALS
WEIGHT: 172 LBS | SYSTOLIC BLOOD PRESSURE: 118 MMHG | BODY MASS INDEX: 22 KG/M2 | HEART RATE: 94 BPM | OXYGEN SATURATION: 99 % | RESPIRATION RATE: 16 BRPM | DIASTOLIC BLOOD PRESSURE: 64 MMHG

## 2022-07-07 DIAGNOSIS — R20.2 PARESTHESIAS: Primary | ICD-10-CM

## 2022-07-07 DIAGNOSIS — G40.909 SEIZURE DISORDER (HCC): ICD-10-CM

## 2022-07-07 PROCEDURE — 99214 OFFICE O/P EST MOD 30 MIN: CPT | Performed by: HOSPITALIST

## 2022-07-07 PROCEDURE — 3074F SYST BP LT 130 MM HG: CPT | Performed by: HOSPITALIST

## 2022-07-07 PROCEDURE — 3078F DIAST BP <80 MM HG: CPT | Performed by: HOSPITALIST

## 2022-07-07 RX ORDER — LEVETIRACETAM 250 MG/1
250 TABLET ORAL 2 TIMES DAILY
Qty: 120 TABLET | Refills: 5 | Status: SHIPPED | OUTPATIENT
Start: 2022-07-07

## 2022-07-26 ENCOUNTER — PATIENT MESSAGE (OUTPATIENT)
Dept: FAMILY MEDICINE CLINIC | Facility: CLINIC | Age: 47
End: 2022-07-26

## 2022-07-26 RX ORDER — CLINDAMYCIN HYDROCHLORIDE 300 MG/1
300 CAPSULE ORAL 3 TIMES DAILY
Qty: 21 CAPSULE | Refills: 0 | Status: SHIPPED | OUTPATIENT
Start: 2022-07-26 | End: 2022-08-02

## 2022-07-26 NOTE — TELEPHONE ENCOUNTER
From: Caralee Dance  To: Cindi Caballero MD  Sent: 7/26/2022 5:50 AM CDT  Subject: Abscess     I have multiple. On both hands and both feet. In pubic area and on gluteal cleft. It hurts to sit and be in bed. I was given clindamycin last year by a doctor that is on vacation currently. Let me know if able to prescribe or can snark me in. We are scheduled for video visit Wednesday evening but was hoping to relieve discomfort.

## 2022-07-27 ENCOUNTER — TELEMEDICINE (OUTPATIENT)
Dept: FAMILY MEDICINE CLINIC | Facility: CLINIC | Age: 47
End: 2022-07-27

## 2022-07-27 DIAGNOSIS — L98.9 SKIN LESION: Primary | ICD-10-CM

## 2022-07-27 DIAGNOSIS — K59.09 CHRONIC CONSTIPATION: ICD-10-CM

## 2022-07-27 PROCEDURE — 99214 OFFICE O/P EST MOD 30 MIN: CPT | Performed by: FAMILY MEDICINE

## 2022-08-10 ENCOUNTER — TELEPHONE (OUTPATIENT)
Dept: FAMILY MEDICINE CLINIC | Facility: CLINIC | Age: 47
End: 2022-08-10

## 2022-08-10 DIAGNOSIS — Z00.00 LABORATORY EXAMINATION ORDERED AS PART OF A ROUTINE GENERAL MEDICAL EXAMINATION: Primary | ICD-10-CM

## 2022-08-11 ENCOUNTER — OFFICE VISIT (OUTPATIENT)
Dept: NEUROLOGY | Facility: CLINIC | Age: 47
End: 2022-08-11
Payer: COMMERCIAL

## 2022-08-11 VITALS
WEIGHT: 168 LBS | SYSTOLIC BLOOD PRESSURE: 110 MMHG | BODY MASS INDEX: 21.56 KG/M2 | DIASTOLIC BLOOD PRESSURE: 70 MMHG | HEART RATE: 74 BPM | RESPIRATION RATE: 16 BRPM | HEIGHT: 74 IN | OXYGEN SATURATION: 98 %

## 2022-08-11 DIAGNOSIS — G40.909 SEIZURE DISORDER (HCC): Primary | ICD-10-CM

## 2022-08-11 PROCEDURE — 99214 OFFICE O/P EST MOD 30 MIN: CPT | Performed by: HOSPITALIST

## 2022-08-11 PROCEDURE — 3074F SYST BP LT 130 MM HG: CPT | Performed by: HOSPITALIST

## 2022-08-11 PROCEDURE — 3078F DIAST BP <80 MM HG: CPT | Performed by: HOSPITALIST

## 2022-08-11 PROCEDURE — 3008F BODY MASS INDEX DOCD: CPT | Performed by: HOSPITALIST

## 2022-08-20 LAB
ABSOLUTE BASOPHILS: 39 CELLS/UL (ref 0–200)
ABSOLUTE EOSINOPHILS: 618 CELLS/UL (ref 15–500)
ABSOLUTE LYMPHOCYTES: 1937 CELLS/UL (ref 850–3900)
ABSOLUTE MONOCYTES: 546 CELLS/UL (ref 200–950)
ABSOLUTE NEUTROPHILS: 3361 CELLS/UL (ref 1500–7800)
ALBUMIN/GLOBULIN RATIO: 1.5 (CALC) (ref 1–2.5)
ALBUMIN: 4.4 G/DL (ref 3.6–5.1)
ALKALINE PHOSPHATASE: 62 U/L (ref 36–130)
ALT: 30 U/L (ref 9–46)
AST: 25 U/L (ref 10–40)
BASOPHILS: 0.6 %
BILIRUBIN, TOTAL: 0.6 MG/DL (ref 0.2–1.2)
BUN/CREATININE RATIO: 39 (CALC) (ref 6–22)
BUN: 33 MG/DL (ref 7–25)
CALCIUM: 9.6 MG/DL (ref 8.6–10.3)
CARBON DIOXIDE: 29 MMOL/L (ref 20–32)
CHLORIDE: 105 MMOL/L (ref 98–110)
CHOL/HDLC RATIO: 2.8 (CALC)
CHOLESTEROL, TOTAL: 175 MG/DL
CREATININE: 0.85 MG/DL (ref 0.6–1.29)
EGFR: 108 ML/MIN/1.73M2
EOSINOPHILS: 9.5 %
GLOBULIN: 2.9 G/DL (CALC) (ref 1.9–3.7)
GLUCOSE: 90 MG/DL (ref 65–99)
HDL CHOLESTEROL: 63 MG/DL
HEMATOCRIT: 39.2 % (ref 38.5–50)
HEMOGLOBIN: 13 G/DL (ref 13.2–17.1)
LDL-CHOLESTEROL: 98 MG/DL (CALC)
LYMPHOCYTES: 29.8 %
MCH: 31.9 PG (ref 27–33)
MCHC: 33.2 G/DL (ref 32–36)
MCV: 96.1 FL (ref 80–100)
MONOCYTES: 8.4 %
MPV: 10.5 FL (ref 7.5–12.5)
NEUTROPHILS: 51.7 %
NON-HDL CHOLESTEROL: 112 MG/DL (CALC)
PLATELET COUNT: 284 THOUSAND/UL (ref 140–400)
POTASSIUM: 4.6 MMOL/L (ref 3.5–5.3)
PROTEIN, TOTAL: 7.3 G/DL (ref 6.1–8.1)
RDW: 11.8 % (ref 11–15)
RED BLOOD CELL COUNT: 4.08 MILLION/UL (ref 4.2–5.8)
SODIUM: 139 MMOL/L (ref 135–146)
TRIGLYCERIDES: 49 MG/DL
TSH W/REFLEX TO FT4: 0.91 MIU/L (ref 0.4–4.5)
WHITE BLOOD CELL COUNT: 6.5 THOUSAND/UL (ref 3.8–10.8)

## 2022-08-22 ENCOUNTER — TELEPHONE (OUTPATIENT)
Dept: SURGERY | Facility: CLINIC | Age: 47
End: 2022-08-22

## 2022-08-22 NOTE — TELEPHONE ENCOUNTER
Rec'd written report from MRI Brain, completed at [address] on 8/18/2022. Endorsed to nurse's bin for provider review.

## 2022-08-22 NOTE — TELEPHONE ENCOUNTER
Imaging report received by CMA, endorsed to provider for review. Will be sent to scanning once received back from provider.

## 2022-08-26 ENCOUNTER — TELEPHONE (OUTPATIENT)
Dept: NEUROLOGY | Facility: CLINIC | Age: 47
End: 2022-08-26

## 2022-08-26 NOTE — TELEPHONE ENCOUNTER
pt brought in disc from [address] of MRI brain; uploaded to PACS; disc returned to pt

## 2022-08-29 ENCOUNTER — PATIENT MESSAGE (OUTPATIENT)
Dept: FAMILY MEDICINE CLINIC | Facility: CLINIC | Age: 47
End: 2022-08-29

## 2022-08-30 NOTE — TELEPHONE ENCOUNTER
Per radiology report on providers desk, everything appears stable.   Please advise that Bradly Vázquez PA-C is out of office today and will return later this week to review and determine further interval imaging

## 2022-08-30 NOTE — TELEPHONE ENCOUNTER
Pt call message noted below. Re-routed to providers for review and feedback regarding imaging results.

## 2022-08-30 NOTE — TELEPHONE ENCOUNTER
Note from provider acknowledged below. Sent pt a [facility] message informing that provider is currently out of the office and will provide feedback regarding imaging when he returns.

## 2022-08-31 ENCOUNTER — OFFICE VISIT (OUTPATIENT)
Dept: FAMILY MEDICINE CLINIC | Facility: CLINIC | Age: 47
End: 2022-08-31
Payer: COMMERCIAL

## 2022-08-31 VITALS
RESPIRATION RATE: 16 BRPM | BODY MASS INDEX: 22.07 KG/M2 | WEIGHT: 172 LBS | HEART RATE: 76 BPM | HEIGHT: 74 IN | TEMPERATURE: 97 F | DIASTOLIC BLOOD PRESSURE: 68 MMHG | SYSTOLIC BLOOD PRESSURE: 100 MMHG | OXYGEN SATURATION: 98 %

## 2022-08-31 DIAGNOSIS — N49.2 CELLULITIS OF SCROTUM: Primary | ICD-10-CM

## 2022-08-31 PROCEDURE — 99213 OFFICE O/P EST LOW 20 MIN: CPT | Performed by: FAMILY MEDICINE

## 2022-08-31 PROCEDURE — 3008F BODY MASS INDEX DOCD: CPT | Performed by: FAMILY MEDICINE

## 2022-08-31 PROCEDURE — 3078F DIAST BP <80 MM HG: CPT | Performed by: FAMILY MEDICINE

## 2022-08-31 PROCEDURE — 3074F SYST BP LT 130 MM HG: CPT | Performed by: FAMILY MEDICINE

## 2022-08-31 RX ORDER — DOXYCYCLINE HYCLATE 100 MG
100 TABLET ORAL 2 TIMES DAILY
Qty: 20 TABLET | Refills: 0 | Status: SHIPPED | OUTPATIENT
Start: 2022-08-31 | End: 2022-09-10

## 2022-08-31 RX ORDER — LINACLOTIDE 72 UG/1
72 CAPSULE, GELATIN COATED ORAL DAILY
COMMUNITY
Start: 2022-08-23 | End: 2022-09-22

## 2022-09-02 NOTE — TELEPHONE ENCOUNTER
Pt will need a refill on the Gabapentin 100 mg 3 times per day. Neurologist used to fill but has since left [facility], will be seeing another one in November. Pt is completely out of the medication. Please send to local [facility] pharmacy. Pt states that was mentioned at the end of his visit last week.

## 2022-09-05 RX ORDER — GABAPENTIN 100 MG/1
100 CAPSULE ORAL 3 TIMES DAILY
Qty: 90 CAPSULE | Refills: 0 | Status: SHIPPED | OUTPATIENT
Start: 2022-09-05

## 2022-09-06 ENCOUNTER — TELEPHONE (OUTPATIENT)
Dept: NEUROLOGY | Facility: CLINIC | Age: 47
End: 2022-09-06

## 2022-09-16 ENCOUNTER — OFFICE VISIT (OUTPATIENT)
Facility: LOCATION | Age: 47
End: 2022-09-16
Payer: COMMERCIAL

## 2022-09-16 VITALS — TEMPERATURE: 98 F | HEART RATE: 75 BPM

## 2022-09-16 DIAGNOSIS — L03.315 CELLULITIS OF PERINEUM: Primary | ICD-10-CM

## 2022-09-16 PROCEDURE — 99204 OFFICE O/P NEW MOD 45 MIN: CPT | Performed by: SURGERY

## 2022-09-17 ENCOUNTER — PATIENT MESSAGE (OUTPATIENT)
Dept: FAMILY MEDICINE CLINIC | Facility: CLINIC | Age: 47
End: 2022-09-17

## 2022-09-19 ENCOUNTER — PATIENT MESSAGE (OUTPATIENT)
Dept: SURGERY | Facility: CLINIC | Age: 47
End: 2022-09-19

## 2022-09-19 ENCOUNTER — HOSPITAL ENCOUNTER (OUTPATIENT)
Dept: MRI IMAGING | Facility: HOSPITAL | Age: 47
Discharge: HOME | End: 2022-09-19
Attending: Other

## 2022-09-19 DIAGNOSIS — G95.9 MYELOPATHY (HCC): ICD-10-CM

## 2022-09-19 DIAGNOSIS — I63.9 CEREBROVASCULAR ACCIDENT (CVA), UNSPECIFIED MECHANISM (HCC): ICD-10-CM

## 2022-09-19 PROCEDURE — A9575 INJ GADOTERATE MEGLUMI 0.1ML: HCPCS | Performed by: RADIOLOGY

## 2022-09-19 PROCEDURE — 72159 MR ANGIO SPINE W/O&W/DYE: CPT | Performed by: OTHER

## 2022-09-19 NOTE — TELEPHONE ENCOUNTER
Patient has hx of benign ganglioglioma. He completed an MRA of the spinal canal today, ordered by Dr Edna Irene. He was last seen on 9-21-21 by Lazarus Sauers. Per OV note:  Mayra Skinner is doing well in regards to surgery but is unfortunately suffering from symptoms of AED toxicity. MRI Brain w/ and w/o in 6 months. F/u at that time. \"    Patient completed MRI in February and was told to f/u in one year. Please advise.

## 2022-09-19 NOTE — TELEPHONE ENCOUNTER
From: Nilda Inman  To: Anderson Collet, PA  Sent: 9/19/2022 3:11 PM CDT  Subject: MRA    Just completed test there. Will you or someone on your team take a look at it?

## 2022-09-19 NOTE — TELEPHONE ENCOUNTER
From: Truong Bryant  To: Patrick Woodward MD  Sent: 9/17/2022 8:08 AM CDT  Subject: Aluminum chloride     I need a refill of this for underarm sweating if possible. It was prescribed by my former PCP.

## 2022-09-20 NOTE — TELEPHONE ENCOUNTER
Please advise provider is out of office this week. I will route to [facility] for review and to determine next steps.

## 2022-09-22 ENCOUNTER — TELEPHONE (OUTPATIENT)
Dept: SURGERY | Facility: CLINIC | Age: 47
End: 2022-09-22

## 2022-09-22 ENCOUNTER — TELEPHONE (OUTPATIENT)
Dept: FAMILY MEDICINE CLINIC | Facility: CLINIC | Age: 47
End: 2022-09-22

## 2022-09-22 RX ORDER — GABAPENTIN 100 MG/1
200 CAPSULE ORAL 3 TIMES DAILY
Qty: 180 CAPSULE | Refills: 1 | Status: SHIPPED | OUTPATIENT
Start: 2022-09-22

## 2022-09-22 NOTE — TELEPHONE ENCOUNTER
Patient states a few days ago he started getting tingling in his nostrils. He describes it like an electric shock and it's gotten worse. He can't use his electric razor or electric toothbrush because it hurts. Doesn't know what to do, no openings, would like to speak to a nurse, please advise. Does not want to wait until Monday.     Future Appointments   Date Time Provider Sanjiv Tejada   9/26/2022 11:30 AM Albert Spencer MD [facility] [address]   10/18/2022  9:00 AM [facility] [address] [facility]   11/30/2022 10:00 AM Te Ashford MD EN[facility] [address]

## 2022-09-22 NOTE — TELEPHONE ENCOUNTER
Pt calling stating he has been experiencing burning and tingling pain in right nostril and today he is feeling like an electric shock is happening to him.   Please advise

## 2022-09-22 NOTE — TELEPHONE ENCOUNTER
Sounds like a nerve pain possible trigeminal neuralgia. He is on Gabapentin and increase dose to 200 mg TID as tolerated. Had MRI brain in Feb 2022 and that look fine. May need to repeat depending upon his progress/symptoms. Patient advised and verbalized understanding. Patient will increase dose.

## 2022-09-22 NOTE — TELEPHONE ENCOUNTER
Patient states he has had electric shock in his right nostril. This started on Saturday, worsened on Sunday after exercise. Continued until today. Patient states it hurt to use an electric toothbrush and did not even try his electric razor. New symptom for patient.  Confirmed patient is currently taking his keppra, gabapentin and linzess

## 2022-09-22 NOTE — TELEPHONE ENCOUNTER
It sounds like it may be neuropathic in nature. Actually it may be best for child to follow-up with neurologist.  I will definitely see him on his next visit.

## 2022-09-22 NOTE — TELEPHONE ENCOUNTER
Pt states he starting getting pain in his nose on Saturday \"almost like a vibrating sensation. \" Pt states the pain has continued and this morning he was unable to use his electric toothbrush or electric razor due to the pain. Pt states he had to turn off the toothbrush and manually brush his teeth and did not shave his face, this is the first day he has experienced this. Pt states yesterday he noticed pain with chewing and then this morning with the toothbrush and razor, pt states \"even on my drive in to work this morning I was experiencing some zings. \" Pt states he has been dealing with neuropathy for a couple of years but he does not know what this symptom is related to. Pt states he has an appointment for AWV with Dr. Rebeca Macario on Monday and \"I was just going to deal with it but I see all these specialists and they said I should reach out to my primary to let him know what is going on. \" Offered pt OV with Dr. Angelica Morris tomorrow to address nose pain, pt declined at this time asking for Dr. Makayla Dong recommendations, \"if he wants me to see the other doctor tomorrow I will but I'd like to know what he has to say. \" Advised pt this information would be given to Dr. Rebeca Macario and he will be called back with any recommendations.

## 2022-09-23 DIAGNOSIS — R19.09 GROIN MASS: Primary | ICD-10-CM

## 2022-09-26 ENCOUNTER — OFFICE VISIT (OUTPATIENT)
Dept: FAMILY MEDICINE CLINIC | Facility: CLINIC | Age: 47
End: 2022-09-26

## 2022-09-26 VITALS
RESPIRATION RATE: 16 BRPM | SYSTOLIC BLOOD PRESSURE: 118 MMHG | OXYGEN SATURATION: 98 % | HEART RATE: 88 BPM | TEMPERATURE: 97 F | HEIGHT: 74 IN | DIASTOLIC BLOOD PRESSURE: 70 MMHG | WEIGHT: 172 LBS | BODY MASS INDEX: 22.07 KG/M2

## 2022-09-26 DIAGNOSIS — Z00.00 WELLNESS EXAMINATION: Primary | ICD-10-CM

## 2022-09-26 DIAGNOSIS — G62.9 NEUROPATHY: ICD-10-CM

## 2022-09-26 PROCEDURE — 99396 PREV VISIT EST AGE 40-64: CPT | Performed by: FAMILY MEDICINE

## 2022-09-26 PROCEDURE — 3008F BODY MASS INDEX DOCD: CPT | Performed by: FAMILY MEDICINE

## 2022-09-26 PROCEDURE — 3074F SYST BP LT 130 MM HG: CPT | Performed by: FAMILY MEDICINE

## 2022-09-26 PROCEDURE — 3078F DIAST BP <80 MM HG: CPT | Performed by: FAMILY MEDICINE

## 2022-09-26 RX ORDER — CYCLOBENZAPRINE HCL 10 MG
10 TABLET ORAL 3 TIMES DAILY
Qty: 30 TABLET | Refills: 1 | Status: SHIPPED | OUTPATIENT
Start: 2022-09-26 | End: 2022-10-16

## 2022-10-03 NOTE — TELEPHONE ENCOUNTER
Noted that patient has messaged with an update, addressing note to CHUY Burgos, [address]. Message routed to provider.

## 2022-10-07 ENCOUNTER — PATIENT MESSAGE (OUTPATIENT)
Dept: FAMILY MEDICINE CLINIC | Facility: CLINIC | Age: 47
End: 2022-10-07

## 2022-10-07 NOTE — TELEPHONE ENCOUNTER
From: Lalita Ca  To:  Martin Larkin MD  Sent: 10/7/2022 8:42 AM CDT  Subject: Call    I visited [facility] and would like to discuss with you if.

## 2022-10-18 ENCOUNTER — TELEPHONE (OUTPATIENT)
Dept: FAMILY MEDICINE CLINIC | Facility: CLINIC | Age: 47
End: 2022-10-18

## 2022-10-18 ENCOUNTER — NURSE ONLY (OUTPATIENT)
Dept: ELECTROPHYSIOLOGY | Facility: HOSPITAL | Age: 47
End: 2022-10-18
Attending: HOSPITALIST
Payer: COMMERCIAL

## 2022-10-18 ENCOUNTER — PROCEDURE VISIT (OUTPATIENT)
Dept: NEUROLOGY | Facility: CLINIC | Age: 47
End: 2022-10-18

## 2022-10-18 DIAGNOSIS — G40.909 SEIZURE DISORDER (HCC): ICD-10-CM

## 2022-10-18 DIAGNOSIS — G40.909 SEIZURE DISORDER (HCC): Primary | ICD-10-CM

## 2022-10-18 DIAGNOSIS — R19.09 GROIN MASS: Primary | ICD-10-CM

## 2022-10-18 PROCEDURE — 95816 EEG AWAKE AND DROWSY: CPT

## 2022-10-18 PROCEDURE — 95816 EEG AWAKE AND DROWSY: CPT | Performed by: OTHER

## 2022-10-18 NOTE — TELEPHONE ENCOUNTER
Patient came into the office and dropped off [facility] physician results form to be completed. Call when ready, fax number on the form. Fee ticket and flowsheet attached and placed in MA's folder.

## 2022-10-19 NOTE — TELEPHONE ENCOUNTER
Spoke to patient, will  today, can't fax because he didn't sign it. No charge, placed in front  cabinet, copy made for scanning.

## 2022-10-20 ENCOUNTER — OFFICE VISIT (OUTPATIENT)
Facility: LOCATION | Age: 47
End: 2022-10-20
Payer: COMMERCIAL

## 2022-10-20 VITALS — TEMPERATURE: 98 F | HEART RATE: 70 BPM

## 2022-10-20 DIAGNOSIS — R19.09 INGUINAL MASS: Primary | ICD-10-CM

## 2022-10-20 DIAGNOSIS — L03.315 CELLULITIS OF PERINEUM: ICD-10-CM

## 2022-10-20 PROCEDURE — 99213 OFFICE O/P EST LOW 20 MIN: CPT | Performed by: SURGERY

## 2022-10-24 ENCOUNTER — TELEPHONE (OUTPATIENT)
Dept: NEUROLOGY | Facility: CLINIC | Age: 47
End: 2022-10-24

## 2022-10-24 NOTE — TELEPHONE ENCOUNTER
Praveen Arriaga MD  [facility] Nurse    EEG same as last time, shows some nonspecific sharp waves in the frontal area. Will discuss EEG and plan on follow up visit in Nov. For now continue Keppra at same dose.  Thanks   Future Appointments   Date Time Provider Sanjiv Mallory   11/9/2022  8:45 AM Sarah Moreno DO [facility] Estrada   11/30/2022 10:00 AM Praveen Arriaga MD [facility] [place]

## 2022-10-31 ENCOUNTER — OFFICE VISIT (OUTPATIENT)
Facility: LOCATION | Age: 47
End: 2022-10-31
Payer: COMMERCIAL

## 2022-10-31 DIAGNOSIS — H60.12 CELLULITIS OF AURICLE OF LEFT EAR: Primary | ICD-10-CM

## 2022-10-31 PROCEDURE — 99203 OFFICE O/P NEW LOW 30 MIN: CPT | Performed by: OTOLARYNGOLOGY

## 2022-10-31 RX ORDER — SULFAMETHOXAZOLE AND TRIMETHOPRIM 800; 160 MG/1; MG/1
1 TABLET ORAL 2 TIMES DAILY
Qty: 20 TABLET | Refills: 0 | Status: SHIPPED | OUTPATIENT
Start: 2022-10-31

## 2022-11-02 DIAGNOSIS — R19.09 LEFT GROIN MASS: Primary | ICD-10-CM

## 2022-11-09 ENCOUNTER — OFFICE VISIT (OUTPATIENT)
Dept: PHYSICAL MEDICINE AND REHAB | Facility: CLINIC | Age: 47
End: 2022-11-09
Payer: COMMERCIAL

## 2022-11-09 VITALS
OXYGEN SATURATION: 98 % | DIASTOLIC BLOOD PRESSURE: 72 MMHG | HEIGHT: 74 IN | BODY MASS INDEX: 22.46 KG/M2 | SYSTOLIC BLOOD PRESSURE: 110 MMHG | HEART RATE: 84 BPM | WEIGHT: 175 LBS

## 2022-11-09 DIAGNOSIS — G62.9 NEUROPATHY: ICD-10-CM

## 2022-11-09 DIAGNOSIS — M25.562 CHRONIC PAIN OF BOTH KNEES: Primary | ICD-10-CM

## 2022-11-09 DIAGNOSIS — M25.561 CHRONIC PAIN OF BOTH KNEES: Primary | ICD-10-CM

## 2022-11-09 DIAGNOSIS — M25.611 DECREASED RANGE OF MOTION OF RIGHT SHOULDER: ICD-10-CM

## 2022-11-09 DIAGNOSIS — G89.29 CHRONIC PAIN OF BOTH KNEES: Primary | ICD-10-CM

## 2022-11-09 PROCEDURE — 3008F BODY MASS INDEX DOCD: CPT | Performed by: PHYSICAL MEDICINE & REHABILITATION

## 2022-11-09 PROCEDURE — 3078F DIAST BP <80 MM HG: CPT | Performed by: PHYSICAL MEDICINE & REHABILITATION

## 2022-11-09 PROCEDURE — 3074F SYST BP LT 130 MM HG: CPT | Performed by: PHYSICAL MEDICINE & REHABILITATION

## 2022-11-09 PROCEDURE — 99214 OFFICE O/P EST MOD 30 MIN: CPT | Performed by: PHYSICAL MEDICINE & REHABILITATION

## 2022-11-09 RX ORDER — DULOXETIN HYDROCHLORIDE 20 MG/1
20 CAPSULE, DELAYED RELEASE ORAL DAILY
Qty: 30 CAPSULE | Refills: 2 | Status: SHIPPED | OUTPATIENT
Start: 2022-11-09

## 2022-11-09 NOTE — PATIENT INSTRUCTIONS
Continue the duloxetine for at least 6 weeks if it does not give you side effects and seemingly no benefit. Let your neurologist know you were started on this medication. Might be helpful to consider pain psychology, perhaps after the neuropsychiatric testing is done.

## 2022-11-10 ENCOUNTER — PATIENT MESSAGE (OUTPATIENT)
Dept: FAMILY MEDICINE CLINIC | Facility: CLINIC | Age: 47
End: 2022-11-10

## 2022-11-10 NOTE — TELEPHONE ENCOUNTER
Please hold any medications that he may be taking for constipation. Recommend taking Imodium 1 tablet 3 times a day as needed.

## 2022-11-14 ENCOUNTER — TELEPHONE (OUTPATIENT)
Dept: PHYSICAL MEDICINE AND REHAB | Facility: CLINIC | Age: 47
End: 2022-11-14

## 2022-11-14 ENCOUNTER — MED REC SCAN ONLY (OUTPATIENT)
Dept: PHYSICAL MEDICINE AND REHAB | Facility: CLINIC | Age: 47
End: 2022-11-14

## 2022-11-20 DIAGNOSIS — E78.5 HYPERLIPIDEMIA, UNSPECIFIED: ICD-10-CM

## 2022-11-21 ENCOUNTER — PATIENT MESSAGE (OUTPATIENT)
Dept: PHYSICAL MEDICINE AND REHAB | Facility: CLINIC | Age: 47
End: 2022-11-21

## 2022-11-21 RX ORDER — ATORVASTATIN CALCIUM 20 MG/1
TABLET, FILM COATED ORAL
Qty: 90 TABLET | Refills: 1 | Status: SHIPPED | OUTPATIENT
Start: 2022-11-21

## 2022-11-21 NOTE — TELEPHONE ENCOUNTER
From: Karolyn Aldridge  To: Javi Dennis DO  Sent: 11/21/2022 9:48 AM CST  Subject: X-ray     I had knees about a week ago. Did you receive the results? They were done at [address].

## 2022-11-21 NOTE — TELEPHONE ENCOUNTER
See TE 11/14/22-Pt called to get results for xray and would like a note to travel and have access to a wheelchair- please send letter thru my chart.

## 2022-11-22 NOTE — TELEPHONE ENCOUNTER
XR report reviewed; minimal osteoarthritic changes in both knees. Will type up travel accomodation letter. If he cannot find the letter in [employer] have him message us back for assistance.

## 2022-11-30 ENCOUNTER — OFFICE VISIT (OUTPATIENT)
Dept: NEUROLOGY | Facility: CLINIC | Age: 47
End: 2022-11-30
Payer: COMMERCIAL

## 2022-11-30 VITALS
SYSTOLIC BLOOD PRESSURE: 112 MMHG | RESPIRATION RATE: 16 BRPM | BODY MASS INDEX: 23 KG/M2 | HEART RATE: 76 BPM | DIASTOLIC BLOOD PRESSURE: 78 MMHG | WEIGHT: 180 LBS

## 2022-11-30 DIAGNOSIS — R20.0 BILATERAL NUMBNESS AND TINGLING OF ARMS AND LEGS: ICD-10-CM

## 2022-11-30 DIAGNOSIS — Z98.890 S/P CRANIOTOMY: ICD-10-CM

## 2022-11-30 DIAGNOSIS — R51.9 FACIAL PAIN: ICD-10-CM

## 2022-11-30 DIAGNOSIS — G40.909 SEIZURE DISORDER (HCC): Primary | ICD-10-CM

## 2022-11-30 DIAGNOSIS — R20.2 BILATERAL NUMBNESS AND TINGLING OF ARMS AND LEGS: ICD-10-CM

## 2022-11-30 DIAGNOSIS — R20.2 PARESTHESIAS: ICD-10-CM

## 2022-11-30 PROCEDURE — 3074F SYST BP LT 130 MM HG: CPT | Performed by: OTHER

## 2022-11-30 PROCEDURE — 3078F DIAST BP <80 MM HG: CPT | Performed by: OTHER

## 2022-11-30 PROCEDURE — 99214 OFFICE O/P EST MOD 30 MIN: CPT | Performed by: OTHER

## 2022-11-30 RX ORDER — LINACLOTIDE 72 UG/1
1 CAPSULE, GELATIN COATED ORAL AS DIRECTED
COMMUNITY
Start: 2022-10-06 | End: 2022-11-30

## 2022-11-30 RX ORDER — CYCLOBENZAPRINE HCL 10 MG
1 TABLET ORAL AS DIRECTED
COMMUNITY
Start: 2022-10-04

## 2022-11-30 RX ORDER — GABAPENTIN 400 MG/1
400 CAPSULE ORAL 2 TIMES DAILY
Qty: 60 CAPSULE | Refills: 5 | Status: SHIPPED | OUTPATIENT
Start: 2022-11-30

## 2022-11-30 NOTE — PROGRESS NOTES
Zonia rizos a loss of coordination of legs, spine pain ( feels like it is bruised), Recently dx'd with Myomalacia and Cerebral Atrophy. Past Hx brain tumor and blood clot in brain. Denies any seizures.

## 2022-12-01 ENCOUNTER — MED REC SCAN ONLY (OUTPATIENT)
Dept: ORTHOPEDICS CLINIC | Facility: CLINIC | Age: 47
End: 2022-12-01

## 2022-12-01 ENCOUNTER — TELEPHONE (OUTPATIENT)
Dept: ORTHOPEDICS CLINIC | Facility: CLINIC | Age: 47
End: 2022-12-01

## 2022-12-01 DIAGNOSIS — M79.671 RIGHT FOOT PAIN: Primary | ICD-10-CM

## 2022-12-01 NOTE — TELEPHONE ENCOUNTER
Patient is scheduled with Dr. Ashley Mcbride for right foot pain. Please advise if imaging is needed.

## 2022-12-16 ENCOUNTER — OFFICE VISIT (OUTPATIENT)
Dept: ORTHOPEDICS CLINIC | Facility: CLINIC | Age: 47
End: 2022-12-16
Payer: COMMERCIAL

## 2022-12-16 DIAGNOSIS — M79.671 RIGHT FOOT PAIN: Primary | ICD-10-CM

## 2022-12-16 DIAGNOSIS — M92.62 HAGLUND'S DEFORMITY OF BOTH HEELS: ICD-10-CM

## 2022-12-16 DIAGNOSIS — G95.9 MYELOPATHY (HCC): ICD-10-CM

## 2022-12-16 DIAGNOSIS — M79.673 FOOT ARCH PAIN, UNSPECIFIED LATERALITY: ICD-10-CM

## 2022-12-16 DIAGNOSIS — M92.61 HAGLUND'S DEFORMITY OF BOTH HEELS: ICD-10-CM

## 2022-12-16 DIAGNOSIS — M51.37 DEGENERATION OF LUMBAR OR LUMBOSACRAL INTERVERTEBRAL DISC: ICD-10-CM

## 2022-12-16 DIAGNOSIS — M77.9 TENDONITIS: ICD-10-CM

## 2022-12-16 PROCEDURE — 99213 OFFICE O/P EST LOW 20 MIN: CPT | Performed by: PODIATRIST

## 2022-12-20 DIAGNOSIS — G89.29 CHRONIC PAIN OF BOTH KNEES: ICD-10-CM

## 2022-12-20 DIAGNOSIS — M25.611 DECREASED RANGE OF MOTION OF RIGHT SHOULDER: ICD-10-CM

## 2022-12-20 DIAGNOSIS — G62.9 NEUROPATHY: ICD-10-CM

## 2022-12-20 DIAGNOSIS — M25.562 CHRONIC PAIN OF BOTH KNEES: ICD-10-CM

## 2022-12-20 DIAGNOSIS — M25.561 CHRONIC PAIN OF BOTH KNEES: ICD-10-CM

## 2022-12-21 RX ORDER — DULOXETIN HYDROCHLORIDE 20 MG/1
20 CAPSULE, DELAYED RELEASE ORAL DAILY
Qty: 90 CAPSULE | Refills: 0 | OUTPATIENT
Start: 2022-12-21

## 2022-12-21 NOTE — TELEPHONE ENCOUNTER
Refill Request    Medication request: DULoxetine 20 MG Oral Cap DR Particles  Take 1 capsule (20 mg total) by mouth in the morning. LOV:11/9/2022 Erlinda Mcintyre,    Due back to clinic per last office note:  \"F/u in 2-3 months\"  NOV: Visit date not found      [place]: 11/09/22 #90    Urine drug screen (if applicable): None  Pain contract: None    LOV plan (if weaning or changing medications): Per Dr. Ramón Sanchez note: \"duloxetine 20 mg daily\"      * Patient filled 90 days of duloxetine 20 mg on 11/9/22. Not approved.

## 2023-01-01 ENCOUNTER — MED REC SCAN ONLY (OUTPATIENT)
Dept: ORTHOPEDICS CLINIC | Facility: CLINIC | Age: 48
End: 2023-01-01

## 2023-01-04 ENCOUNTER — TELEMEDICINE (OUTPATIENT)
Dept: FAMILY MEDICINE CLINIC | Facility: CLINIC | Age: 48
End: 2023-01-04
Payer: COMMERCIAL

## 2023-01-04 DIAGNOSIS — R53.1 WEAKNESS: ICD-10-CM

## 2023-01-04 DIAGNOSIS — L74.9 SWEATING ABNORMALITY: Primary | ICD-10-CM

## 2023-01-04 DIAGNOSIS — M62.838 MUSCLE SPASM: ICD-10-CM

## 2023-01-04 PROCEDURE — 99214 OFFICE O/P EST MOD 30 MIN: CPT | Performed by: FAMILY MEDICINE

## 2023-01-04 RX ORDER — VENLAFAXINE HYDROCHLORIDE 37.5 MG/1
37.5 CAPSULE, EXTENDED RELEASE ORAL DAILY
Qty: 90 CAPSULE | Refills: 1 | Status: SHIPPED | OUTPATIENT
Start: 2023-01-04

## 2023-01-04 RX ORDER — CYCLOBENZAPRINE HCL 10 MG
10 TABLET ORAL NIGHTLY PRN
Qty: 30 TABLET | Refills: 1 | Status: SHIPPED | OUTPATIENT
Start: 2023-01-04

## 2023-01-04 NOTE — PATIENT INSTRUCTIONS
Thank you for choosing Juanito Mckee MD at [place] [address]  To Do: Roger Record  1. Please take meds as directed. [facility] is located in [address]. Monday, Tuesday & Friday - 8 a.m. to 4 p.m. Wednesday, Thursday - 7 a.m. to 3 p.m. The lab is closed daily from 12 p.m.-12:30 p.m. Saturday lab hours by appointment. Call [phone] to schedule the appointment. Please signup for [employer], which is electronic access to your record if you have not done so. All your results will post on there. [email or web address]   You can NOW use [employer] to book your appointments with us, or consider using open access scheduling which is through the edward website [email or web address] and type in Juanito Mckee MD and follow the links for \"Schedule Online Now\"    To schedule Imaging or tests at [facility] Scheduling [phone], Go to [facility] A ER Building (For example: CT scans, X rays, Ultrasound, MRI)  Cardiac Testing in ER building Building A second floor Cardiac Testing [phone] (For example: Holter Monitor, Cardiac Stress tests,Event Monitor, or 2D Echocardiograms)  [facility] call [phone] usually in dg A  Walk in Clinic in [place] at [facility]. [address] Mon-Fri at 8am-7:30 p.m., and Sat/Sun 9:00a. m.-4:30 p.m. Also at [address]  Call [phone] for info     Please call our office about any questions regarding your treatment/medicines/tests as a result of today's visit. For your safety, read the entire package insert of all medicines prescribed to you and be aware of all of the risks of treatment even beyond those discussed today. All therapies have potential risk of harm or side effects or medication interactions.   It is your duty and for your safety to discuss with the pharmacist and our office with questions, and to notify us and stop treatment if problems arise, but know that our intention is that the benefits outweigh those potential risks and we strive to make you healthier and to improve your quality of life. Referrals, and Radiology Information:    If your insurance requires a referral to a specialist, please allow 5 business days to process your referral request.    If Tricia Mae MD orders a CT or MRI, it may take up to 10 business days to receive approval from your insurance company. Once our office has called informing you that the insurance company approved your testing, please call Central Scheduling at [phone]  Please allow our office 5 business days to contact you regarding any testing results. Refill policies:   Allow 3 business days for refills; controlled substances may take longer and must be picked up from the office in person. Narcotic medications can only be filled in 30 day increments and must be refilled at an office visit only. If your prescription is due for a refill, you may be due for a follow-up appointment. We cannot refill your maintenance medications at a preventative wellness visit. To best provide you care, patients receiving maintenance medications need to be seen at least twice a year.

## 2023-01-05 ENCOUNTER — TELEPHONE (OUTPATIENT)
Dept: NEUROLOGY | Facility: CLINIC | Age: 48
End: 2023-01-05

## 2023-01-05 ENCOUNTER — PATIENT MESSAGE (OUTPATIENT)
Dept: PHYSICAL MEDICINE AND REHAB | Facility: CLINIC | Age: 48
End: 2023-01-05

## 2023-01-05 DIAGNOSIS — M25.562 CHRONIC PAIN OF BOTH KNEES: Primary | ICD-10-CM

## 2023-01-05 DIAGNOSIS — G89.29 CHRONIC PAIN OF BOTH KNEES: Primary | ICD-10-CM

## 2023-01-05 DIAGNOSIS — M25.561 CHRONIC PAIN OF BOTH KNEES: Primary | ICD-10-CM

## 2023-01-05 NOTE — TELEPHONE ENCOUNTER
of [facility] Report completed and signed. Patient notified and states he will p/u tomorrow. Left at  in [address].   Copy sent to scanning,

## 2023-01-07 ENCOUNTER — PATIENT MESSAGE (OUTPATIENT)
Dept: ORTHOPEDICS CLINIC | Facility: CLINIC | Age: 48
End: 2023-01-07

## 2023-01-10 NOTE — TELEPHONE ENCOUNTER
S/w patient who wanted to schedule bilateral knee injections. Instructed patient that I will pend an order to Dr. Autumn Ling for his review/signature. Once that has been signed, it will be sent to referrals for authorization. Once we have authorization to proceed, we will call patient to schedule at that time. Patient verbalized understanding and was thankful.

## 2023-01-11 ENCOUNTER — TELEPHONE (OUTPATIENT)
Dept: PHYSICAL MEDICINE AND REHAB | Facility: CLINIC | Age: 48
End: 2023-01-11

## 2023-01-11 NOTE — TELEPHONE ENCOUNTER
Initiated authorization for Bilateral knee corticosteroid injections under ultrasound guidance CPT 82008U6+ with [employer] automated line  Confirmation #[id number]  Status: Approved-authorization is not required per health plan

## 2023-01-12 ENCOUNTER — TELEPHONE (OUTPATIENT)
Dept: FAMILY MEDICINE CLINIC | Facility: CLINIC | Age: 48
End: 2023-01-12

## 2023-01-12 ENCOUNTER — PATIENT MESSAGE (OUTPATIENT)
Dept: PHYSICAL MEDICINE AND REHAB | Facility: CLINIC | Age: 48
End: 2023-01-12

## 2023-01-12 NOTE — TELEPHONE ENCOUNTER
From: Nilda Inman  Sent: 1/12/2023 11:58 AM CST  To: Dustin Ruiz  Subject: Knees xray    I missed a phone call from Srinivasa to schedule. I called back and was stuck on hold for 45 minutes before it hung up on me. Please call back to schedule.

## 2023-01-17 ENCOUNTER — PATIENT MESSAGE (OUTPATIENT)
Dept: PHYSICAL MEDICINE AND REHAB | Facility: CLINIC | Age: 48
End: 2023-01-17

## 2023-01-17 NOTE — TELEPHONE ENCOUNTER
From: Romain Spencer  Sent: 1/17/2023 8:37 AM CST  To: Juan Carlos Melgar  Subject: Knees xray    I took a nasty fall two weeks ago. My wrist/hand is still struggling. I think a sprain but was expecting it to be better by now. I can deal with it until I see you for injections or I can get X-ray. Let me know. Thank you.

## 2023-01-25 ENCOUNTER — TELEPHONE (OUTPATIENT)
Dept: ORTHOPEDICS CLINIC | Facility: CLINIC | Age: 48
End: 2023-01-25

## 2023-01-25 NOTE — TELEPHONE ENCOUNTER
Received a subpoena in the mail for the patient, requesting medical records. Dates of service requested: 2000 to present complete medical records, radiology images, copies of any and all medical records, signature logs/records, pharmacy refill logs/records, medical bills, or pharmacy prescription records. Patient history forms, insurance forms, workers comp forms. Form sent to [employer].

## 2023-01-26 ENCOUNTER — PATIENT MESSAGE (OUTPATIENT)
Dept: FAMILY MEDICINE CLINIC | Facility: CLINIC | Age: 48
End: 2023-01-26

## 2023-01-26 DIAGNOSIS — M79.642 LEFT HAND PAIN: Primary | ICD-10-CM

## 2023-01-27 NOTE — TELEPHONE ENCOUNTER
Orders faxed to [address] [facility] [address] at [phone]    Fax confirmation received

## 2023-01-31 PROBLEM — G95.89 MYELOMALACIA (HCC): Status: ACTIVE | Noted: 2022-10-05

## 2023-02-02 ENCOUNTER — TELEPHONE (OUTPATIENT)
Dept: SURGERY | Facility: CLINIC | Age: 48
End: 2023-02-02

## 2023-02-05 ENCOUNTER — PATIENT MESSAGE (OUTPATIENT)
Dept: FAMILY MEDICINE CLINIC | Facility: CLINIC | Age: 48
End: 2023-02-05

## 2023-02-06 ENCOUNTER — PATIENT MESSAGE (OUTPATIENT)
Dept: FAMILY MEDICINE CLINIC | Facility: CLINIC | Age: 48
End: 2023-02-06

## 2023-02-07 ENCOUNTER — OFFICE VISIT (OUTPATIENT)
Dept: FAMILY MEDICINE CLINIC | Facility: CLINIC | Age: 48
End: 2023-02-07
Payer: COMMERCIAL

## 2023-02-07 VITALS
DIASTOLIC BLOOD PRESSURE: 70 MMHG | RESPIRATION RATE: 16 BRPM | SYSTOLIC BLOOD PRESSURE: 118 MMHG | OXYGEN SATURATION: 98 % | TEMPERATURE: 98 F | BODY MASS INDEX: 24.13 KG/M2 | HEART RATE: 74 BPM | HEIGHT: 74 IN | WEIGHT: 188 LBS

## 2023-02-07 DIAGNOSIS — N32.9 URINARY BLADDER DISORDER: Primary | ICD-10-CM

## 2023-02-07 DIAGNOSIS — R61 NIGHT SWEATS: ICD-10-CM

## 2023-02-07 PROCEDURE — 3008F BODY MASS INDEX DOCD: CPT | Performed by: FAMILY MEDICINE

## 2023-02-07 PROCEDURE — 3078F DIAST BP <80 MM HG: CPT | Performed by: FAMILY MEDICINE

## 2023-02-07 PROCEDURE — 3074F SYST BP LT 130 MM HG: CPT | Performed by: FAMILY MEDICINE

## 2023-02-07 PROCEDURE — 99214 OFFICE O/P EST MOD 30 MIN: CPT | Performed by: FAMILY MEDICINE

## 2023-02-07 NOTE — PATIENT INSTRUCTIONS
Thank you for choosing Perry Martinez MD at [place] [address]  To Do: Nilda Inman  1. Please see below  [facility] is located in [address]. Monday, Tuesday & Friday - 8 a.m. to 4 p.m. Wednesday, Thursday - 7 a.m. to 3 p.m. The lab is closed daily from 12 p.m.-12:30 p.m. Saturday lab hours by appointment. Call [phone] to schedule the appointment. Please signup for [employer], which is electronic access to your record if you have not done so. All your results will post on there. [email or web address]   You can NOW use [employer] to book your appointments with us, or consider using open access scheduling which is through the edward website [email or web address] [email or web address] and type in Perry Martinez MD and follow the links for \"Schedule Online Now\"    To schedule Imaging or tests at [facility] Scheduling [phone], Go to [facility] A ER Building (For example: CT scans, X rays, Ultrasound, MRI)  Cardiac Testing in ER building Building A second floor Cardiac Testing [phone] (For example: Holter Monitor, Cardiac Stress tests,Event Monitor, or 2D Echocardiograms)  [facility] call [phone] usually in Bldg A  Walk in Clinic in [place] at [facility]. [address] Mon-Fri at 8am-7:30 p.m., and Sat/Sun 9:00a. m.-4:30 p.m. Also at [address]  Call [phone] for info     Please call our office about any questions regarding your treatment/medicines/tests as a result of today's visit. For your safety, read the entire package insert of all medicines prescribed to you and be aware of all of the risks of treatment even beyond those discussed today. All therapies have potential risk of harm or side effects or medication interactions.   It is your duty and for your safety to discuss with the pharmacist and our office with questions, and to notify us and stop treatment if problems arise, but know that our intention is that the benefits outweigh those potential risks and we strive to make you healthier and to improve your quality of life. Referrals, and Radiology Information:    If your insurance requires a referral to a specialist, please allow 5 business days to process your referral request.    If Lore Correia MD orders a CT or MRI, it may take up to 10 business days to receive approval from your insurance company. Once our office has called informing you that the insurance company approved your testing, please call Central Scheduling at [phone]  Please allow our office 5 business days to contact you regarding any testing results. Refill policies:   Allow 3 business days for refills; controlled substances may take longer and must be picked up from the office in person. Narcotic medications can only be filled in 30 day increments and must be refilled at an office visit only. If your prescription is due for a refill, you may be due for a follow-up appointment. We cannot refill your maintenance medications at a preventative wellness visit. To best provide you care, patients receiving maintenance medications need to be seen at least twice a year.

## 2023-02-10 DIAGNOSIS — M25.562 CHRONIC PAIN OF BOTH KNEES: ICD-10-CM

## 2023-02-10 DIAGNOSIS — M25.561 CHRONIC PAIN OF BOTH KNEES: ICD-10-CM

## 2023-02-10 DIAGNOSIS — G89.29 CHRONIC PAIN OF BOTH KNEES: ICD-10-CM

## 2023-02-10 DIAGNOSIS — G62.9 NEUROPATHY: ICD-10-CM

## 2023-02-10 DIAGNOSIS — M25.611 DECREASED RANGE OF MOTION OF RIGHT SHOULDER: ICD-10-CM

## 2023-02-11 NOTE — TELEPHONE ENCOUNTER
Spoke to patient state he been taking Duloxetine 1 capsule in Am,   Pain depend on the time of the day, cramping, spasms, aching. Knee pain is constant, 7-10/10. At time hands are cramping up. the neck has increased pain. Medication request:Duloxetine 20 mg oral capsule. Take 1 capsule by mouth in the morning, #90.  Refill 0    LOV: 11/09/2022  NOV: 3/7/2023 (injection)    [place]: 11/09/2022  #90

## 2023-02-13 RX ORDER — DULOXETIN HYDROCHLORIDE 20 MG/1
20 CAPSULE, DELAYED RELEASE ORAL DAILY
Qty: 90 CAPSULE | Refills: 0 | Status: SHIPPED | OUTPATIENT
Start: 2023-02-13

## 2023-02-13 NOTE — TELEPHONE ENCOUNTER
Has he been taking the medication regularly or was it discontinued by his PCP? Please also ask if this was an auto refill or if he is wanting to continue this medication.

## 2023-02-13 NOTE — TELEPHONE ENCOUNTER
S/w patient who states he is taking this medication daily and it has not been discontinued to his knowledge. Patient stating he wasn't sure about the effectiveness of the Duloxetine but patient has since run out of the medication and has noticed a severe increase in his pain, increased back pain, foot burning, neck/shoulder pain. Pt reporting \"many sleepless nights\" since he has run out of the medication. Pt does believe the duloxetine is helping with the pain.

## 2023-02-15 ENCOUNTER — PATIENT MESSAGE (OUTPATIENT)
Dept: ORTHOPEDICS CLINIC | Facility: CLINIC | Age: 48
End: 2023-02-15

## 2023-02-15 NOTE — TELEPHONE ENCOUNTER
From: Kwesi  To: Irma Guerra DPM  Sent: 2/15/2023 12:08 PM CST  Subject: Orthotics    I received today and bad blister after 30 minutes walking. Any suggestions?

## 2023-02-24 ENCOUNTER — TELEPHONE (OUTPATIENT)
Dept: FAMILY MEDICINE CLINIC | Facility: CLINIC | Age: 48
End: 2023-02-24

## 2023-02-24 NOTE — TELEPHONE ENCOUNTER
Laurie Mckeon Pt had a physical on 09/26/2022 and would like to know if you can fax a copy of the quest form completed during the physical?  Patient needs it recent to the insurance to get credit. Fax [facility] [phone] is on the form that was completed.

## 2023-02-28 ENCOUNTER — OFFICE VISIT (OUTPATIENT)
Dept: ORTHOPEDICS CLINIC | Facility: CLINIC | Age: 48
End: 2023-02-28
Payer: COMMERCIAL

## 2023-02-28 DIAGNOSIS — M92.62 HAGLUND'S DEFORMITY OF BOTH HEELS: ICD-10-CM

## 2023-02-28 DIAGNOSIS — M92.61 HAGLUND'S DEFORMITY OF BOTH HEELS: ICD-10-CM

## 2023-02-28 DIAGNOSIS — S90.822A BLISTER OF LEFT HEEL, INITIAL ENCOUNTER: Primary | ICD-10-CM

## 2023-02-28 DIAGNOSIS — M77.9 TENDONITIS: ICD-10-CM

## 2023-02-28 PROCEDURE — 99213 OFFICE O/P EST LOW 20 MIN: CPT | Performed by: PODIATRIST

## 2023-02-28 NOTE — PROGRESS NOTES
[facility] Progress Note    Subjective:     Alis is here for follow-up. He did get his orthotics and first day of use he used them on the treadmill and developed a blister. The blister is healing and he has some modalities for that. He is now used to the orthotics and is comfortable in them        Objective:     Blister is healing nicely no signs of infection posterior aspect of the left heel. Bilateral Carla's deformity noted. Assessment/Plan:     Diagnoses and all orders for this visit:    Blister of left heel, initial encounter    Carla's deformity of both heels    Tendonitis        He is improving and he is used to the orthotics at this point so we are not going to adjust them. Follow-up as needed            David Colón. Patti Denis, DAIN  [facility]    [employer] speech recognition software was used to prepare this note. If a word or phrase is confusing, it is likely do to a failure of recognition. Please contact me with any questions or clarifications.

## 2023-03-10 ENCOUNTER — OFFICE VISIT (OUTPATIENT)
Dept: PHYSICAL MEDICINE AND REHAB | Facility: CLINIC | Age: 48
End: 2023-03-10
Payer: COMMERCIAL

## 2023-03-10 DIAGNOSIS — S90.822A BLISTER OF LEFT HEEL, INITIAL ENCOUNTER: Primary | ICD-10-CM

## 2023-03-10 DIAGNOSIS — M25.561 CHRONIC PAIN OF BOTH KNEES: Primary | ICD-10-CM

## 2023-03-10 DIAGNOSIS — G89.29 CHRONIC PAIN OF BOTH KNEES: Primary | ICD-10-CM

## 2023-03-10 DIAGNOSIS — M25.562 CHRONIC PAIN OF BOTH KNEES: Primary | ICD-10-CM

## 2023-03-10 DIAGNOSIS — M92.62 HAGLUND'S DEFORMITY OF BOTH HEELS: ICD-10-CM

## 2023-03-10 DIAGNOSIS — M92.61 HAGLUND'S DEFORMITY OF BOTH HEELS: ICD-10-CM

## 2023-03-10 RX ORDER — TRIAMCINOLONE ACETONIDE 40 MG/ML
80 INJECTION, SUSPENSION INTRA-ARTICULAR; INTRAMUSCULAR ONCE
Status: COMPLETED | OUTPATIENT
Start: 2023-03-10 | End: 2023-03-10

## 2023-03-10 RX ORDER — LIDOCAINE HYDROCHLORIDE 10 MG/ML
6 INJECTION, SOLUTION INFILTRATION; PERINEURAL ONCE
Status: COMPLETED | OUTPATIENT
Start: 2023-03-10 | End: 2023-03-10

## 2023-03-10 RX ADMIN — LIDOCAINE HYDROCHLORIDE 6 ML: 10 INJECTION, SOLUTION INFILTRATION; PERINEURAL at 09:31:00

## 2023-03-10 RX ADMIN — TRIAMCINOLONE ACETONIDE 80 MG: 40 INJECTION, SUSPENSION INTRA-ARTICULAR; INTRAMUSCULAR at 09:32:00

## 2023-03-10 NOTE — PATIENT INSTRUCTIONS
Increase the duloxetine to 40mg daily and see how you do. If it gives you side effects decrease back to 20mg. Make sure your neurologist knows you are on duloxetine, the dose you are taking and what symptoms it is helping with.

## 2023-03-10 NOTE — PROCEDURES
Dx: chronic pain of both knees. Minimal osteoarthritis of both knees  Procedure: bilateral knee joint steroid injections under US guidance    The patient is here for a bilateral knee injection done under ultrasound guidance. Under ultrasound guidance the bilateral suprapatellar bursa was identified and a jessica was placed on the patient's skin. The skin was cleaned with betadine swabs x3 and anesthetized with ethyl chloride spray. Then a 25 gauge needle was inserted under ultrasound guidance into the bilateral suprapatellar bursa. Aspiration was performed and no blood, fluid, or air was aspirated. The patient was then injected with 4 ml of 1 ml of 40 mg of Kenalog/ml and 3 ml of 1% lidocaine without epinephrine. The needle was removed and a band aid was applied. Images were saved of the injections. Duloxetine for treatment of polyneuropathy was increased to 40mg; if he has side effects from the medication he will decrease back down to 20mg. He is following up with neurology, and should let the neurologist know he is on duloxetine, what dose he is currently taking, and what symptoms it is helping with.      Harish Hale DO  Physical Medicine and [address]

## 2023-03-21 ENCOUNTER — OFFICE VISIT (OUTPATIENT)
Dept: SURGERY | Facility: CLINIC | Age: 48
End: 2023-03-21

## 2023-03-21 DIAGNOSIS — R82.90 URINE FINDING: ICD-10-CM

## 2023-03-21 DIAGNOSIS — N32.81 OAB (OVERACTIVE BLADDER): Primary | ICD-10-CM

## 2023-03-21 LAB
APPEARANCE: CLEAR
BILIRUBIN: NEGATIVE
GLUCOSE (URINE DIPSTICK): NEGATIVE MG/DL
KETONES (URINE DIPSTICK): NEGATIVE MG/DL
LEUKOCYTES: NEGATIVE
MULTISTIX LOT#: NORMAL NUMERIC
NITRITE, URINE: NEGATIVE
OCCULT BLOOD: NEGATIVE
PH, URINE: 5 (ref 4.5–8)
PROTEIN (URINE DIPSTICK): NEGATIVE MG/DL
SPECIFIC GRAVITY: 1.02 (ref 1–1.03)
URINE-COLOR: YELLOW
UROBILINOGEN,SEMI-QN: 0.2 MG/DL (ref 0–1.9)

## 2023-03-21 PROCEDURE — 81003 URINALYSIS AUTO W/O SCOPE: CPT | Performed by: SURGERY

## 2023-03-21 PROCEDURE — 51798 US URINE CAPACITY MEASURE: CPT | Performed by: SURGERY

## 2023-03-21 PROCEDURE — 99204 OFFICE O/P NEW MOD 45 MIN: CPT | Performed by: SURGERY

## 2023-03-22 ENCOUNTER — PATIENT MESSAGE (OUTPATIENT)
Dept: FAMILY MEDICINE CLINIC | Facility: CLINIC | Age: 48
End: 2023-03-22

## 2023-03-22 DIAGNOSIS — J34.89 NASAL DRAINAGE: Primary | ICD-10-CM

## 2023-03-23 ENCOUNTER — ORDER TRANSCRIPTION (OUTPATIENT)
Dept: PHYSICAL THERAPY | Facility: HOSPITAL | Age: 48
End: 2023-03-23

## 2023-03-23 ENCOUNTER — PATIENT MESSAGE (OUTPATIENT)
Dept: SURGERY | Facility: CLINIC | Age: 48
End: 2023-03-23

## 2023-03-23 DIAGNOSIS — M62.89 PELVIC FLOOR DYSFUNCTION: Primary | ICD-10-CM

## 2023-03-24 ENCOUNTER — TELEPHONE (OUTPATIENT)
Dept: SURGERY | Facility: CLINIC | Age: 48
End: 2023-03-24

## 2023-03-24 RX ORDER — VIBEGRON 75 MG/1
75 TABLET, FILM COATED ORAL DAILY
Qty: 90 TABLET | Refills: 3 | Status: SHIPPED | OUTPATIENT
Start: 2023-03-24 | End: 2023-04-23

## 2023-03-31 ENCOUNTER — PATIENT MESSAGE (OUTPATIENT)
Dept: FAMILY MEDICINE CLINIC | Facility: CLINIC | Age: 48
End: 2023-03-31

## 2023-03-31 DIAGNOSIS — Z12.5 SCREENING FOR MALIGNANT NEOPLASM OF PROSTATE: Primary | ICD-10-CM

## 2023-04-04 LAB — PSA, TOTAL: 0.36 NG/ML

## 2023-04-05 ENCOUNTER — OFFICE VISIT (OUTPATIENT)
Dept: ORTHOPEDICS CLINIC | Facility: CLINIC | Age: 48
End: 2023-04-05
Payer: COMMERCIAL

## 2023-04-05 VITALS — WEIGHT: 175 LBS | HEIGHT: 74 IN | BODY MASS INDEX: 22.46 KG/M2

## 2023-04-05 DIAGNOSIS — S83.412A SPRAIN OF MEDIAL COLLATERAL LIGAMENT OF LEFT KNEE, INITIAL ENCOUNTER: Primary | ICD-10-CM

## 2023-04-05 DIAGNOSIS — M25.562 ACUTE PAIN OF LEFT KNEE: ICD-10-CM

## 2023-04-05 PROCEDURE — 3008F BODY MASS INDEX DOCD: CPT | Performed by: ORTHOPAEDIC SURGERY

## 2023-04-05 PROCEDURE — 99204 OFFICE O/P NEW MOD 45 MIN: CPT | Performed by: ORTHOPAEDIC SURGERY

## 2023-04-05 RX ORDER — MELOXICAM 15 MG/1
15 TABLET ORAL DAILY
Qty: 14 TABLET | Refills: 1 | Status: SHIPPED | OUTPATIENT
Start: 2023-04-05

## 2023-04-14 RX ORDER — GABAPENTIN 100 MG/1
CAPSULE ORAL
Qty: 180 CAPSULE | Refills: 1 | OUTPATIENT
Start: 2023-04-14

## 2023-04-15 ENCOUNTER — PATIENT MESSAGE (OUTPATIENT)
Dept: PHYSICAL MEDICINE AND REHAB | Facility: CLINIC | Age: 48
End: 2023-04-15

## 2023-04-16 RX ORDER — ALUMINUM CHLORIDE 20 %
SOLUTION, NON-ORAL TOPICAL
Qty: 75 ML | Refills: 0 | Status: SHIPPED | OUTPATIENT
Start: 2023-04-16

## 2023-04-19 ENCOUNTER — OFFICE VISIT (OUTPATIENT)
Facility: LOCATION | Age: 48
End: 2023-04-19
Payer: COMMERCIAL

## 2023-04-19 DIAGNOSIS — J34.3 NASAL TURBINATE HYPERTROPHY: Primary | ICD-10-CM

## 2023-04-19 PROCEDURE — 99213 OFFICE O/P EST LOW 20 MIN: CPT | Performed by: OTOLARYNGOLOGY

## 2023-04-19 RX ORDER — IPRATROPIUM BROMIDE 42 UG/1
2 SPRAY, METERED NASAL 2 TIMES DAILY
Qty: 15 ML | Refills: 5 | Status: SHIPPED | OUTPATIENT
Start: 2023-04-19

## 2023-04-22 ENCOUNTER — PATIENT MESSAGE (OUTPATIENT)
Facility: LOCATION | Age: 48
End: 2023-04-22

## 2023-04-22 ENCOUNTER — PATIENT MESSAGE (OUTPATIENT)
Dept: ORTHOPEDICS CLINIC | Facility: CLINIC | Age: 48
End: 2023-04-22

## 2023-04-26 RX ORDER — GLYCOPYRROLATE 1 MG/1
1 TABLET ORAL 3 TIMES DAILY
Qty: 90 TABLET | Refills: 1 | Status: SHIPPED | OUTPATIENT
Start: 2023-04-26

## 2023-05-01 ENCOUNTER — MED REC SCAN ONLY (OUTPATIENT)
Dept: ORTHOPEDICS CLINIC | Facility: CLINIC | Age: 48
End: 2023-05-01

## 2023-05-02 ENCOUNTER — PATIENT MESSAGE (OUTPATIENT)
Dept: FAMILY MEDICINE CLINIC | Facility: CLINIC | Age: 48
End: 2023-05-02

## 2023-05-03 ENCOUNTER — OFFICE VISIT (OUTPATIENT)
Dept: FAMILY MEDICINE CLINIC | Facility: CLINIC | Age: 48
End: 2023-05-03
Payer: COMMERCIAL

## 2023-05-03 VITALS
RESPIRATION RATE: 16 BRPM | HEIGHT: 74 IN | OXYGEN SATURATION: 98 % | TEMPERATURE: 98 F | WEIGHT: 182 LBS | BODY MASS INDEX: 23.36 KG/M2 | HEART RATE: 78 BPM | DIASTOLIC BLOOD PRESSURE: 62 MMHG | SYSTOLIC BLOOD PRESSURE: 108 MMHG

## 2023-05-03 DIAGNOSIS — S76.211A STRAIN OF RIGHT GROIN: Primary | ICD-10-CM

## 2023-05-03 PROCEDURE — 99213 OFFICE O/P EST LOW 20 MIN: CPT | Performed by: FAMILY MEDICINE

## 2023-05-03 PROCEDURE — 3074F SYST BP LT 130 MM HG: CPT | Performed by: FAMILY MEDICINE

## 2023-05-03 PROCEDURE — 3078F DIAST BP <80 MM HG: CPT | Performed by: FAMILY MEDICINE

## 2023-05-03 PROCEDURE — 3008F BODY MASS INDEX DOCD: CPT | Performed by: FAMILY MEDICINE

## 2023-05-03 RX ORDER — BACLOFEN 10 MG/1
TABLET ORAL
COMMUNITY
Start: 2023-04-11

## 2023-05-03 RX ORDER — BACLOFEN 20 MG/1
20 TABLET ORAL 2 TIMES DAILY
Qty: 20 TABLET | Refills: 0 | Status: SHIPPED | OUTPATIENT
Start: 2023-05-03 | End: 2023-05-13

## 2023-05-03 RX ORDER — VIBEGRON 75 MG/1
TABLET, FILM COATED ORAL
COMMUNITY
Start: 2023-04-06

## 2023-05-04 ENCOUNTER — MED REC SCAN ONLY (OUTPATIENT)
Dept: PHYSICAL MEDICINE AND REHAB | Facility: CLINIC | Age: 48
End: 2023-05-04

## 2023-05-17 ENCOUNTER — PATIENT MESSAGE (OUTPATIENT)
Dept: FAMILY MEDICINE CLINIC | Facility: CLINIC | Age: 48
End: 2023-05-17

## 2023-05-17 NOTE — TELEPHONE ENCOUNTER
Future Appointments   Date Time Provider Sanjiv Tejada   5/18/2023 11:00 AM Chip Moses MD [facility] [address] [facility]   5/22/2023  1:00 PM Dalia Pedersen MD [facility] EC [address]   6/1/2023 10:40 AM Richard Staff, Idalmis Chavez MD [facility] [place]   8/22/2023 11:00 AM MettuFrank MD S[facility] SUB GI   1/24/2024  8:00 AM Shin Dubose MD [facility] [place] [address]

## 2023-05-18 ENCOUNTER — OFFICE VISIT (OUTPATIENT)
Dept: FAMILY MEDICINE CLINIC | Facility: CLINIC | Age: 48
End: 2023-05-18
Payer: COMMERCIAL

## 2023-05-18 VITALS
OXYGEN SATURATION: 99 % | RESPIRATION RATE: 16 BRPM | DIASTOLIC BLOOD PRESSURE: 70 MMHG | TEMPERATURE: 98 F | HEART RATE: 72 BPM | WEIGHT: 183 LBS | HEIGHT: 74 IN | BODY MASS INDEX: 23.49 KG/M2 | SYSTOLIC BLOOD PRESSURE: 110 MMHG

## 2023-05-18 DIAGNOSIS — R10.31 RIGHT GROIN PAIN: Primary | ICD-10-CM

## 2023-05-18 PROCEDURE — 99213 OFFICE O/P EST LOW 20 MIN: CPT | Performed by: FAMILY MEDICINE

## 2023-05-18 PROCEDURE — 3074F SYST BP LT 130 MM HG: CPT | Performed by: FAMILY MEDICINE

## 2023-05-18 PROCEDURE — 3078F DIAST BP <80 MM HG: CPT | Performed by: FAMILY MEDICINE

## 2023-05-18 PROCEDURE — 3008F BODY MASS INDEX DOCD: CPT | Performed by: FAMILY MEDICINE

## 2023-05-18 RX ORDER — LIFITEGRAST 50 MG/ML
SOLUTION/ DROPS OPHTHALMIC
COMMUNITY
Start: 2023-05-12

## 2023-05-18 RX ORDER — MELOXICAM 7.5 MG/1
7.5 TABLET ORAL DAILY
Qty: 30 TABLET | Refills: 0 | Status: SHIPPED | OUTPATIENT
Start: 2023-05-18

## 2023-05-18 NOTE — PATIENT INSTRUCTIONS
Thank you for choosing Tricia Mae MD at [place] [address]  To Do: Romain Spencer  1. Please see below  [facility] is located in [address]. Monday, Tuesday & Friday - 8 a.m. to 4 p.m. Wednesday, Thursday - 7 a.m. to 3 p.m. The lab is closed daily from 12 p.m.-12:30 p.m. Saturday lab hours by appointment. Call [phone] to schedule the appointment. Please signup for [employer], which is electronic access to your record if you have not done so. All your results will post on there. [email or web address]   You can NOW use [employer] to book your appointments with us, or consider using open access scheduling which is through the edward website [email or web address] and type in Tricia Mae MD and follow the links for \"Schedule Online Now\"    To schedule Imaging or tests at [facility] Scheduling [phone], Go to [facility] A ER Building (For example: CT scans, X rays, Ultrasound, MRI)  Cardiac Testing in ER building Building A second floor Cardiac Testing [phone] (For example: Holter Monitor, Cardiac Stress tests,Event Monitor, or 2D Echocardiograms)  [facility] call [phone] usually in dg A  Walk in Clinic in [place] at [facility]. [address] Mon-Fri at 8am-7:30 p.m., and Sat/Sun 9:00a. m.-4:30 p.m. Also at [address] [employer]  Call [phone] for info     Please call our office about any questions regarding your treatment/medicines/tests as a result of today's visit. For your safety, read the entire package insert of all medicines prescribed to you and be aware of all of the risks of treatment even beyond those discussed today. All therapies have potential risk of harm or side effects or medication interactions.   It is your duty and for your safety to discuss with the pharmacist and our office with questions, and to notify us and stop treatment if problems arise, but know that our intention is that the benefits outweigh those potential risks and we strive to make you healthier and to improve your quality of life. Referrals, and Radiology Information:    If your insurance requires a referral to a specialist, please allow 5 business days to process your referral request.    If Brooklyn Levin MD orders a CT or MRI, it may take up to 10 business days to receive approval from your insurance company. Once our office has called informing you that the insurance company approved your testing, please call Central Scheduling at [phone]  Please allow our office 5 business days to contact you regarding any testing results. Refill policies:   Allow 3 business days for refills; controlled substances may take longer and must be picked up from the office in person. Narcotic medications can only be filled in 30 day increments and must be refilled at an office visit only. If your prescription is due for a refill, you may be due for a follow-up appointment. We cannot refill your maintenance medications at a preventative wellness visit. To best provide you care, patients receiving maintenance medications need to be seen at least twice a year.

## 2023-05-22 ENCOUNTER — OFFICE VISIT (OUTPATIENT)
Dept: SURGERY | Facility: CLINIC | Age: 48
End: 2023-05-22

## 2023-05-22 DIAGNOSIS — R82.90 URINE FINDING: Primary | ICD-10-CM

## 2023-05-22 LAB
APPEARANCE: CLEAR
BILIRUBIN: NEGATIVE
GLUCOSE (URINE DIPSTICK): NEGATIVE MG/DL
KETONES (URINE DIPSTICK): NEGATIVE MG/DL
LEUKOCYTES: NEGATIVE
MULTISTIX LOT#: NORMAL NUMERIC
NITRITE, URINE: NEGATIVE
OCCULT BLOOD: NEGATIVE
PH, URINE: 5.5 (ref 4.5–8)
PROTEIN (URINE DIPSTICK): NEGATIVE MG/DL
SPECIFIC GRAVITY: 1.03 (ref 1–1.03)
URINE-COLOR: YELLOW
UROBILINOGEN,SEMI-QN: 0.2 MG/DL (ref 0–1.9)

## 2023-05-22 PROCEDURE — 81003 URINALYSIS AUTO W/O SCOPE: CPT | Performed by: SURGERY

## 2023-05-22 PROCEDURE — 99214 OFFICE O/P EST MOD 30 MIN: CPT | Performed by: SURGERY

## 2023-05-22 PROCEDURE — 51798 US URINE CAPACITY MEASURE: CPT | Performed by: SURGERY

## 2023-05-22 RX ORDER — GLYCOPYRROLATE 1 MG/1
TABLET ORAL
Qty: 90 TABLET | Refills: 1 | Status: SHIPPED | OUTPATIENT
Start: 2023-05-22

## 2023-05-24 DIAGNOSIS — G95.89 MYELOMALACIA (HCC): ICD-10-CM

## 2023-05-24 DIAGNOSIS — G40.909 SEIZURE DISORDER (HCC): Primary | ICD-10-CM

## 2023-05-24 RX ORDER — GABAPENTIN 400 MG/1
400 CAPSULE ORAL 2 TIMES DAILY
Qty: 60 CAPSULE | Refills: 2 | Status: SHIPPED | OUTPATIENT
Start: 2023-05-24

## 2023-05-27 DIAGNOSIS — G89.29 CHRONIC PAIN OF BOTH KNEES: ICD-10-CM

## 2023-05-27 DIAGNOSIS — G62.9 NEUROPATHY: ICD-10-CM

## 2023-05-27 DIAGNOSIS — M25.611 DECREASED RANGE OF MOTION OF RIGHT SHOULDER: ICD-10-CM

## 2023-05-27 DIAGNOSIS — M25.561 CHRONIC PAIN OF BOTH KNEES: ICD-10-CM

## 2023-05-27 DIAGNOSIS — M25.562 CHRONIC PAIN OF BOTH KNEES: ICD-10-CM

## 2023-05-27 RX ORDER — DULOXETIN HYDROCHLORIDE 20 MG/1
40 CAPSULE, DELAYED RELEASE ORAL DAILY
Qty: 180 CAPSULE | Refills: 0 | OUTPATIENT
Start: 2023-05-27

## 2023-05-27 NOTE — TELEPHONE ENCOUNTER
Declined refill. Called pharmacy state that patient had refill on 4 /15/23 #180.  Refill good until [facility] July,

## 2023-06-01 ENCOUNTER — OFFICE VISIT (OUTPATIENT)
Dept: NEUROLOGY | Facility: CLINIC | Age: 48
End: 2023-06-01
Payer: COMMERCIAL

## 2023-06-01 VITALS
DIASTOLIC BLOOD PRESSURE: 66 MMHG | WEIGHT: 179 LBS | BODY MASS INDEX: 23 KG/M2 | RESPIRATION RATE: 18 BRPM | HEART RATE: 80 BPM | SYSTOLIC BLOOD PRESSURE: 108 MMHG

## 2023-06-01 DIAGNOSIS — G95.89 MYELOMALACIA (HCC): ICD-10-CM

## 2023-06-01 DIAGNOSIS — G40.909 SEIZURE DISORDER (HCC): Primary | ICD-10-CM

## 2023-06-01 DIAGNOSIS — R51.9 FACIAL PAIN: ICD-10-CM

## 2023-06-01 PROCEDURE — 3074F SYST BP LT 130 MM HG: CPT | Performed by: OTHER

## 2023-06-01 PROCEDURE — 3078F DIAST BP <80 MM HG: CPT | Performed by: OTHER

## 2023-06-01 PROCEDURE — 99213 OFFICE O/P EST LOW 20 MIN: CPT | Performed by: OTHER

## 2023-06-01 NOTE — PROGRESS NOTES
Patient reports he gets light headed when he leans forward. Still having muscle spasms and muscle cramping. No seizures.

## 2023-06-09 ENCOUNTER — MED REC SCAN ONLY (OUTPATIENT)
Dept: ORTHOPEDICS CLINIC | Facility: CLINIC | Age: 48
End: 2023-06-09

## 2023-06-14 RX ORDER — MELOXICAM 7.5 MG/1
TABLET ORAL
Qty: 30 TABLET | Refills: 0 | Status: SHIPPED | OUTPATIENT
Start: 2023-06-14

## 2023-06-19 RX ORDER — GLYCOPYRROLATE 1 MG/1
TABLET ORAL
Qty: 90 TABLET | Refills: 1 | Status: SHIPPED | OUTPATIENT
Start: 2023-06-19

## 2023-06-21 ENCOUNTER — OFFICE VISIT (OUTPATIENT)
Facility: LOCATION | Age: 48
End: 2023-06-21
Payer: COMMERCIAL

## 2023-06-21 DIAGNOSIS — J32.8 OTHER CHRONIC SINUSITIS: ICD-10-CM

## 2023-06-21 DIAGNOSIS — J34.3 NASAL TURBINATE HYPERTROPHY: Primary | ICD-10-CM

## 2023-06-21 PROCEDURE — 77011 CT SCAN FOR LOCALIZATION: CPT | Performed by: OTOLARYNGOLOGY

## 2023-06-21 PROCEDURE — 99213 OFFICE O/P EST LOW 20 MIN: CPT | Performed by: OTOLARYNGOLOGY

## 2023-06-21 RX ORDER — AMOXICILLIN AND CLAVULANATE POTASSIUM 875; 125 MG/1; MG/1
1 TABLET, FILM COATED ORAL EVERY 12 HOURS
Qty: 20 TABLET | Refills: 0 | Status: SHIPPED | OUTPATIENT
Start: 2023-06-21

## 2023-06-29 ENCOUNTER — TELEPHONE (OUTPATIENT)
Facility: LOCATION | Age: 48
End: 2023-06-29

## 2023-07-10 DIAGNOSIS — M25.561 CHRONIC PAIN OF BOTH KNEES: ICD-10-CM

## 2023-07-10 DIAGNOSIS — G89.29 CHRONIC PAIN OF BOTH KNEES: ICD-10-CM

## 2023-07-10 DIAGNOSIS — G62.9 NEUROPATHY: ICD-10-CM

## 2023-07-10 DIAGNOSIS — M25.611 DECREASED RANGE OF MOTION OF RIGHT SHOULDER: ICD-10-CM

## 2023-07-10 DIAGNOSIS — M25.562 CHRONIC PAIN OF BOTH KNEES: ICD-10-CM

## 2023-07-10 RX ORDER — DULOXETIN HYDROCHLORIDE 20 MG/1
40 CAPSULE, DELAYED RELEASE ORAL DAILY
Qty: 180 CAPSULE | Refills: 2 | Status: SHIPPED | OUTPATIENT
Start: 2023-07-10

## 2023-07-10 NOTE — TELEPHONE ENCOUNTER
Refill Request    Medication request: DULoxetine 20 MG Oral Cap DR Particles. Take 2 capsules (40 mg total) by mouth daily. LOV:3/10/2023 Jessica Rm, DO   Due back to clinic per last office note:  not mentioned  NOV: Visit date not found      [place]: 04/17/2023 #180 (90 days)    Urine drug screen (if applicable): N/A  Pain contract: N/A    LOV plan (if weaning or changing medications): Per Dr Weeks Media \"  Increase the duloxetine to 40mg daily and see how you do. If it gives you side effects decrease back to 20mg.        \"

## 2023-07-12 RX ORDER — MELOXICAM 7.5 MG/1
7.5 TABLET ORAL DAILY
Qty: 30 TABLET | Refills: 0 | Status: SHIPPED | OUTPATIENT
Start: 2023-07-12

## 2023-07-12 RX ORDER — GLYCOPYRROLATE 1 MG/1
TABLET ORAL
Qty: 90 TABLET | Refills: 1 | Status: SHIPPED | OUTPATIENT
Start: 2023-07-12

## 2023-07-24 ENCOUNTER — OFFICE VISIT (OUTPATIENT)
Facility: LOCATION | Age: 48
End: 2023-07-24
Payer: COMMERCIAL

## 2023-07-24 DIAGNOSIS — J34.3 NASAL TURBINATE HYPERTROPHY: Primary | ICD-10-CM

## 2023-07-24 NOTE — PROGRESS NOTES
Patient is here for Clarifix procedure. After informed written consent procedure was commenced. Patient was first sprayed with Afrin lidocaine and then similar solution coated with pledgets were placed in each side of the nose after 10 minutes. The pledgets were then withdrawn and patient had 1/4 cc 1% lidocaine with epinephrine 100,000 injected into the base of the middle turbinates the inferior turbinates and septum. After a further 15 minutes procedure was commenced. Clarifix device was brought into the left side of the nose under regularization was very telescope was placed into the sphenopalatine recess treated to 15 seconds. The patient then had the device thought inside his nose and it was removed. Like procedure was then carried out on the right side of the nose. The procedure was then repeated repeated with new cartridge for a total of 30 seconds on each side. Patient was observed for 10 minutes no further discomfort was noted. He is to irrigate with saline twice a day follow-up in 2 weeks for recheck.           Clarifix bilateral-2 treatments per side

## 2023-07-25 ENCOUNTER — OFFICE VISIT (OUTPATIENT)
Dept: ORTHOPEDICS CLINIC | Facility: CLINIC | Age: 48
End: 2023-07-25
Payer: COMMERCIAL

## 2023-07-25 DIAGNOSIS — S83.207A POSITIVE MCMURRAY TEST OF LEFT KNEE, INITIAL ENCOUNTER: ICD-10-CM

## 2023-07-25 DIAGNOSIS — M76.52 PATELLAR TENDINITIS OF LEFT KNEE: Primary | ICD-10-CM

## 2023-07-25 PROCEDURE — 99214 OFFICE O/P EST MOD 30 MIN: CPT | Performed by: PHYSICIAN ASSISTANT

## 2023-07-30 RX ORDER — CYCLOBENZAPRINE HCL 10 MG
10 TABLET ORAL NIGHTLY PRN
Qty: 30 TABLET | Refills: 1 | Status: SHIPPED | OUTPATIENT
Start: 2023-07-30

## 2023-07-31 ENCOUNTER — PATIENT MESSAGE (OUTPATIENT)
Facility: LOCATION | Age: 48
End: 2023-07-31

## 2023-07-31 NOTE — TELEPHONE ENCOUNTER
From: [facility]  To: Haley Churchill MD  Sent: 7/31/2023 3:11 AM CDT  Subject: Congestion     I have been struggling since my procedure blowing nose constantly.  Is this normal?

## 2023-07-31 NOTE — TELEPHONE ENCOUNTER
Called pt in regards to "[employer]. Pt advised that it can take 2-6 weeks to see benefits from the procedure. Pt understood teaching, all questions answered.

## 2023-08-07 ENCOUNTER — OFFICE VISIT (OUTPATIENT)
Facility: LOCATION | Age: 48
End: 2023-08-07
Payer: COMMERCIAL

## 2023-08-07 ENCOUNTER — TELEPHONE (OUTPATIENT)
Dept: FAMILY MEDICINE CLINIC | Facility: CLINIC | Age: 48
End: 2023-08-07

## 2023-08-07 DIAGNOSIS — J34.3 NASAL TURBINATE HYPERTROPHY: Primary | ICD-10-CM

## 2023-08-07 NOTE — TELEPHONE ENCOUNTER
Patient just had a dog bite, doesn't want to go to the ER, wanted to come here without an appt, wants medical advice, no appts available, not interested in tomorrow.

## 2023-08-07 NOTE — PROGRESS NOTES
Patient is here for recheck after Clarifix. Initially he did have more congestion but still complains of clear postnasal drip. He denies any headache. Physical exam: Right and left sides of the nose were decongested with Afrin and lidocaine. Examination was 0 to telescope found middle meatus patent bilaterally. A small mount of crust was removed by suction on the right side. Patient is doing well. He will continue irrigating twice a day and return for recheck in 3 weeks. At that point the Clarifix should have taken a hold.

## 2023-08-13 RX ORDER — MELOXICAM 7.5 MG/1
7.5 TABLET ORAL DAILY
Qty: 30 TABLET | Refills: 0 | Status: SHIPPED | OUTPATIENT
Start: 2023-08-13

## 2023-08-18 ENCOUNTER — OFFICE VISIT (OUTPATIENT)
Dept: PHYSICAL MEDICINE AND REHAB | Facility: CLINIC | Age: 48
End: 2023-08-18
Payer: COMMERCIAL

## 2023-08-18 VITALS — HEIGHT: 74 IN | BODY MASS INDEX: 23.1 KG/M2 | WEIGHT: 180 LBS

## 2023-08-18 DIAGNOSIS — R10.31 RIGHT GROIN PAIN: ICD-10-CM

## 2023-08-18 DIAGNOSIS — M79.651 RIGHT THIGH PAIN: Primary | ICD-10-CM

## 2023-08-18 PROCEDURE — 99213 OFFICE O/P EST LOW 20 MIN: CPT | Performed by: PHYSICAL MEDICINE & REHABILITATION

## 2023-08-18 PROCEDURE — 3008F BODY MASS INDEX DOCD: CPT | Performed by: PHYSICAL MEDICINE & REHABILITATION

## 2023-08-18 RX ORDER — MELOXICAM 7.5 MG/1
7.5 TABLET ORAL DAILY
Qty: 30 TABLET | Refills: 0 | Status: SHIPPED | OUTPATIENT
Start: 2023-08-18

## 2023-08-18 NOTE — PATIENT INSTRUCTIONS
Let me know how you are doing after you have completed your scheduled physical therapy. If no better with the medication and physical therapy then we will do an MRI of your right hip.

## 2023-08-22 ENCOUNTER — TELEPHONE (OUTPATIENT)
Dept: PHYSICAL MEDICINE AND REHAB | Facility: CLINIC | Age: 48
End: 2023-08-22

## 2023-08-24 ENCOUNTER — MED REC SCAN ONLY (OUTPATIENT)
Dept: ORTHOPEDICS CLINIC | Facility: CLINIC | Age: 48
End: 2023-08-24

## 2023-08-25 ENCOUNTER — MED REC SCAN ONLY (OUTPATIENT)
Dept: PHYSICAL MEDICINE AND REHAB | Facility: CLINIC | Age: 48
End: 2023-08-25

## 2023-08-28 ENCOUNTER — OFFICE VISIT (OUTPATIENT)
Facility: LOCATION | Age: 48
End: 2023-08-28
Payer: COMMERCIAL

## 2023-08-28 DIAGNOSIS — J34.3 NASAL TURBINATE HYPERTROPHY: Primary | ICD-10-CM

## 2023-08-28 DIAGNOSIS — G95.89 MYELOMALACIA (HCC): ICD-10-CM

## 2023-08-28 DIAGNOSIS — G40.909 SEIZURE DISORDER (HCC): ICD-10-CM

## 2023-08-28 NOTE — PROGRESS NOTES
Patient is here for recheck after Clarifix procedure. He thinks the drainage is cut down 75%. Physical exam: Right and left sides and nose redressed with Afrin lidocaine. Examination shows no crusting present. Turbinates healed well. Impression-doing well after Clarifix. He may use saline as needed and follow-up in 3 to 4 months for recheck. He is nearing his final result.

## 2023-08-29 ENCOUNTER — PATIENT MESSAGE (OUTPATIENT)
Dept: SURGERY | Facility: CLINIC | Age: 48
End: 2023-08-29

## 2023-08-29 ENCOUNTER — TELEPHONE (OUTPATIENT)
Dept: FAMILY MEDICINE CLINIC | Facility: CLINIC | Age: 48
End: 2023-08-29

## 2023-08-29 DIAGNOSIS — R82.90 URINE FINDING: Primary | ICD-10-CM

## 2023-08-29 DIAGNOSIS — Z00.00 LABORATORY EXAMINATION ORDERED AS PART OF A ROUTINE GENERAL MEDICAL EXAMINATION: Primary | ICD-10-CM

## 2023-08-29 RX ORDER — GABAPENTIN 400 MG/1
400 CAPSULE ORAL 2 TIMES DAILY
Qty: 60 CAPSULE | Refills: 2 | Status: SHIPPED | OUTPATIENT
Start: 2023-08-29

## 2023-09-04 ENCOUNTER — PATIENT MESSAGE (OUTPATIENT)
Dept: PHYSICAL MEDICINE AND REHAB | Facility: CLINIC | Age: 48
End: 2023-09-04

## 2023-09-04 DIAGNOSIS — R10.31 RIGHT GROIN PAIN: Primary | ICD-10-CM

## 2023-09-08 ENCOUNTER — OFFICE VISIT (OUTPATIENT)
Dept: PHYSICAL MEDICINE AND REHAB | Facility: CLINIC | Age: 48
End: 2023-09-08
Payer: COMMERCIAL

## 2023-09-08 VITALS — HEIGHT: 74 IN | WEIGHT: 185 LBS | BODY MASS INDEX: 23.74 KG/M2

## 2023-09-08 DIAGNOSIS — M76.61 TENDONITIS, ACHILLES, RIGHT: ICD-10-CM

## 2023-09-08 DIAGNOSIS — M79.671 PAIN OF RIGHT HEEL: Primary | ICD-10-CM

## 2023-09-08 PROCEDURE — 3008F BODY MASS INDEX DOCD: CPT | Performed by: PHYSICAL MEDICINE & REHABILITATION

## 2023-09-08 PROCEDURE — 99215 OFFICE O/P EST HI 40 MIN: CPT | Performed by: PHYSICAL MEDICINE & REHABILITATION

## 2023-09-08 NOTE — PROGRESS NOTES
Jose Manuel Snell,    I have reviewed your urine test results. Tests show no significant abnormalities (no signs of infection in the urine). No changes to the plan as we had previously discussed. Please let me know if you have any questions.     Thanks,  Cabrera Mills MD

## 2023-09-08 NOTE — PATIENT INSTRUCTIONS
Recommend you apply voltaren gel 2-3 times daily over the heel and lower part of the tendon for the next 2 weeks. Have the Xrays done of your right foot. Continue wearing the brace during the daytime. Use an assistive device for longer distances; if shorter distances are not overly painful in either the heel or hip you do not need to use an assistive device. See Dr. Dominick Cole or one of his partners regarding the ramus fracture.

## 2023-09-09 ENCOUNTER — PATIENT MESSAGE (OUTPATIENT)
Dept: SURGERY | Facility: CLINIC | Age: 48
End: 2023-09-09

## 2023-09-12 ENCOUNTER — PATIENT MESSAGE (OUTPATIENT)
Dept: PHYSICAL MEDICINE AND REHAB | Facility: CLINIC | Age: 48
End: 2023-09-12

## 2023-09-12 ENCOUNTER — MED REC SCAN ONLY (OUTPATIENT)
Dept: ORTHOPEDICS CLINIC | Facility: CLINIC | Age: 48
End: 2023-09-12

## 2023-09-13 ENCOUNTER — TELEPHONE (OUTPATIENT)
Dept: PHYSICAL MEDICINE AND REHAB | Facility: CLINIC | Age: 48
End: 2023-09-13

## 2023-09-13 NOTE — TELEPHONE ENCOUNTER
X-rays placed in Dr. Ortega [facility] office [facility] for review

## 2023-09-18 ENCOUNTER — OFFICE VISIT (OUTPATIENT)
Dept: ORTHOPEDICS CLINIC | Facility: CLINIC | Age: 48
End: 2023-09-18
Payer: COMMERCIAL

## 2023-09-18 VITALS — HEIGHT: 74 IN | WEIGHT: 185 LBS | BODY MASS INDEX: 23.74 KG/M2

## 2023-09-18 DIAGNOSIS — M76.61 ACHILLES TENDINITIS OF RIGHT LOWER EXTREMITY: ICD-10-CM

## 2023-09-18 DIAGNOSIS — S32.591A CLOSED FRACTURE OF RAMUS OF RIGHT PUBIS, INITIAL ENCOUNTER (HCC): Primary | ICD-10-CM

## 2023-09-20 ENCOUNTER — PATIENT MESSAGE (OUTPATIENT)
Dept: FAMILY MEDICINE CLINIC | Facility: CLINIC | Age: 48
End: 2023-09-20

## 2023-09-20 ENCOUNTER — MED REC SCAN ONLY (OUTPATIENT)
Dept: ORTHOPEDICS CLINIC | Facility: CLINIC | Age: 48
End: 2023-09-20

## 2023-09-20 DIAGNOSIS — Z13.820 SCREENING FOR OSTEOPOROSIS: Primary | ICD-10-CM

## 2023-09-21 ENCOUNTER — TELEPHONE (OUTPATIENT)
Dept: FAMILY MEDICINE CLINIC | Facility: CLINIC | Age: 48
End: 2023-09-21

## 2023-09-21 LAB
ABSOLUTE BASOPHILS: 60 CELLS/UL (ref 0–200)
ABSOLUTE EOSINOPHILS: 670 CELLS/UL (ref 15–500)
ABSOLUTE LYMPHOCYTES: 2265 CELLS/UL (ref 850–3900)
ABSOLUTE MONOCYTES: 670 CELLS/UL (ref 200–950)
ABSOLUTE NEUTROPHILS: 3035 CELLS/UL (ref 1500–7800)
ALBUMIN/GLOBULIN RATIO: 1.7 (CALC) (ref 1–2.5)
ALBUMIN: 4.5 G/DL (ref 3.6–5.1)
ALKALINE PHOSPHATASE: 73 U/L (ref 36–130)
ALT: 34 U/L (ref 9–46)
AST: 27 U/L (ref 10–40)
BASOPHILS: 0.9 %
BILIRUBIN, TOTAL: 0.5 MG/DL (ref 0.2–1.2)
BUN: 19 MG/DL (ref 7–25)
CALCIUM: 9.9 MG/DL (ref 8.6–10.3)
CARBON DIOXIDE: 30 MMOL/L (ref 20–32)
CHLORIDE: 104 MMOL/L (ref 98–110)
CHOL/HDLC RATIO: 2.5 (CALC)
CHOLESTEROL, TOTAL: 162 MG/DL
CREATININE: 0.85 MG/DL (ref 0.6–1.29)
EGFR: 107 ML/MIN/1.73M2
EOSINOPHILS: 10 %
GLOBULIN: 2.7 G/DL (CALC) (ref 1.9–3.7)
GLUCOSE: 93 MG/DL (ref 65–99)
HDL CHOLESTEROL: 66 MG/DL
HEMATOCRIT: 41.3 % (ref 38.5–50)
HEMOGLOBIN: 13.6 G/DL (ref 13.2–17.1)
LDL-CHOLESTEROL: 83 MG/DL (CALC)
LYMPHOCYTES: 33.8 %
MCH: 31.9 PG (ref 27–33)
MCHC: 32.9 G/DL (ref 32–36)
MCV: 96.7 FL (ref 80–100)
MONOCYTES: 10 %
MPV: 10.4 FL (ref 7.5–12.5)
NEUTROPHILS: 45.3 %
NON-HDL CHOLESTEROL: 96 MG/DL (CALC)
PLATELET COUNT: 269 THOUSAND/UL (ref 140–400)
POTASSIUM: 4.5 MMOL/L (ref 3.5–5.3)
PROTEIN, TOTAL: 7.2 G/DL (ref 6.1–8.1)
RDW: 11.7 % (ref 11–15)
RED BLOOD CELL COUNT: 4.27 MILLION/UL (ref 4.2–5.8)
SODIUM: 141 MMOL/L (ref 135–146)
TRIGLYCERIDES: 51 MG/DL
TSH W/REFLEX TO FT4: 0.92 MIU/L (ref 0.4–4.5)
WHITE BLOOD CELL COUNT: 6.7 THOUSAND/UL (ref 3.8–10.8)

## 2023-09-21 NOTE — TELEPHONE ENCOUNTER
Recd subpoena for medical records and billing statements from [facility], [address] [place] [address], [address] [place] [address], [address] [place] [address] , with phone [phone] and fax [phone]. Request is for all records, imaging,  and billing from February 2023 to present. Faxed to [facility] for processing. Confirmation recd.

## 2023-09-28 ENCOUNTER — TELEPHONE (OUTPATIENT)
Dept: PHYSICAL MEDICINE AND REHAB | Facility: CLINIC | Age: 48
End: 2023-09-28

## 2023-09-28 ENCOUNTER — OFFICE VISIT (OUTPATIENT)
Dept: FAMILY MEDICINE CLINIC | Facility: CLINIC | Age: 48
End: 2023-09-28
Payer: COMMERCIAL

## 2023-09-28 VITALS
HEIGHT: 74 IN | SYSTOLIC BLOOD PRESSURE: 110 MMHG | RESPIRATION RATE: 16 BRPM | TEMPERATURE: 98 F | BODY MASS INDEX: 24 KG/M2 | DIASTOLIC BLOOD PRESSURE: 70 MMHG | OXYGEN SATURATION: 98 % | WEIGHT: 187 LBS | HEART RATE: 74 BPM

## 2023-09-28 DIAGNOSIS — M85.80 OSTEOPENIA, UNSPECIFIED LOCATION: ICD-10-CM

## 2023-09-28 DIAGNOSIS — Z00.00 WELLNESS EXAMINATION: Primary | ICD-10-CM

## 2023-09-28 PROCEDURE — 3074F SYST BP LT 130 MM HG: CPT | Performed by: FAMILY MEDICINE

## 2023-09-28 PROCEDURE — 99396 PREV VISIT EST AGE 40-64: CPT | Performed by: FAMILY MEDICINE

## 2023-09-28 PROCEDURE — 99213 OFFICE O/P EST LOW 20 MIN: CPT | Performed by: FAMILY MEDICINE

## 2023-09-28 PROCEDURE — 3008F BODY MASS INDEX DOCD: CPT | Performed by: FAMILY MEDICINE

## 2023-09-28 PROCEDURE — 3078F DIAST BP <80 MM HG: CPT | Performed by: FAMILY MEDICINE

## 2023-09-28 RX ORDER — ALENDRONATE SODIUM 70 MG/1
70 TABLET ORAL
Qty: 12 TABLET | Refills: 1 | Status: SHIPPED | OUTPATIENT
Start: 2023-09-28

## 2023-09-28 NOTE — TELEPHONE ENCOUNTER
X-ray report from [facility] imaging center received performed 9/12/2023, 2 view x-ray of the right foot. Impression is no evidence of acute fracture or dislocation. Tiny plantar calcaneal spur and Achilles enthesophyte. Will have the report scanned into the system.

## 2023-09-28 NOTE — PATIENT INSTRUCTIONS
Thank you for choosing Valentine Allen MD at [place] [address]  To Do: Aries Rosado  1. Please see age appropriate health prevention below    [facility] is located in [address]. Monday, Tuesday & Friday - 8 a.m. to 4 p.m. Wednesday, Thursday - 7 a.m. to 3 p.m. The lab is closed daily from 12 p.m.-12:30 p.m. Saturday lab hours by appointment. Call [phone] to schedule the appointment. Please signup for [employer], which is electronic access to your record if you have not done so. All your results will post on there. [email or web address]   You can NOW use [employer] to book your appointments with us, or consider using open access scheduling which is through the edward website [email or web address] and type in Valentine Allen MD and follow the links for \"Schedule Online Now\"    To schedule Imaging or tests at [facility] Scheduling [phone], Go to [place] A ER Building (For example: CT scans, X rays, Ultrasound, MRI)  Cardiac Testing in ER building Building A second floor Cardiac Testing [phone] (For example: Holter Monitor, Cardiac Stress tests,Event Monitor, or 2D Echocardiograms)  [facility] call [phone] usually in Bldg A  Walk in Clinic in [place] at [facility]. [address] Mon-Fri at 8am-7:30 p.m., and Sat/Sun 9:00a. m.-4:30 p.m. Also at [facility]  Call [phone] for info     Please call our office about any questions regarding your treatment/medicines/tests as a result of today's visit. For your safety, read the entire package insert of all medicines prescribed to you and be aware of all of the risks of treatment even beyond those discussed today. All therapies have potential risk of harm or side effects or medication interactions.   It is your duty and for your safety to discuss with the pharmacist and our office with questions, and to notify us and stop treatment if problems arise, but know that our intention is that the benefits outweigh those potential risks and we strive to make you healthier and to improve your quality of life. Referrals, and Radiology Information:    If your insurance requires a referral to a specialist, please allow 5 business days to process your referral request.    If Daniel Willingham MD orders a CT or MRI, it may take up to 10 business days to receive approval from your insurance company. Once our office has called informing you that the insurance company approved your testing, please call Central Scheduling at [phone]  Please allow our office 5 business days to contact you regarding any testing results. Refill policies:   Allow 3 business days for refills; controlled substances may take longer and must be picked up from the office in person. Narcotic medications can only be filled in 30 day increments and must be refilled at an office visit only. If your prescription is due for a refill, you may be due for a follow-up appointment. We cannot refill your maintenance medications at a preventative wellness visit. To best provide you care, patients receiving maintenance medications need to be seen at least twice a year.

## 2023-10-11 ENCOUNTER — OFFICE VISIT (OUTPATIENT)
Dept: ORTHOPEDICS CLINIC | Facility: CLINIC | Age: 48
End: 2023-10-11
Payer: COMMERCIAL

## 2023-10-11 ENCOUNTER — TELEPHONE (OUTPATIENT)
Dept: ORTHOPEDICS CLINIC | Facility: CLINIC | Age: 48
End: 2023-10-11

## 2023-10-11 DIAGNOSIS — M62.461 CONTRACTURE OF MUSCLE OF BOTH LOWER LEGS: ICD-10-CM

## 2023-10-11 DIAGNOSIS — M62.462 CONTRACTURE OF MUSCLE OF BOTH LOWER LEGS: ICD-10-CM

## 2023-10-11 DIAGNOSIS — M92.61 HAGLUND'S DEFORMITY OF BOTH HEELS: Primary | ICD-10-CM

## 2023-10-11 DIAGNOSIS — M92.62 HAGLUND'S DEFORMITY OF BOTH HEELS: Primary | ICD-10-CM

## 2023-10-11 DIAGNOSIS — M76.61 ACHILLES TENDINITIS OF BOTH LOWER EXTREMITIES: ICD-10-CM

## 2023-10-11 DIAGNOSIS — M76.62 ACHILLES TENDINITIS OF BOTH LOWER EXTREMITIES: ICD-10-CM

## 2023-10-11 DIAGNOSIS — G62.9 NEUROPATHY: ICD-10-CM

## 2023-10-11 PROCEDURE — 99204 OFFICE O/P NEW MOD 45 MIN: CPT | Performed by: ORTHOPAEDIC SURGERY

## 2023-10-11 RX ORDER — CYCLOBENZAPRINE HCL 5 MG
5 TABLET ORAL 3 TIMES DAILY PRN
COMMUNITY
Start: 2023-10-10
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
## 2023-10-11 NOTE — TELEPHONE ENCOUNTER
Pt called stating pt had an appointment today 10-11-23. Pt is scheduled for the mri 10-19-23. Advised to schedule follow up telephone appointment.   Please call pt

## 2023-10-12 ENCOUNTER — PATIENT MESSAGE (OUTPATIENT)
Dept: ORTHOPEDICS CLINIC | Facility: CLINIC | Age: 48
End: 2023-10-12

## 2023-10-15 DIAGNOSIS — M79.651 RIGHT THIGH PAIN: ICD-10-CM

## 2023-10-15 DIAGNOSIS — R10.31 RIGHT GROIN PAIN: ICD-10-CM

## 2023-10-16 NOTE — TELEPHONE ENCOUNTER
Refill Request    Medication request: Meloxicam 7.5 MG Oral Tab. Take 1 tablet (7.5 mg total) by mouth daily. LOV:9/8/2023 Ronald Macias DO   Due back to clinic per last office note:  He will follow up with me after the MRI and sometime after he sees Dr. Freddie Mcknight. NOV: Visit date not found      [place]: 09/11/2023 #30     Urine drug screen (if applicable): N/A  Pain contract: N/A    LOV plan (if weaning or changing medications): not mentioned.

## 2023-10-17 RX ORDER — MELOXICAM 7.5 MG/1
7.5 TABLET ORAL DAILY
Qty: 30 TABLET | Refills: 0 | Status: SHIPPED | OUTPATIENT
Start: 2023-10-17

## 2023-10-24 ENCOUNTER — MED REC SCAN ONLY (OUTPATIENT)
Dept: ORTHOPEDICS CLINIC | Facility: CLINIC | Age: 48
End: 2023-10-24

## 2023-10-25 ENCOUNTER — PATIENT MESSAGE (OUTPATIENT)
Dept: PHYSICAL MEDICINE AND REHAB | Facility: CLINIC | Age: 48
End: 2023-10-25

## 2023-10-25 ENCOUNTER — VIRTUAL PHONE E/M (OUTPATIENT)
Dept: ORTHOPEDICS CLINIC | Facility: CLINIC | Age: 48
End: 2023-10-25

## 2023-10-25 ENCOUNTER — OFFICE VISIT (OUTPATIENT)
Dept: ORTHOPEDICS CLINIC | Facility: CLINIC | Age: 48
End: 2023-10-25

## 2023-10-25 DIAGNOSIS — M84.376A STRESS FRACTURE OF CALCANEUS DUE TO MULTIPLE OR REPETITIVE STRESS, INITIAL ENCOUNTER: ICD-10-CM

## 2023-10-25 DIAGNOSIS — M25.551 RIGHT HIP PAIN: Primary | ICD-10-CM

## 2023-10-25 DIAGNOSIS — S92.014A CLOSED NONDISPLACED FRACTURE OF BODY OF RIGHT CALCANEUS, INITIAL ENCOUNTER: Primary | ICD-10-CM

## 2023-10-25 DIAGNOSIS — S32.591A CLOSED FRACTURE OF RAMUS OF RIGHT PUBIS, INITIAL ENCOUNTER (HCC): ICD-10-CM

## 2023-10-25 DIAGNOSIS — M81.0 AGE-RELATED OSTEOPOROSIS WITHOUT CURRENT PATHOLOGICAL FRACTURE: ICD-10-CM

## 2023-10-25 PROCEDURE — 99443 PHONE E/M BY PHYS 21-30 MIN: CPT | Performed by: ORTHOPAEDIC SURGERY

## 2023-10-25 PROCEDURE — 99214 OFFICE O/P EST MOD 30 MIN: CPT | Performed by: ORTHOPAEDIC SURGERY

## 2023-10-25 NOTE — PROGRESS NOTES
No chief complaint on file. HPI  Telephone visit was performed with this patient for 21 minutes per the patient's request.  Verbal consent was obtained. He states his MRIs were completed and were uploaded    Imaging seen and reviewed by me:   MRI bilateral ankles  R ankle: significant calcaneal edema consistent with a stress fracture of his calcaneus. He has some mild edema along the Achilles tendon insertion. No interstitial tearing present. Left ankle with mild calcaneal edema along the superior aspect of the calcaneus consistent with a stress fracture. Mild edema along Achilles insertion with no interstitial tearing present. MRI of the R hip demonstrating . A nondisplaced fracture of the superior and inferior pubic rami with degeneration of the right hip    Assessment/Plan: 50year old year old male presenting  with nondisplaced bilateral calcaneal fractures due to stress. this is likely due to his neuropathy. He would benefit from treatment for his osteoporosis this is a fragility fracture now and he also has a stress fracture of his pubic rami. I explained potential future surgical intervention to remove the large protuberance along his Achilles insertion in his insertional Achilles tendinitis. He should begin weightbearing in a boot on the right side and would benefit with crutches while he has continued pain. He will be in the boot for approximately 4 weeks and then may transition to a regular shoe he should follow-up in 2 months for repeat x-rays of his heel  If he has worsening pain prior to this he should return for follow-up x-ray. Home exercises were given to him.     Evelyn Rodriguez, DO  10/25/23

## 2023-10-26 ENCOUNTER — TELEPHONE (OUTPATIENT)
Dept: ORTHOPEDICS CLINIC | Facility: CLINIC | Age: 48
End: 2023-10-26

## 2023-10-26 NOTE — TELEPHONE ENCOUNTER
Pt called stating pt had a phone appointment yesterday 10-25-23. Unable to see the information on [facility] where pt is being referred to for the boot.   Please call pt

## 2023-10-26 NOTE — TELEPHONE ENCOUNTER
From: Cj Encinas  To: Abilio Agustin  Sent: 10/25/2023 7:17 PM CDT  Subject: MRI    I had C and T spine uploaded there. Seems neck is worse. Wondering if you might look at? Shoulder is achy. Could they be linked. FYI. I was diagnosed with Osteopenia per scan. Dr Senthil Dutton said Maricarmen Washington crossed over to osteoporosis.

## 2023-10-27 NOTE — TELEPHONE ENCOUNTER
MRI C-spine report reviewed; based on description possibly but not likely causing shoulder pain. Would need to see patient for follow up to be more definitive.

## 2023-10-28 ENCOUNTER — PATIENT MESSAGE (OUTPATIENT)
Dept: ORTHOPEDICS CLINIC | Facility: CLINIC | Age: 48
End: 2023-10-28

## 2023-10-29 DIAGNOSIS — G95.89 MYELOMALACIA (HCC): ICD-10-CM

## 2023-10-29 DIAGNOSIS — G40.909 SEIZURE DISORDER (HCC): ICD-10-CM

## 2023-10-30 ENCOUNTER — TELEPHONE (OUTPATIENT)
Dept: ORTHOPEDICS CLINIC | Facility: CLINIC | Age: 48
End: 2023-10-30

## 2023-10-30 NOTE — TELEPHONE ENCOUNTER
Per pt has an appointment tomorrow at [facility] for a boot and they need the order asap. Per pt asking to please message via [facility] to confirm it was faxed.  Thank you    Fax: [phone]

## 2023-10-30 NOTE — TELEPHONE ENCOUNTER
LOV:10/25/23   \"FOLLOW-UP:   Return to clinic after MRI completion. \"    MRI results  IMPRESSION:  1. Nondisplaced fracture of the right inferior pubic ramus ending of the left superior pubic ramus. 2.Tear of the anterior superior labrum. 3.Degenerative arthropathy of the right hip. 4.Insertional tendinopathy of the right gluteus medius and right gluteus minimus. Mild tendinopathy of the right  common origin of hamstrings. 5.Degenerative arthropathy of the sacroiliac joints and degeneration pubic symphysis.     Future Appointments   Date Time Provider Sanjiv Tejada   11/15/2023  9:40 AM Zach Cleveland DO [facility]   11/22/2023 10:30 AM Last, Vonnie [place], APRN SGINP E[facility] SUB GI   12/18/2023  2:45 PM Ilda Galvez MD [facility] DERM [facility]   12/19/2023  1:30 PM Sharon Perkins MD [facility]   12/27/2023  1:45 PM Anette Butter, DO ECLMBORTHO [facility] [place]   3/26/2024 10:00 AM Boyd Todd MD EMG [address] [facility] [address]

## 2023-10-31 ENCOUNTER — OFFICE VISIT (OUTPATIENT)
Dept: ORTHOPEDICS CLINIC | Facility: CLINIC | Age: 48
End: 2023-10-31

## 2023-10-31 ENCOUNTER — PATIENT MESSAGE (OUTPATIENT)
Dept: ORTHOPEDICS CLINIC | Facility: CLINIC | Age: 48
End: 2023-10-31

## 2023-10-31 DIAGNOSIS — M67.959 TENDINOPATHY OF GLUTEUS MEDIUS: ICD-10-CM

## 2023-10-31 DIAGNOSIS — M25.551 RIGHT HIP PAIN: Primary | ICD-10-CM

## 2023-10-31 DIAGNOSIS — S32.591A CLOSED FRACTURE OF RAMUS OF RIGHT PUBIS, INITIAL ENCOUNTER (HCC): ICD-10-CM

## 2023-10-31 PROCEDURE — 99213 OFFICE O/P EST LOW 20 MIN: CPT | Performed by: PHYSICIAN ASSISTANT

## 2023-10-31 RX ORDER — GABAPENTIN 400 MG/1
400 CAPSULE ORAL 2 TIMES DAILY
Qty: 60 CAPSULE | Refills: 2 | OUTPATIENT
Start: 2023-10-31

## 2023-10-31 NOTE — TELEPHONE ENCOUNTER
Spoke with Palma Markham at [facility] who states disregard refill request.

## 2023-11-01 NOTE — TELEPHONE ENCOUNTER
Per last televisit on 10/25/23-   \"Home exercises were given to him \"    I don't see any exercises attaches to chart/AVS or any PT.    Also do you want pt to sleep with boot on?

## 2023-11-01 NOTE — TELEPHONE ENCOUNTER
He doesn't need to sleep in the boot. Only wear when weight bearing. He just needs to stretch his hamstring and Achilles right now.  I sent him a couple exercises but we talked about them in the office when I saw him

## 2023-11-02 ENCOUNTER — MED REC SCAN ONLY (OUTPATIENT)
Dept: ORTHOPEDICS CLINIC | Facility: CLINIC | Age: 48
End: 2023-11-02

## 2023-11-05 NOTE — TELEPHONE ENCOUNTER
He can do those exercises non weight bearing while his fracture heals. Basically he shouldn't have pain in his heel with those exercises. I probably would wait on the bike for a month until the fracture starts healing. He could do some exercises in the pool and upper body exercises.

## 2023-11-08 ENCOUNTER — MED REC SCAN ONLY (OUTPATIENT)
Dept: ORTHOPEDICS CLINIC | Facility: CLINIC | Age: 48
End: 2023-11-08

## 2023-11-10 ENCOUNTER — OFFICE VISIT (OUTPATIENT)
Dept: PHYSICAL MEDICINE AND REHAB | Facility: CLINIC | Age: 48
End: 2023-11-10
Payer: COMMERCIAL

## 2023-11-10 VITALS — HEIGHT: 74 IN | WEIGHT: 187 LBS | BODY MASS INDEX: 24 KG/M2

## 2023-11-10 DIAGNOSIS — M79.18 MYOFASCIAL PAIN: Primary | ICD-10-CM

## 2023-11-10 DIAGNOSIS — M75.21 BICIPITAL TENDONITIS OF RIGHT SHOULDER: ICD-10-CM

## 2023-11-10 PROCEDURE — 99214 OFFICE O/P EST MOD 30 MIN: CPT | Performed by: PHYSICAL MEDICINE & REHABILITATION

## 2023-11-10 PROCEDURE — 3008F BODY MASS INDEX DOCD: CPT | Performed by: PHYSICAL MEDICINE & REHABILITATION

## 2023-11-10 RX ORDER — METHYLPREDNISOLONE 4 MG/1
TABLET ORAL
Qty: 1 EACH | Refills: 0 | Status: SHIPPED | OUTPATIENT
Start: 2023-11-10

## 2023-11-14 DIAGNOSIS — M79.651 RIGHT THIGH PAIN: ICD-10-CM

## 2023-11-14 DIAGNOSIS — R10.31 RIGHT GROIN PAIN: ICD-10-CM

## 2023-11-14 RX ORDER — MELOXICAM 7.5 MG/1
7.5 TABLET ORAL DAILY
Qty: 30 TABLET | Refills: 0 | Status: SHIPPED | OUTPATIENT
Start: 2023-11-14

## 2023-11-14 NOTE — TELEPHONE ENCOUNTER
Refill Request    Medication request: Meloxicam 7.5 MG Oral Tab    Route: Take 1 tablet (7.5 mg total) by mouth daily.      LOV:11/10/2023 Tony Mathis DO   Due back to clinic per last office note: No mention of follow up    NOV: Visit date not found      [place]: 10/17/2023 #30    Urine drug screen (if applicable): N/A  Pain contract: N/A    LOV plan (if weaning or changing medications): No mention of changes

## 2023-11-19 RX ORDER — ALUMINUM CHLORIDE 20 %
1 SOLUTION, NON-ORAL TOPICAL NIGHTLY
Qty: 75 ML | Refills: 0 | Status: SHIPPED | OUTPATIENT
Start: 2023-11-19

## 2023-11-22 PROBLEM — M16.0 BILATERAL HIP JOINT ARTHRITIS: Status: ACTIVE | Noted: 2023-11-17

## 2023-11-22 RX ORDER — GLYCOPYRROLATE 1 MG/1
1 TABLET ORAL 3 TIMES DAILY
Qty: 90 TABLET | Refills: 1 | Status: SHIPPED | OUTPATIENT
Start: 2023-11-22

## 2023-11-22 NOTE — TELEPHONE ENCOUNTER
Requested Prescriptions     Pending Prescriptions Disp Refills    glycopyrrolate 1 MG Oral Tab 90 tablet 1     Sig: Take 1 tablet (1 mg total) by mouth 3 (three) times daily.      FILLED- 7/12/23  LOV- 8/28/23    Future Appointments                        12/19/2023  1:30 PM Sofiya Haro MD [facility]

## 2023-12-03 DIAGNOSIS — G95.89 MYELOMALACIA (HCC): ICD-10-CM

## 2023-12-03 DIAGNOSIS — G40.909 SEIZURE DISORDER (HCC): ICD-10-CM

## 2023-12-05 RX ORDER — GABAPENTIN 400 MG/1
400 CAPSULE ORAL 2 TIMES DAILY
Qty: 60 CAPSULE | Refills: 5 | Status: SHIPPED | OUTPATIENT
Start: 2023-12-05

## 2023-12-05 NOTE — TELEPHONE ENCOUNTER
Medication: gabapentin 400 MG Oral Cap      Date of last refill: 08/29/2023 (#60/2)  [place]     Last office visit: 06/01/2023  Due back to clinic per last office note:  Around 06/01/2024  Date next office visit scheduled:    Future Appointments   Date Time Provider Sanjiv Mallory   12/18/2023  2:45 PM Ilda Galvez MD [facility] [place]   12/27/2023  1:45 PM Catie Butter, DO [address] [facility] [place]   2/26/2024  9:40 AM Last, Vonnie [place], APRN SGINP [facility]   3/26/2024 10:00 AM Boyd Todd MD [facility] [address] [facility] [address]           Last OV note recommendation:    ASSESSMENT/PLAN:   (G40.909) Seizure disorder ([facility] [address]  (primary encounter diagnosis)        (G95.89) Myelomalacia ([facility] [address]        (R51.9) Facial pain              1. 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 In clinical remission, Keppra weaned off  Monitor and if any recurrence may resume Keppra     2. Memory and cognitive problem  Completed Neuropsychology evaluation at [facility] per patient  Report not available. Advise to provide us a copy of the evaluation        3. 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 Had seen multiple neurologists in the past  No e/o demyelinating disease. Given presentation possible ischemic event vs idiopathic myelitis        4. Facial pain in trigeminal distribution  Stable on high dose Gabapentin. RTC in about 12 months or sooner if clinically necessary.  He follows with Neurology at [facility] as well        Hiro Kitchen MD  [facility]

## 2023-12-14 RX ORDER — GLYCOPYRROLATE 1 MG/1
1 TABLET ORAL 3 TIMES DAILY
Qty: 135 TABLET | Refills: 1 | Status: SHIPPED | OUTPATIENT
Start: 2023-12-14

## 2023-12-15 DIAGNOSIS — M79.651 RIGHT THIGH PAIN: ICD-10-CM

## 2023-12-15 DIAGNOSIS — R10.31 RIGHT GROIN PAIN: ICD-10-CM

## 2023-12-15 RX ORDER — MELOXICAM 7.5 MG/1
7.5 TABLET ORAL DAILY
Qty: 30 TABLET | Refills: 0 | Status: SHIPPED | OUTPATIENT
Start: 2023-12-15

## 2023-12-15 NOTE — TELEPHONE ENCOUNTER
Refill Request    Medication request: MELOXICAM 7.5 MG Oral Tab. TAKE 1 TABLET BY MOUTH EVERY DAY      LOV:11/10/2023 Douglas Nettles, DO   Due back to clinic per last office note:   He will let me know if he has not had any improvement at all in 2 weeks. NOV: Visit date not found      [place]: 11/14/2023 #30    Urine drug screen (if applicable): N/A  Pain contract: N/A    LOV plan (if weaning or changing medications): Meloxicam 7.5 PRN . On 11/13/2023 Pt's message Valerie Graham you for letting me know; please continue with the current plan of treatment and we will go from there.  \"

## 2023-12-22 ENCOUNTER — PATIENT MESSAGE (OUTPATIENT)
Dept: SURGERY | Facility: CLINIC | Age: 48
End: 2023-12-22

## 2023-12-22 ENCOUNTER — TELEPHONE (OUTPATIENT)
Dept: SURGERY | Facility: CLINIC | Age: 48
End: 2023-12-22

## 2023-12-22 NOTE — TELEPHONE ENCOUNTER
This encounter is now closed. RN replied to patient via [facility] [address].

## 2023-12-27 ENCOUNTER — OFFICE VISIT (OUTPATIENT)
Dept: ORTHOPEDICS CLINIC | Facility: CLINIC | Age: 48
End: 2023-12-27
Payer: COMMERCIAL

## 2023-12-27 ENCOUNTER — HOSPITAL ENCOUNTER (OUTPATIENT)
Dept: GENERAL RADIOLOGY | Age: 48
Discharge: HOME | End: 2023-12-27
Attending: ORTHOPAEDIC SURGERY
Payer: COMMERCIAL

## 2023-12-27 DIAGNOSIS — S92.014D CLOSED NONDISPLACED FRACTURE OF BODY OF RIGHT CALCANEUS WITH ROUTINE HEALING, SUBSEQUENT ENCOUNTER: Primary | ICD-10-CM

## 2023-12-27 DIAGNOSIS — M76.31 ILIOTIBIAL BAND SYNDROME OF RIGHT SIDE: ICD-10-CM

## 2023-12-27 DIAGNOSIS — M76.62 ACHILLES TENDINITIS OF BOTH LOWER EXTREMITIES: ICD-10-CM

## 2023-12-27 DIAGNOSIS — M76.32 ILIOTIBIAL BAND SYNDROME OF LEFT SIDE: ICD-10-CM

## 2023-12-27 DIAGNOSIS — M76.61 ACHILLES TENDINITIS OF BOTH LOWER EXTREMITIES: ICD-10-CM

## 2023-12-27 DIAGNOSIS — M81.0 AGE-RELATED OSTEOPOROSIS WITHOUT CURRENT PATHOLOGICAL FRACTURE: ICD-10-CM

## 2023-12-27 DIAGNOSIS — M92.61 HAGLUND'S DEFORMITY OF BOTH HEELS: ICD-10-CM

## 2023-12-27 DIAGNOSIS — S92.014D CLOSED NONDISPLACED FRACTURE OF BODY OF RIGHT CALCANEUS WITH ROUTINE HEALING, SUBSEQUENT ENCOUNTER: ICD-10-CM

## 2023-12-27 DIAGNOSIS — M92.62 HAGLUND'S DEFORMITY OF BOTH HEELS: ICD-10-CM

## 2023-12-27 PROCEDURE — 73630 X-RAY EXAM OF FOOT: CPT | Performed by: ORTHOPAEDIC SURGERY

## 2023-12-27 PROCEDURE — 99214 OFFICE O/P EST MOD 30 MIN: CPT | Performed by: ORTHOPAEDIC SURGERY

## 2023-12-27 RX ORDER — TERIPARATIDE 250 UG/ML
20 INJECTION, SOLUTION SUBCUTANEOUS DAILY
COMMUNITY
Start: 2023-12-01

## 2023-12-27 NOTE — PROGRESS NOTES
Chief Complaint   Patient presents with    Follow - Up     Bilateral heel pain. Patient denies any pain at this time. HPI  Pt notes significant improvement in his heel pain. His right heel pain was worse than his left. He is going to be treated for osteoporosis with Forteo through the [facility] clinic. Physical Exam  Gen: NAD, alert, answering questions appropriately  HEENT: NCAT, EOMI  Lungs: NL breathing, symmetrical lung expansion  Abd: Soft, ND  Extremities: ataxic gait  R Ankle with neutral alignment, DF/PF/EHL intact, SILT, cap refill brisk hamstring/achilles tightness. Weakness but able to perform single limb heel rise calcaneus nontender    Imaging seen and reviewed by me:   Stable alignment of calcaneus with mild increased density along posterior aspect of calcaneal body. Small exostosis noted along Achilles insertion. No calcaneal body collapse or visible fracture present. Assessment/Plan: 50year old year old male presenting 1. Closed nondisplaced fracture of body of right calcaneus with routine healing, subsequent encounter  Continue weightbearing as tolerated in a regular shoe. Repeat x-rays in 2 months if pain persists. - XR FOOT WEIGHTBEARING (3 VIEWS), RIGHT   (CPT=73630); Future    2. Age-related osteoporosis without current pathological fracture  Weight training exercise were discussed. Calcium supplementation and vitamin D supplementation. 3. Carla's deformity of both heels  Home stretches/shoewear modifications    4. Achilles tendinitis of both lower extremities  Home stretches were shown to him in the office today and night splinting was recommended    5. Iliotibial band syndrome of right side  Home stretches were shown to him today to increase flexibility and improve hip pain. He can take ibuprofen 600 mg every 6 hours as needed for pain. 6. Iliotibial band syndrome of left side  Home stretches were shown to him today to increase flexibility and improve hip pain.   He can take ibuprofen 600 mg every 6 hours as needed for pain     Patient should follow-up in 3 months to be continues to have pain. We discussed potential future surgical intervention for his Achilles tendinitis.     Fabricio [place] DO  12/27/23

## 2023-12-31 DIAGNOSIS — M25.561 CHRONIC PAIN OF BOTH KNEES: ICD-10-CM

## 2023-12-31 DIAGNOSIS — G89.29 CHRONIC PAIN OF BOTH KNEES: ICD-10-CM

## 2023-12-31 DIAGNOSIS — M25.562 CHRONIC PAIN OF BOTH KNEES: ICD-10-CM

## 2023-12-31 DIAGNOSIS — M25.611 DECREASED RANGE OF MOTION OF RIGHT SHOULDER: ICD-10-CM

## 2023-12-31 DIAGNOSIS — G62.9 NEUROPATHY: ICD-10-CM

## 2024-01-02 RX ORDER — DULOXETIN HYDROCHLORIDE 20 MG/1
40 CAPSULE, DELAYED RELEASE ORAL DAILY
Qty: 180 CAPSULE | Refills: 2 | Status: SHIPPED | OUTPATIENT
Start: 2024-01-02

## 2024-01-02 NOTE — TELEPHONE ENCOUNTER
Refill Request    Medication request: DULoxetine 20 MG Oral Cap DR Particles  Take 2 capsules (40 mg total) by mouth daily.     LOV:11/10/2023 Wiley Escalante DO   Due back to clinic per last office note:  \" He will let me know if he has not had any improvement at all in 2 weeks. \"  NOV: 1/3/2024 Wiley Escalante DO      [place]: 10/06/2023 #180    Urine drug screen (if applicable): N/A  Pain contract: N/A    LOV plan (if weaning or changing medications): Per Dr. Escalante's LOV note: \" Recommend Medrol Dosepak, continue physical therapy for cervical stabilization and myofascial soft tissue techniques. \"      Patient has been on stable dose of Duloxetine since July 2023 or longer.  Ok to refill per protocol.

## 2024-01-03 ENCOUNTER — OFFICE VISIT (OUTPATIENT)
Dept: SURGERY | Facility: CLINIC | Age: 49
End: 2024-01-03
Payer: COMMERCIAL

## 2024-01-03 ENCOUNTER — OFFICE VISIT (OUTPATIENT)
Dept: PHYSICAL MEDICINE AND REHAB | Facility: CLINIC | Age: 49
End: 2024-01-03
Payer: COMMERCIAL

## 2024-01-03 VITALS — HEART RATE: 73 BPM | OXYGEN SATURATION: 96 % | HEIGHT: 74 IN | WEIGHT: 192 LBS | BODY MASS INDEX: 24.64 KG/M2

## 2024-01-03 DIAGNOSIS — R82.90 URINE FINDING: ICD-10-CM

## 2024-01-03 DIAGNOSIS — M75.42 SUBACROMIAL IMPINGEMENT OF LEFT SHOULDER: Primary | ICD-10-CM

## 2024-01-03 DIAGNOSIS — N39.42 URINARY INCONTINENCE WITHOUT SENSORY AWARENESS: Primary | ICD-10-CM

## 2024-01-03 DIAGNOSIS — M25.50 MULTIPLE JOINT PAIN: ICD-10-CM

## 2024-01-03 LAB
APPEARANCE: CLEAR
BILIRUBIN: NEGATIVE
GLUCOSE (URINE DIPSTICK): NEGATIVE MG/DL
LEUKOCYTES: NEGATIVE
MULTISTIX LOT#: NORMAL NUMERIC
NITRITE, URINE: NEGATIVE
OCCULT BLOOD: NEGATIVE
PH, URINE: 5 (ref 4.5–8)
PROTEIN (URINE DIPSTICK): NEGATIVE MG/DL
SPECIFIC GRAVITY: 1.03 (ref 1–1.03)
URINE-COLOR: YELLOW
UROBILINOGEN,SEMI-QN: 0.2 MG/DL (ref 0–1.9)

## 2024-01-03 PROCEDURE — 3008F BODY MASS INDEX DOCD: CPT | Performed by: PHYSICAL MEDICINE & REHABILITATION

## 2024-01-03 PROCEDURE — 81003 URINALYSIS AUTO W/O SCOPE: CPT | Performed by: PHYSICIAN ASSISTANT

## 2024-01-03 PROCEDURE — 99214 OFFICE O/P EST MOD 30 MIN: CPT | Performed by: PHYSICIAN ASSISTANT

## 2024-01-03 PROCEDURE — 99213 OFFICE O/P EST LOW 20 MIN: CPT | Performed by: PHYSICAL MEDICINE & REHABILITATION

## 2024-01-03 RX ORDER — CELECOXIB 200 MG/1
200 CAPSULE ORAL DAILY
Qty: 7 CAPSULE | Refills: 0 | Status: SHIPPED | OUTPATIENT
Start: 2024-01-03

## 2024-01-03 NOTE — PROGRESS NOTES
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      Patient was seen by Dr. Toledo in March 2023 by Dr. Toledo for consult for same. Was started on myrbetriq without any significant benefit noted. His leakage was improved with better regulation of his bowels. He does have issues with constipation and is being followed by gastroenterology for this. He drinks minimal caffeine. Has multiple medical issues, rotator cuff tears with minimal activity. Pelvic fracture without injury/trauma. Being treated for osteoporosis. Recent labs showed elevated FSH/LH and high normal testosterone with normal free.     He has a strong urinary stream but occasionally has urinary hesitancy.  He denies gross hematuria.  He does have urinary frequency, occasional urgency and urge incontinence, as well as some incontinence after voiding. He recently had an episode when he had muscle spasms that he experienced large volume incontinence. He also was told to sit when he voided and he noticed that after he voids upon standing would void again without any awareness.     Post void residuals previously 2mL and 0 mL  Prior renal ultrasound 2021 negative hydronephrosis.     Urinalysis: Negative     PSA:        Lab Results   Component Value Date     QPSA 0.36 04/03/2023      History/Other:    Chief Complaint Reviewed and Verified  No Further Nursing Notes to   Review         Current Outpatient Medications   Medication Sig Dispense Refill    DULoxetine 20 MG Oral Cap DR Particles Take 2 capsules (40 mg total) by mouth daily. 180 capsule 2    cephalexin 500 MG Oral Cap Take 1 capsule (500 mg total) by mouth 3 (three) times daily. 90 capsule 5    Teriparatide, Recombinant, (FORTEO) 600 MCG/2.4ML Subcutaneous Solution Pen-injector Inject  20 mcg into the skin daily.      MELOXICAM 7.5 MG Oral Tab TAKE 1 TABLET BY MOUTH EVERY DAY 30 tablet 0    glycopyrrolate 1 MG Oral Tab Take 1 tablet (1 mg total) by mouth 3 (three) times daily. 135 tablet 1    gabapentin 400 MG Oral Cap Take 1 capsule (400 mg total) by mouth 2 (two) times daily. 60 capsule 5    triamcinolone acetonide 40 MG/ML Injection Suspension Inject 1 mL (40 mg total) into the articular space.      Aluminum Chloride (DRYSOL) 20 % External Solution Apply 1 Application topically nightly. 75 mL 0    cyclobenzaprine 5 MG Oral Tab Take 1 tablet (5 mg total) by mouth 3 (three) times daily as needed.      alendronate 70 MG Oral Tab Take 1 tablet (70 mg total) by mouth every 7 days. 12 tablet 1    XIIDRA 5 % Ophthalmic Solution       baclofen 10 MG Oral Tab Take 1 tablet (10 mg total) by mouth 3 (three) times daily.         Review of Systems:  Pertinent items are noted in HPI.      Objective:   There were no vitals taken for this visit. Estimated body mass index is 24.68 kg/m² as calculated from the following:    Height as of 11/22/23: 6' 2\" (1.88 m).    Weight as of 11/22/23: 192 lb 3.2 oz (87.2 kg).    No distress  Non labored respirations      Laboratory Data:  Lab Results   Component Value Date    WBC 6.7 09/20/2023    HGB 13.6 09/20/2023     09/20/2023     Lab Results   Component Value Date     09/20/2023    K 4.5 09/20/2023     09/20/2023    CO2 30 09/20/2023    BUN 19 09/20/2023    GLU 93 09/20/2023    GFRAA 131 01/28/2022    AST 27 09/20/2023    ALT 34 09/20/2023    TP 7.2 09/20/2023    ALB 4.5 09/20/2023    PHOS 3.5 03/12/2021    CA 9.9 09/20/2023    MG 2.2 03/03/2022       Urinalysis Results (last three years):  Recent Labs     01/28/22  0849 03/21/23  1413 05/22/23  1310   COLORUR Yellow  --   --    CLARITY Clear  --   --    SPECGRAVITY 1.025 1.025 1.030   PHURINE 5.0 5.0 5.5   PROUR Negative  --   --    GLUUR Negative  --   --    KETUR 20*  --   --    BILUR Negative  --    --    BLOODURINE Negative  --   --    NITRITE Negative Negative Negative   UROBILINOGEN <2.0  --   --    LEUUR Negative  --   --        Urine Culture Results (last three years):  No results found for: \"URINECUL\"    Imaging  XR FOOT WEIGHTBEARING (3 VIEWS), RIGHT   (CPT=73630)    Result Date: 12/27/2023  PROCEDURE: XR FOOT WEIGHTBEARING (3 VIEWS), RIGHT (CPT=73630)  COMPARISON: External Exams, MR, ANKLE^ROUTINE, 10/21/2023, 10:22 AM.  INDICATIONS: Pt here for follow up to right heel fracture x 3 months. Hx: osteoporosis and Carla's deformity  TECHNIQUE: Three views FINDINGS: Normal alignment.  No acute or healing fracture.  Tiny posterior calcaneal enthesophyte         CONCLUSION: No fracture    Dictated by (CST): Gopi Meek MD on 12/27/2023 at 4:42 PM     Finalized by (CST): Gopi Meek MD on 12/27/2023 at 4:43 PM            Assessment & Plan:   Urinary incontinence  Multifactorial, neurologic and constipation being primary factors.  Would recommend urodynamics to assess for DSD.  Check renal ultrasound.     Follow up with Dr. Toledo to review UDS. Discussed with patient possible transfer for  care to [facility] pending results (all other speciality care at [facility]).    The above impression and plan were discussed in detail with the patient who verbalized understanding and all questions were answered.  A total of >30 minutes were spent on the visit including chart review in preparation for the visit, time with the patient, placing orders and communication with other providers where appropriate.      Angie English PA-C, 1/3/2024

## 2024-01-03 NOTE — PATIENT INSTRUCTIONS
Consider taking glucosamine/chondroitin sulfate supplements regularly for the next 3 months if it does not give you side effects and if affordable.     Stop the meloxicam while taking the celebrex. Take the celebrex with food.     Come see me in 4-6 weeks if the shoulder continues to bother you.

## 2024-01-04 NOTE — PROGRESS NOTES
Progress note    C/C:   Chief Complaint   Patient presents with    Shoulder Pain     LOV 11/10/23 Pt is here with complaints of  left shoulder pain. No N/T. Pain started a week ago. No injury occurred . Takes meloxicam daily to ease the pain. No imaging has been done. Pain 5/10      HPI: 48 year old male presents for follow up. Since the last time I saw him he was formally diagnosed with osteoporosis, and is now on forteo for treatment through the [facility]. He developed left lateral shoulder pain without inciting event about 1-2 weeks ago; localizes lateral shoulder and upper arm pain that can worsen at night time and with overhead activity. Has been in physical therapy; his physical therapist told him he probably has a rotator cuff problem. He does have some difficulty with internal rotation.     Pertinent allergies:   Allergies   Allergen Reactions    Tegretol [Carbamazepine] OTHER (SEE COMMENTS)     Burn type inflammation on skin.         Physical exam:  Pulse 73   Ht 74\"   Wt 192 lb (87.1 kg)   SpO2 96%   BMI 24.65 kg/m²      PE left shoulder exam:    Range of motion:  Forward flexion: 160 degrees, end range pain  Abduction: 160 degrees, end range pain  Internal rotation: T7, pain  External rotation: no restrictionto PROM    Provocative test:  Supraspinatus test: negative  Hawkin's test: negative  Neer's test: +    Imaging: No new imaging to review    Assessment and plan  Left subacromial impingement  Multiple joint pain  H/o osteoporosis, on forteo    He has had multiple joint and tendon issues over the past 1-2 years - a right nondisplaced ramus fracture, chronic bilateral knee pain, partial right rotator cuff tear, just to name a few. Steroid injections or oral corticosteroids by mouth should be avoided where possible. I recommend he continue physical therapy and take celebrex 200mg qDaily for 7 days. If symptoms persist we can then consider steroid injection under US guidance.     For  overall joint health I recommend he take glucosamine/chondroitin sulfate for the next 3 months, as there is (admittedly weak) evidence that it might help for joint pain and joint health. As he is relatively young and as the risks are virtually none it might be worthwhile for him to try for the longevity of his joints.     Wiley Escalante DO  Physical Medicine and Rehabilitation  [facility]

## 2024-01-08 ENCOUNTER — TELEPHONE (OUTPATIENT)
Dept: NEUROLOGY | Facility: CLINIC | Age: 49
End: 2024-01-08

## 2024-01-16 ENCOUNTER — TELEPHONE (OUTPATIENT)
Dept: ORTHOPEDICS CLINIC | Facility: CLINIC | Age: 49
End: 2024-01-16

## 2024-01-16 DIAGNOSIS — M25.512 LEFT SHOULDER PAIN, UNSPECIFIED CHRONICITY: Primary | ICD-10-CM

## 2024-01-16 DIAGNOSIS — M79.651 RIGHT THIGH PAIN: ICD-10-CM

## 2024-01-16 DIAGNOSIS — R10.31 RIGHT GROIN PAIN: ICD-10-CM

## 2024-01-16 RX ORDER — MELOXICAM 7.5 MG/1
7.5 TABLET ORAL DAILY
Qty: 30 TABLET | Refills: 0 | OUTPATIENT
Start: 2024-01-16

## 2024-01-16 NOTE — TELEPHONE ENCOUNTER
Pt made appt online for left shoulder pain. No imaging in epic. Please advise if xrays are needed.     Thanks!    Future Appointments   Date Time Provider Department Center   1/22/2024  1:45 PM PF US RM3 PF US [place]   1/30/2024 11:00 AM [id number] UROLOGY NURSE [id number] EC Nap 4   1/30/2024 12:15 PM Carlos Toledo MD [id number] EC Nap 4   2/2/2024  9:40 AM Nohelia Moody MD [facility] ORTHO [id number]   2/26/2024  9:40 AM Christie Valencia APRN SGINP [facility] SUB GI   3/26/2024 10:00 AM Pablito Knight MD EM[facility] [address] [facility] [address]   6/26/2024  7:30 AM Sky Mendoza MD G[facility]B DERM [facility]

## 2024-01-22 ENCOUNTER — HOSPITAL ENCOUNTER (OUTPATIENT)
Dept: ULTRASOUND IMAGING | Age: 49
Discharge: HOME | End: 2024-01-22
Attending: PHYSICIAN ASSISTANT
Payer: COMMERCIAL

## 2024-01-22 DIAGNOSIS — N39.42 URINARY INCONTINENCE WITHOUT SENSORY AWARENESS: ICD-10-CM

## 2024-01-22 PROCEDURE — 76775 US EXAM ABDO BACK WALL LIM: CPT | Performed by: PHYSICIAN ASSISTANT

## 2024-01-25 ENCOUNTER — PATIENT MESSAGE (OUTPATIENT)
Dept: ORTHOPEDICS CLINIC | Facility: CLINIC | Age: 49
End: 2024-01-25

## 2024-01-25 ENCOUNTER — PATIENT MESSAGE (OUTPATIENT)
Dept: PHYSICAL MEDICINE AND REHAB | Facility: CLINIC | Age: 49
End: 2024-01-25

## 2024-01-25 NOTE — TELEPHONE ENCOUNTER
From: Arron Gutierrez  To: Nohelia Moody  Sent: 1/25/2024 3:17 PM CST  Subject: Knee pain    I believe I have an X-ray on file? Do I have arthritis in knee as well? Terrible pain.

## 2024-01-26 ENCOUNTER — PATIENT MESSAGE (OUTPATIENT)
Dept: ORTHOPEDICS CLINIC | Facility: CLINIC | Age: 49
End: 2024-01-26

## 2024-01-29 NOTE — TELEPHONE ENCOUNTER
From: Arron Gutierrez  To: Nohelia Moody  Sent: 1/26/2024 4:16 PM CST  Subject: Back pain    My back has been out for two weeks. I wouldn’t worry normally but everything else I have going on and read I could have another torn tendon. I can x-ray on 2/2 when I’m scheduled to seee you. Let me know. Thanks

## 2024-01-29 NOTE — TELEPHONE ENCOUNTER
From: Arron Gutierrez  To: Wiley Escalante  Sent: 1/25/2024 3:15 PM CST  Subject: Pain killer    Did you send to [facility]? I haven’t heard anything yet?

## 2024-01-30 ENCOUNTER — OFFICE VISIT (OUTPATIENT)
Dept: SURGERY | Facility: CLINIC | Age: 49
End: 2024-01-30
Payer: COMMERCIAL

## 2024-01-30 ENCOUNTER — NURSE ONLY (OUTPATIENT)
Dept: SURGERY | Facility: CLINIC | Age: 49
End: 2024-01-30
Payer: COMMERCIAL

## 2024-01-30 DIAGNOSIS — R39.9 LOWER URINARY TRACT SYMPTOMS (LUTS): Primary | ICD-10-CM

## 2024-01-30 DIAGNOSIS — R82.90 URINE FINDING: Primary | ICD-10-CM

## 2024-01-30 LAB
APPEARANCE: CLEAR
BILIRUBIN: NEGATIVE
GLUCOSE (URINE DIPSTICK): NEGATIVE MG/DL
KETONES (URINE DIPSTICK): NEGATIVE MG/DL
LEUKOCYTES: NEGATIVE
MULTISTIX LOT#: NORMAL NUMERIC
NITRITE, URINE: NEGATIVE
OCCULT BLOOD: NEGATIVE
PH, URINE: 5.5 (ref 4.5–8)
PROTEIN (URINE DIPSTICK): NEGATIVE MG/DL
SPECIFIC GRAVITY: 1.02 (ref 1–1.03)
URINE-COLOR: YELLOW
UROBILINOGEN,SEMI-QN: 0.2 MG/DL (ref 0–1.9)

## 2024-01-30 PROCEDURE — 51797 INTRAABDOMINAL PRESSURE TEST: CPT | Performed by: SURGERY

## 2024-01-30 PROCEDURE — 51728 CYSTOMETROGRAM W/VP: CPT | Performed by: SURGERY

## 2024-01-30 PROCEDURE — 51784 ANAL/URINARY MUSCLE STUDY: CPT | Performed by: SURGERY

## 2024-01-30 PROCEDURE — 81003 URINALYSIS AUTO W/O SCOPE: CPT | Performed by: SURGERY

## 2024-01-30 PROCEDURE — 99213 OFFICE O/P EST LOW 20 MIN: CPT | Performed by: SURGERY

## 2024-01-30 PROCEDURE — 51741 ELECTRO-UROFLOWMETRY FIRST: CPT | Performed by: SURGERY

## 2024-01-30 RX ORDER — TAMSULOSIN HYDROCHLORIDE 0.4 MG/1
0.4 CAPSULE ORAL EVERY EVENING
Qty: 90 CAPSULE | Refills: 6 | Status: SHIPPED | OUTPATIENT
Start: 2024-01-30
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
## 2024-01-30 NOTE — PROCEDURES
Urodynamics Procedure Note    INDICATIONS:   Arron Gutierrez is a 48 year old male with history of right temporal ganglioglioma status post craniotomy and resection in , cerebellar atrophy, seizure disorder, myomalacia, osteoporosis, who presents today for follow up urinary incontinence.      I saw him several months ago and at that time he was endorsing strong urinary stream but occasional urinary hesitancy with associated urinary frequency, urgency, urge incontinence.  I started him on mirabegron and he felt that this did not really make a difference in his symptoms.  His symptoms improved after improving his constipation and starting pelvic floor PT.     Recent labs showed elevated FSH/LH and high normal testosterone (757).     Recently he was seen by BARBI Fontenot on 1/3/2024 with persistent symptoms, mainly when he sits to void and feels that he empties well after he stands up he voids another small amounts and wets his pants with the floor.  He occasionally has urinary hesitancy, but this is mainly in the morning only.  He still does have some constipation but it is improving.  Renal ultrasound was ordered that showed bilateral small simple renal cysts, no hydronephrosis.  He returns for urodynamics today.    PROCEDURE - Urodynamics:  Complex cystometrogram with voiding pressure studies (05581)  Complex uroflowmetry (71428)  Intra-abdominal pressure measurement (87835)  Electromyography of urethral sphincter (72719)    DATE OF PROCEDURE: 2024     PRE-PROCEDURE DIAGNOSIS: Urinary incontinence    POST-PROCEDURE DIAGNOSIS: Same     SURGEON: Carlos Toledo MD    PROCEDURE:   Patient was brought to the procedure suite and an time-out was performed identifiying the patient,  and procedure being performed. The risks of the procedure were detailed to the patient including minimal bleeding, infection, dysuria. The patient agreed to proceed.     Patient was placed on the table in a supine position  for catheter and EMG placement. The EMG patches were placed. Next, the rectal catheter was inserted 10-15 cm and secured it to the leg. The patient was straight catheterized to empty the bladder. The bladder catheter was introduced 20-25 cm for this male patient and secured this to the penis. The transducers were all in the open position and attached to the catheters.    The system was \"zeroed.\" Once satisfied with catheter placement, the system was equalized and the study was started.     Catheter placement was checked by having the patient cough and noting a spike in Pves and Pabd but not Pdet. The pump was started which was running normal saline.      FINDINGS:    Cystometrogram:    Baseline Pdet 7 cmH2O    First sensation 402 mL  First urge 553 mL  Strong urge 607 mL    Cystometric capacity 607 mL    Compliance: Normal    Overactivity: None    Pressure/Flow Studies:    Pdet max 59  Pdet @ Qmax 19  Qmax 14  BOOi = Pdet@Qmax - 2Qmax = -9    Comments: No DSD observed, sphincter relaxes appropriately during voiding.     Initially voided ~500 mL, then voided another 100 mL after standing.    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      I saw him several months ago and at that time he was endorsing strong urinary stream but occasional urinary hesitancy with associated urinary frequency, urgency, urge incontinence.  I started him on mirabegron and he felt that this did not really make a difference in his symptoms.  His symptoms improved after improving his constipation and starting pelvic floor PT.     Recent labs showed elevated FSH/LH and high normal testosterone (757).     Recently he was seen by BARBI Fontenot on 1/3/2024 with persistent symptoms, mainly when he sits to void and feels  that he empties well after he stands up he voids another small amounts and wets his pants with the floor.  He occasionally has urinary hesitancy, but this is mainly in the morning only.  He still does have some constipation but it is improving.  Renal ultrasound was ordered that showed bilateral small simple renal cysts, no hydronephrosis.  He returns for urodynamics today.    Urodynamics today shows normal compliance, good bladder capacity, no bladder overactivity, no obstructive parameters, no DSD.  He did demonstrate his usual voiding pattern by emptying 500 mL and then another 100 mL shortly after standing.    PLAN:   -Continue to work on constipation  -Start tamsulosin 0.4 mg daily given mild urinary hesitancy in the morning to see if this helps  -Emphasized double voiding and timed voiding    Carlos Toledo MD  Staff Urologist  [facility]  Office: [phone]

## 2024-02-02 ENCOUNTER — HOSPITAL ENCOUNTER (OUTPATIENT)
Dept: GENERAL RADIOLOGY | Age: 49
Discharge: HOME | End: 2024-02-02
Attending: ORTHOPAEDIC SURGERY
Payer: COMMERCIAL

## 2024-02-02 ENCOUNTER — OFFICE VISIT (OUTPATIENT)
Dept: ORTHOPEDICS CLINIC | Facility: CLINIC | Age: 49
End: 2024-02-02
Payer: COMMERCIAL

## 2024-02-02 DIAGNOSIS — M75.82 TENDINITIS OF LEFT ROTATOR CUFF: Primary | ICD-10-CM

## 2024-02-02 DIAGNOSIS — M75.02 ADHESIVE CAPSULITIS OF LEFT SHOULDER: ICD-10-CM

## 2024-02-02 DIAGNOSIS — M25.512 LEFT SHOULDER PAIN, UNSPECIFIED CHRONICITY: ICD-10-CM

## 2024-02-02 PROCEDURE — 73030 X-RAY EXAM OF SHOULDER: CPT | Performed by: ORTHOPAEDIC SURGERY

## 2024-02-02 PROCEDURE — 99214 OFFICE O/P EST MOD 30 MIN: CPT | Performed by: ORTHOPAEDIC SURGERY

## 2024-02-02 RX ORDER — CELECOXIB 200 MG/1
200 CAPSULE ORAL 2 TIMES DAILY
Qty: 60 CAPSULE | Refills: 0 | Status: SHIPPED | OUTPATIENT
Start: 2024-02-02 | End: 2024-03-03
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
## 2024-02-20 ENCOUNTER — PATIENT MESSAGE (OUTPATIENT)
Dept: ORTHOPEDICS CLINIC | Facility: CLINIC | Age: 49
End: 2024-02-20

## 2024-02-21 NOTE — TELEPHONE ENCOUNTER
From: Arron Gutierrez  To: Julita Cody  Sent: 2/20/2024 5:55 PM CST  Subject: Toe gripping    I figured out I’m doing this. Causing blisters and losing toe nails. Guessing I’m making up for my loss of balance. Is it anything to worry about? I’ll attempt to stop.

## 2024-02-22 ENCOUNTER — PATIENT MESSAGE (OUTPATIENT)
Dept: SURGERY | Facility: CLINIC | Age: 49
End: 2024-02-22

## 2024-02-23 ENCOUNTER — PATIENT MESSAGE (OUTPATIENT)
Dept: FAMILY MEDICINE CLINIC | Facility: CLINIC | Age: 49
End: 2024-02-23

## 2024-02-23 NOTE — TELEPHONE ENCOUNTER
From: Arron Gutierrez  To: Pablito Knight  Sent: 2/23/2024 9:24 AM CST  Subject: Cat allergies?    My step daughter moved home with 2 cats. I’ve had congestion problems and getting red bumps on my face since then. Is there a test you administer?

## 2024-02-23 NOTE — TELEPHONE ENCOUNTER
From: Arron Gutierrez  To: Carlos Toledo  Sent: 2/22/2024 4:55 PM CST  Subject: Bloodwork     My endocrinologist told me to ask you about the attached level. Please advise. Thanks

## 2024-02-26 NOTE — TELEPHONE ENCOUNTER
S/w pt and he states last time he was seen he had a blister on right foot and Dr Cody thought maybe it was related to fx etc. He states when he walks he thinks his toes are gripping the floor and he is putting more pressure on the toes which has causes toenail issues and blisters. He states he saw endocrinologist on 2/21 at [facility] who tx his osteoporosis who agreed with this but states did not evaluate his foot/blisters.  He had a blister on the left foot but popped it. He had a blister on right foot that looks red and swollen around the area, but denies any drainage or warmth. He will send a picture through [employer]. I advised if he has signs of infection he should get evaluated at . Will update Dr Cody

## 2024-02-27 DIAGNOSIS — R39.9 LOWER URINARY TRACT SYMPTOMS (LUTS): Primary | ICD-10-CM

## 2024-02-27 NOTE — TELEPHONE ENCOUNTER
He should send a pic and then consider coming in for evaluation. Depending on  where he gets the blisters, there may be something surgically to do to try to correct this. The repeat blisters become a problem when they get infected so monitoring his feet daily is a good idea.

## 2024-03-02 LAB
FSH: 42.2 MIU/ML (ref 1.4–12.8)
LH: 17.8 MIU/ML (ref 1.5–9.3)

## 2024-03-04 RX ORDER — CELECOXIB 200 MG/1
200 CAPSULE ORAL 2 TIMES DAILY
Qty: 60 CAPSULE | Refills: 0 | Status: SHIPPED | OUTPATIENT
Start: 2024-03-04

## 2024-03-04 NOTE — TELEPHONE ENCOUNTER
Celebrex  DOS: N/A  Last OV: 02/02/24  Last refill date: 02/02/24     #/refills: 60/0  Upcoming appt:   Future Appointments   Date Time Provider Department Center   3/12/2024  2:00 PM PF CT RM1 PF CT [place]   3/26/2024 10:00 AM Pablito Knight MD [facility] [address] [facility] [address]   4/30/2024 11:45 AM Carlos Toledo MD [id number] [place] [address]   6/26/2024  7:30 AM Sky Mendoza MD [facility] [place]     02/24/24  Blood Urea Nitrogen  7 - 20 mg/dL 24 High    Creatinine  0.50 - 1.40 mg/dL 0.85   eGFR Estimate, All  >=90 mL/min/1.73 sq. m 107      Ambulatory

## 2024-03-09 ENCOUNTER — PATIENT MESSAGE (OUTPATIENT)
Dept: PHYSICAL MEDICINE AND REHAB | Facility: CLINIC | Age: 49
End: 2024-03-09

## 2024-03-12 NOTE — TELEPHONE ENCOUNTER
From: Arron Gutierrez  To: Wiley Escalante  Sent: 3/9/2024 7:59 AM CST  Subject: Pubic Ramus pain     I recently started walking again after 3 months off my feet. I noticed a sharp pain in right buttocks at [employer] last weekend. It has continued while sitting. Let me know if I should ignore or if you want to check it out. Thanks

## 2024-03-13 ENCOUNTER — PATIENT MESSAGE (OUTPATIENT)
Dept: ORTHOPEDICS CLINIC | Facility: CLINIC | Age: 49
End: 2024-03-13

## 2024-03-14 ENCOUNTER — OFFICE VISIT (OUTPATIENT)
Dept: ORTHOPEDICS CLINIC | Facility: CLINIC | Age: 49
End: 2024-03-14
Payer: COMMERCIAL

## 2024-03-14 VITALS — BODY MASS INDEX: 25.94 KG/M2 | WEIGHT: 200 LBS | HEIGHT: 73.5 IN

## 2024-03-14 DIAGNOSIS — B35.1 ONYCHOMYCOSIS: ICD-10-CM

## 2024-03-14 DIAGNOSIS — M20.42 HAMMER TOES OF BOTH FEET: ICD-10-CM

## 2024-03-14 DIAGNOSIS — M81.0 AGE-RELATED OSTEOPOROSIS WITHOUT CURRENT PATHOLOGICAL FRACTURE: Primary | ICD-10-CM

## 2024-03-14 DIAGNOSIS — M20.41 HAMMER TOES OF BOTH FEET: ICD-10-CM

## 2024-03-14 DIAGNOSIS — S90.426A BLISTER OF TOE, UNSPECIFIED LATERALITY, INITIAL ENCOUNTER: ICD-10-CM

## 2024-03-14 DIAGNOSIS — M20.5X9 HALLUX LIMITUS, UNSPECIFIED LATERALITY: ICD-10-CM

## 2024-03-14 PROCEDURE — 99214 OFFICE O/P EST MOD 30 MIN: CPT | Performed by: PODIATRIST

## 2024-03-14 NOTE — PROGRESS NOTES
[facility] Progress Note    Subjective:   Arron is here for long term follow up  Issues with toes and blisters  Phenytoin use for years-    Had seen Dr Boyd for calcaneal fracture  Has since been dx with osteoporosis from medication chronic use  Hx of blister for 2 years    Had issues from [facility] about orthotics-had adjustments and big delays  Blisters persisted so he dc use and sent them back  Patches back of heels help-overall achilles and haglunds heel issues better bilaterally    Objective:     Long 2nd toes - distal tip   Gripping from balance    First mpj right - mild hallux limitus      Imaging: no new imaging  Xrays from 12/24 mild hallux limitus        Assessment/Plan:     Diagnoses and all orders for this visit:    Age-related osteoporosis without current pathological fracture  -     Physical Therapy Referral - External    Hammer toes of both feet  -     Physical Therapy Referral - External    Blister of toe, unspecified laterality, initial encounter  -     Physical Therapy Referral - External    Onychomycosis    Hallux limitus, unspecified laterality        Lubricating toes for friction  Surface of shoes - heel and toes  Sock types-this was all discussed with Arron    Could have 2nd toe nails removed-or filed down  Could have toe shortened-if gripping and blisters persist    Underlying problem - balance  Could do PT for balance, LE strengthening and gait work-order put in - [facility]  With his gripping issues in mind          Toshia Thomson DPM  [facility]    The [employer] speech recognition software was used to prepare this note. If a word or phrase is confusing, it is likely do to a failure of recognition. Please contact me with any questions or clarifications.

## 2024-03-15 RX ORDER — GLYCOPYRROLATE 1 MG/1
1 TABLET ORAL 3 TIMES DAILY
Qty: 270 TABLET | Refills: 3 | Status: SHIPPED | OUTPATIENT
Start: 2024-03-15

## 2024-03-15 NOTE — TELEPHONE ENCOUNTER
Requested Prescriptions     Pending Prescriptions Disp Refills    GLYCOPYRROLATE 1 MG Oral Tab [Pharmacy Med Name: GLYCOPYRROLATE 1 MG TABLET] 270 tablet 0     Sig: TAKE 1 TABLET BY MOUTH 3 TIMES DAILY.       FILLED- 12/14/23  LOV- 8/28/23    No f/u scheduled

## 2024-03-26 ENCOUNTER — OFFICE VISIT (OUTPATIENT)
Dept: FAMILY MEDICINE CLINIC | Facility: CLINIC | Age: 49
End: 2024-03-26
Payer: COMMERCIAL

## 2024-03-26 VITALS
RESPIRATION RATE: 16 BRPM | WEIGHT: 194 LBS | BODY MASS INDEX: 25.17 KG/M2 | SYSTOLIC BLOOD PRESSURE: 116 MMHG | DIASTOLIC BLOOD PRESSURE: 70 MMHG | HEART RATE: 80 BPM | HEIGHT: 73.5 IN | TEMPERATURE: 98 F

## 2024-03-26 DIAGNOSIS — M81.8 OTHER OSTEOPOROSIS WITHOUT CURRENT PATHOLOGICAL FRACTURE: ICD-10-CM

## 2024-03-26 DIAGNOSIS — G62.9 NEUROPATHY: ICD-10-CM

## 2024-03-26 DIAGNOSIS — M54.50 ACUTE LEFT-SIDED LOW BACK PAIN WITHOUT SCIATICA: Primary | ICD-10-CM

## 2024-03-26 DIAGNOSIS — L29.9 ITCHING: ICD-10-CM

## 2024-03-26 PROCEDURE — 99214 OFFICE O/P EST MOD 30 MIN: CPT | Performed by: FAMILY MEDICINE

## 2024-03-26 RX ORDER — AZELASTINE 1 MG/ML
1 SPRAY, METERED NASAL 2 TIMES DAILY
COMMUNITY
Start: 2024-03-15

## 2024-03-26 RX ORDER — BUDESONIDE 0.5 MG/2ML
0.5 INHALANT ORAL 2 TIMES DAILY
COMMUNITY
Start: 2024-03-15

## 2024-03-26 RX ORDER — LEVOCETIRIZINE DIHYDROCHLORIDE 5 MG/1
5 TABLET, FILM COATED ORAL NIGHTLY PRN
COMMUNITY
Start: 2024-03-15

## 2024-03-26 NOTE — PROGRESS NOTES
Subjective:   Patient ID: Arron Gutierrez is a 48 year old male.    HPI   Mr. 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  He has been following up with his specialist.  Recently had bone density test done which shows osteoporosis.  This had progressed from osteopenia previously.  He was started on Forteo.  He has been experiencing back pain mostly on the left lumbar region.  He continues to do physical therapy and exercises.  He had gained some weight by exercising.  He continues to drool excessively.  He continues to have symptoms which include memory disturbance, weakness and numbness in his lower extremities.  Occasional constipation which seems to be relieved by psyllium husk. I had reviewed past medical and family histories together with allergy and medication lists documented.    History/Other:   Review of Systems   Constitutional:  Negative for fatigue and fever.   HENT:  Positive for rhinorrhea. Negative for sore throat and trouble swallowing.         Drooling   Respiratory:  Negative for cough and shortness of breath.    Cardiovascular:  Negative for chest pain and palpitations.   Gastrointestinal:  Negative for abdominal pain, constipation, diarrhea, nausea and vomiting.   Genitourinary:         Incontinence   Musculoskeletal:  Positive for arthralgias and back pain.   Neurological:  Positive for weakness and numbness. Negative for dizziness and headaches.     Current Outpatient Medications   Medication Sig Dispense Refill    azelastine 0.1 % Nasal Solution 1 spray by Nasal route 2 (two) times daily.      budesonide 0.5 MG/2ML Inhalation Suspension Inhale 2 mL (0.5 mg total) into the lungs 2 (two) times daily.      levocetirizine 5 MG Oral Tab Take 1 tablet (5 mg total) by mouth nightly as needed.      glycopyrrolate 1 MG Oral Tab TAKE 1 TABLET BY MOUTH 3 TIMES DAILY. 270 tablet 3    celecoxib 200 MG  Oral Cap Take 1 capsule (200 mg total) by mouth 2 (two) times daily. 60 capsule 0    tamsulosin 0.4 MG Oral Cap Take 1 capsule (0.4 mg total) by mouth every evening. 90 capsule 6    DULoxetine 20 MG Oral Cap DR Particles Take 2 capsules (40 mg total) by mouth daily. 180 capsule 2    cephalexin 500 MG Oral Cap Take 1 capsule (500 mg total) by mouth 3 (three) times daily. (Patient taking differently: Take 1 capsule (500 mg total) by mouth daily.) 90 capsule 5    Teriparatide, Recombinant, (FORTEO) 600 MCG/2.4ML Subcutaneous Solution Pen-injector Inject 20 mcg into the skin daily.      gabapentin 400 MG Oral Cap Take 1 capsule (400 mg total) by mouth 2 (two) times daily. 60 capsule 5    triamcinolone acetonide 40 MG/ML Injection Suspension Inject 1 mL (40 mg total) into the articular space.      Aluminum Chloride (DRYSOL) 20 % External Solution Apply 1 Application topically nightly. 75 mL 0    cyclobenzaprine 5 MG Oral Tab Take 1 tablet (5 mg total) by mouth 3 (three) times daily as needed.      XIIDRA 5 % Ophthalmic Solution       baclofen 10 MG Oral Tab Take 1 tablet (10 mg total) by mouth 3 (three) times daily.       Allergies:  Allergies   Allergen Reactions    Tegretol [Carbamazepine] OTHER (SEE COMMENTS)     Burn type inflammation on skin.        Objective:   Physical Exam  Vitals reviewed.   Constitutional:       General: He is not in acute distress.  HENT:      Mouth/Throat:      Mouth: Mucous membranes are moist.      Pharynx: Oropharynx is clear.   Eyes:      General: No scleral icterus.     Conjunctiva/sclera: Conjunctivae normal.   Cardiovascular:      Rate and Rhythm: Normal rate and regular rhythm.      Heart sounds: Normal heart sounds. No murmur heard.  Pulmonary:      Effort: Pulmonary effort is normal. No respiratory distress.      Breath sounds: Normal breath sounds. No wheezing or rales.   Abdominal:      General: Bowel sounds are normal. There is no distension.      Palpations: Abdomen is soft.  There is no mass.      Tenderness: There is no abdominal tenderness.   Musculoskeletal:      Cervical back: Neck supple.      Lumbar back: Tenderness present. No swelling, edema, deformity, spasms or bony tenderness.        Back:       Right lower leg: No edema.      Left lower leg: No edema.   Lymphadenopathy:      Cervical: No cervical adenopathy.   Skin:     General: Skin is warm.   Neurological:      Mental Status: He is alert.   Psychiatric:         Mood and Affect: Mood normal.         Behavior: Behavior normal.         Assessment & Plan:   1. Acute left-sided low back pain without sciatica   -Likely musculoskeletal in nature but given the fact that he has underlying osteoporosis secondary to Dilantin toxicity I will need to rule out fracture and will order lumbar x-ray today.  May take Celebrex as needed.   2. Other osteoporosis without current pathological fracture   -Continue follow-up with endocrinology and continue Forteo  - Continue with current exercise regimen   3. Neuropathy   -Continue follow-up with neurology   4. Itching   -Will refer to dermatology for further evaluation management     Follow-up in 6 months or as needed      This note was prepared using Dragon Medical voice recognition dictation software. As a result errors may occur. When identified these errors have been corrected. While every attempt is made to correct errors during dictation discrepancies may still exist          No orders of the defined types were placed in this encounter.      Meds This Visit:  Requested Prescriptions      No prescriptions requested or ordered in this encounter       Imaging & Referrals:  DERM - INTERNAL  XR LUMBAR SPINE (MIN 4 VIEWS) (CPT=72110)

## 2024-03-26 NOTE — PATIENT INSTRUCTIONS
Thank you for choosing Pablito Knight MD at [facility]  To Do: Arron Jared Gutierrez  1. Please see below  [facility] is located in [address].  Monday, Tuesday & Friday - 8 a.m. to 4 p.m.  Wednesday, Thursday - 7 a.m. to 3 p.m.  The lab is closed daily from 12 p.m.-12:30 p.m.  Saturday lab hours by appointment. Call [phone] to schedule the appointment.   Please signup for [facility], which is electronic access to your record if you have not done so.  All your results will post on there.  [email or web address]   You can NOW use [facility] to book your appointments with us, or consider using open access scheduling which is through the [place] website [email or web address] and type in Pablito Knight MD and follow the links for \"Schedule Online Now\"    To schedule Imaging or tests at [place] call Central Scheduling [phone], Go to [facility] ER Building (For example: CT scans, X rays, Ultrasound, MRI)  Cardiac Testing in ER building Building A second floor Cardiac Testing [phone] (For example: Holter Monitor, Cardiac Stress tests,Event Monitor, or 2D Echocardiograms)  [facility] call [phone] usually in [facility]  Walk in Clinic in [place] at [address] Mon-Fri at 8am-7:30 p.m., and Sat/Sun 9:00a.m.-4:30 p.m.  Also at [address] [place] [address]  Call [phone] for info     Please call our office about any questions regarding your treatment/medicines/tests as a result of today's visit.  For your safety, read the entire package insert of all medicines prescribed to you and be aware of all of the risks of treatment even beyond those discussed today.  All therapies have potential risk of harm or side effects or medication interactions.  It is your duty and for your safety to discuss with the pharmacist and our office with questions, and to notify us and stop treatment if problems arise, but know that our intention is that the benefits outweigh those potential  risks and we strive to make you healthier and to improve your quality of life.    Referrals, and Radiology Information:    If your insurance requires a referral to a specialist, please allow 5 business days to process your referral request.    If Pablito Knight MD orders a CT or MRI, it may take up to 10 business days to receive approval from your insurance company. Once our office has called informing you that the insurance company approved your testing, please call Central Scheduling at [phone]  Please allow our office 5 business days to contact you regarding any testing results.    Refill policies:   Allow 3 business days for refills; controlled substances may take longer and must be picked up from the office in person.  Narcotic medications can only be filled in 30 day increments and must be refilled at an office visit only.  If your prescription is due for a refill, you may be due for a follow-up appointment.  We cannot refill your maintenance medications at a preventative wellness visit.  To best provide you care, patients receiving maintenance medications need to be seen at least twice a year.

## 2024-03-28 ENCOUNTER — TELEPHONE (OUTPATIENT)
Dept: FAMILY MEDICINE CLINIC | Facility: CLINIC | Age: 49
End: 2024-03-28

## 2024-03-29 ENCOUNTER — OFFICE VISIT (OUTPATIENT)
Dept: PHYSICAL MEDICINE AND REHAB | Facility: CLINIC | Age: 49
End: 2024-03-29
Payer: COMMERCIAL

## 2024-03-29 VITALS — BODY MASS INDEX: 25.17 KG/M2 | WEIGHT: 194 LBS | HEART RATE: 93 BPM | HEIGHT: 73.5 IN | OXYGEN SATURATION: 95 %

## 2024-03-29 DIAGNOSIS — M79.18 MYOFASCIAL PAIN: ICD-10-CM

## 2024-03-29 DIAGNOSIS — M25.50 MULTIPLE JOINT PAIN: Primary | ICD-10-CM

## 2024-03-29 DIAGNOSIS — G62.9 NEUROPATHY: ICD-10-CM

## 2024-03-29 PROCEDURE — 99215 OFFICE O/P EST HI 40 MIN: CPT | Performed by: PHYSICAL MEDICINE & REHABILITATION

## 2024-03-29 RX ORDER — DULOXETIN HYDROCHLORIDE 60 MG/1
60 CAPSULE, DELAYED RELEASE ORAL DAILY
Qty: 30 CAPSULE | Refills: 0 | Status: SHIPPED | OUTPATIENT
Start: 2024-03-29

## 2024-03-29 NOTE — PROGRESS NOTES
Progress note    C/C:   Chief Complaint   Patient presents with    Follow - Up     LOV 1/3/24 pt is here with complaints of bilateral pubic bone pain. XR of shoulders was completed 2/2/24. Daily n/t starting at his shoulders all the way down to his feet. Takes muscle relaxer to ease pain. Pain 6/10      HPI: 48 year old male presents for follow up. About 1 month ago was sitting on a pew at [employer] when he began to experience acute left buttock pain, and he has been having lower back pain as well. Was seen by his PCP who ordered an x-ray of the lumbar spine that was done at an outside facility, he brings the results with him today.    He also continues to have bilateral shoulder pain that initially improved with physical therapy, but was quite significant yesterday.  He has not noticed any worsening of her range of motion.  Since the last time I saw him he was seen by Dr. Moody who recommended physical therapy.  He was also seen by Dr. Julita Cody Achilles tendinitis.  He also continues to see an endocrinologist and remains on Forteo for osteoporosis.  Continues duloxetine 40 mg daily for neuropathic symptoms in the legs, abdomen, chest. He continues to follow with neurology for thoracic myelomalacia, dilantin toxicity and seizure disorder.    Pertinent allergies:   Allergies   Allergen Reactions    Tegretol [Carbamazepine] OTHER (SEE COMMENTS)     Burn type inflammation on skin.         Physical exam:  Pulse 93   Ht 73.5\"   Wt 194 lb (88 kg)   SpO2 95%   BMI 25.25 kg/m²      Lumbar spine exam:    No skin rash lumbar/upper sacral region  No pain with lumbar flexion. + pain with lumbar extension.mildly limited ROM in both directions  No tenderness to palpation bilateral lumbar paraspinals. No ttp left PSIS.  5/5 LE strength b/l  SILT b/l LE  2/4 quad, gastrocs reflexes b/l  SLR negative left  JHONATAN test negative  Gaenslens' test negative    Imaging: XR lumbar spine dated 3/28/24 independently reviewed. No  fractures. Mild bilateral L5-S1 arthrosis.     Assessment and plan  Left buttock pain  Thoracic myelopathy  Multiple joint pain (shoulders, hips, knees)  Seizure disorder  Osteoporosis, on forteo    Possibly the left buttock pain is SIJ pain. From the visualized Xrays I see no evidence of sacral fractures. He may continue with celebrex, recommend increasing the duloxetine to 60mg for chronic musculoskeletal pain, neuropathic symptoms from thoracic myelopathy. I also recommend that he take glucosamine/chondroitin sulfate for joint health, and see rheumatology for evaluation.     Follow up in 3-4 months.     40 minutes spent precharting, conducting H&P, reviewing images, discussing treatment options, completing documentation.

## 2024-03-29 NOTE — PATIENT INSTRUCTIONS
Recommend you take Osteo Bi-Flex or similar (glucosamine/chondroitin sulfate) for at least the next 3 months and see if that helps with your multiple joint issues.    I sent a 1 month supply of 60mg capsules of duloxetine to your pharmacy. If that works better for you can make it a 90 day supply on next refill.

## 2024-03-31 ENCOUNTER — PATIENT MESSAGE (OUTPATIENT)
Dept: PHYSICAL MEDICINE AND REHAB | Facility: CLINIC | Age: 49
End: 2024-03-31

## 2024-04-01 NOTE — TELEPHONE ENCOUNTER
From: Arron Gutierrez  To: Wiley Escalante  Sent: 3/31/2024 7:44 AM CDT  Subject: Abdomen pain    I forgot to mention abdomen pain below ribs on right. CT scan showed organs were fine. Please take a look and let me know if you see anything. Pain used to be in evening. Now I noticed while using indoor bike and walking. Thanks

## 2024-04-03 RX ORDER — CELECOXIB 200 MG/1
200 CAPSULE ORAL 2 TIMES DAILY
Qty: 60 CAPSULE | Refills: 0 | Status: SHIPPED | OUTPATIENT
Start: 2024-04-03

## 2024-04-03 NOTE — TELEPHONE ENCOUNTER
Celecoxib    Last OV: 2/2/24  Last refill date: 3/4/24 #/refills: 60/0  Upcoming appt: none       2/21/24 11:19 AM    Glucose  60 - 99 mg/dL 88   Comment: Reference range for fasting plasma glucose only.   Sodium  135 - 145 mmol/L 139   Potassium  3.5 - 5.0 mmol/L 5.0   Chloride  98 - 108 mmol/L 100   Carbon Dioxide  23 - 30 mmol/L 26   Anion Gap  6 - 15 mmol/L 13   Blood Urea Nitrogen  7 - 20 mg/dL 24 High    Creatinine  0.50 - 1.40 mg/dL 0.85   eGFR Estimate, All  >=90 mL/min/1.73 sq. m 107

## 2024-04-04 ENCOUNTER — PATIENT MESSAGE (OUTPATIENT)
Dept: PHYSICAL MEDICINE AND REHAB | Facility: CLINIC | Age: 49
End: 2024-04-04

## 2024-04-04 ENCOUNTER — OFFICE VISIT (OUTPATIENT)
Dept: ORTHOPEDICS CLINIC | Facility: CLINIC | Age: 49
End: 2024-04-04
Payer: COMMERCIAL

## 2024-04-04 ENCOUNTER — HOSPITAL ENCOUNTER (OUTPATIENT)
Dept: GENERAL RADIOLOGY | Age: 49
Discharge: HOME | End: 2024-04-04
Attending: PODIATRIST
Payer: COMMERCIAL

## 2024-04-04 VITALS — HEIGHT: 73.5 IN | WEIGHT: 194 LBS | BODY MASS INDEX: 25.17 KG/M2

## 2024-04-04 DIAGNOSIS — S86.911A: ICD-10-CM

## 2024-04-04 DIAGNOSIS — M67.40 GANGLION CYST: ICD-10-CM

## 2024-04-04 DIAGNOSIS — M79.671 RIGHT FOOT PAIN: Primary | ICD-10-CM

## 2024-04-04 DIAGNOSIS — M79.671 RIGHT FOOT PAIN: ICD-10-CM

## 2024-04-04 PROCEDURE — 73630 X-RAY EXAM OF FOOT: CPT | Performed by: PODIATRIST

## 2024-04-04 PROCEDURE — 99214 OFFICE O/P EST MOD 30 MIN: CPT | Performed by: PODIATRIST

## 2024-04-04 NOTE — PROGRESS NOTES
[facility] Progress Note    Subjective:   Arron returns today but with a new different issue of his right foot.  Other issues remain the same.    Rubbed on laces?friction on top of foot  Red , swelling and pain top of right foot    In PT - they did some graston and modalities yesterday-he thinks it may have helped    He does not relate anything repetitive he does - using great toe ect      Objective:   Exam right foot  Dorsum midfoot overlying course of EHL - mild swelling, erythema, warmth with likely underlying ganglion cyst.  Movable semi fluid filled approximately 1/2 cm in diameter  Full strength EHL EHB, no tear, no fluctuance  No pain with full ROM first mpj  No proximal tendon or muscle pain  No deformity          Diagnosis  Current Medical Diagnoses   Diagnosis    Seizure disorder (formerly [facility])    Degeneration of lumbar or lumbosacral intervertebral disc    Vertigo, benign paroxysmal, unspecified laterality    Medication side effect    Chronic cerebral venous sinus thrombosis (formerly [facility])    Postoperative state    Right temporal lobe mass    Accidental phenytoin poisoning    Neuropathy    Sebaceous cyst of scrotum    Inguinal mass    Carla's deformity of both heels    Tendonitis    Foot arch pain    Myelomalacia (formerly [facility])    Osteopenia    Closed fracture of ramus of right pubis (formerly [facility])    Right hip pain    Bilateral hip joint arthritis    Other osteoporosis without current pathological fracture                   Axis III:   Past Medical History:   Diagnosis Date    Anemia 4/21    Back pain 12/21    Myelomalacia and myelopathy    Bleeding nose 1/2000    Side affect of meds, not currently    Bloating 6/21    Feel full    Blood in the stool 5/21    Bright red July 2022    Blurred vision 9/20    Cerebellar atrophy (HCC) 10/2022    Constipation 12/20    Not currently    COVID-19     Depression 3/23    Neuro paych eval at [facility]    Diarrhea, unspecified 9/22    Cant control    Dilantin toxicity     Dizziness 9/20    Still  whenever lean over    Dyssynergia     Fatigue 10/22    Sleep early    Headache disorder 10/22    Hemorrhoids 10/22    Irregular bowel habits 10/20    Constpated and switched to diarrhea    Myomalacia 10/2022    Night sweats 12/22    Use body pillow and wake up soaked    Pain in joints 12/21    Osteoarthritis in shoulder and bad knee pain    Pain with bowel movements 3/21    Rash 4/21    Seizure disorder (HCC) 1989    Sleep disturbance 1/22    Muscle spasm and jump up    Stool incontinence 5/21    Seem to be leaking after bowel movements    Uncomfortable fullness after meals 6/21    Stomach felels bloated a lot    Visual impairment     glasses    Wears glasses 9/20    Blood shot irritation, plug in tear duct    Weight loss 12/20      Imaging: 3 views xray right foot  Mild increased ST density dorsum midfoot        Assessment/Plan:     Diagnoses and all orders for this visit:    Right foot pain  -     XR FOOT, COMPLETE (MIN 3 VIEWS), RIGHT (CPT=73630); Future    Ganglion cyst    Lower extremity tendon strain, right, initial encounter        We looked at xrays together.    Discussed the tendon injury and ganglion cyst  Avoid rubbing and friction/skip lace pattern on shoe  Continue modalities, tendon work in PT - helpful  Icing, topical voltaren gel may help    Next step if continues to bother - we could aspirate and inject cyst  Consider soft tissue US  Excision of cyst  Follow up judit Thomson DPM  [facility]    ASSET4 speech recognition software was used to prepare this note. If a word or phrase is confusing, it is likely do to a failure of recognition. Please contact me with any questions or clarifications.

## 2024-04-05 NOTE — TELEPHONE ENCOUNTER
From: Arron Gutierrez  To: Wiley Escalante  Sent: 4/4/2024 6:43 PM CDT  Subject: Rheumatologist     I’ve been waiting for a call. Just looked at after visit summary. Staff didn’t give me a number to call.

## 2024-04-05 NOTE — TELEPHONE ENCOUNTER
MCM sent with url for [facility] website provider search for rheumatology

## 2024-04-07 ENCOUNTER — PATIENT MESSAGE (OUTPATIENT)
Dept: ORTHOPEDICS CLINIC | Facility: CLINIC | Age: 49
End: 2024-04-07

## 2024-04-08 ENCOUNTER — MED REC SCAN ONLY (OUTPATIENT)
Dept: ORTHOPEDICS CLINIC | Facility: CLINIC | Age: 49
End: 2024-04-08

## 2024-04-08 NOTE — TELEPHONE ENCOUNTER
From: Arron Gutierrez  To: Nohelia Moody  Sent: 4/7/2024 8:40 AM CDT  Subject: Pain    My entire body hurts. Both shoulders. Left shoulder making it difficult to sleep. I’m going to see a rheumatologist soon. Let me know if you want to move forward with MRI or wait. Thanks

## 2024-04-09 ENCOUNTER — TELEPHONE (OUTPATIENT)
Dept: ORTHOPEDICS CLINIC | Facility: CLINIC | Age: 49
End: 2024-04-09

## 2024-04-09 DIAGNOSIS — M25.511 RIGHT SHOULDER PAIN, UNSPECIFIED CHRONICITY: Primary | ICD-10-CM

## 2024-04-12 ENCOUNTER — OFFICE VISIT (OUTPATIENT)
Dept: ORTHOPEDICS CLINIC | Facility: CLINIC | Age: 49
End: 2024-04-12
Payer: COMMERCIAL

## 2024-04-12 ENCOUNTER — HOSPITAL ENCOUNTER (OUTPATIENT)
Dept: GENERAL RADIOLOGY | Age: 49
Discharge: HOME | End: 2024-04-12
Attending: ORTHOPAEDIC SURGERY
Payer: COMMERCIAL

## 2024-04-12 DIAGNOSIS — M25.511 RIGHT SHOULDER PAIN, UNSPECIFIED CHRONICITY: ICD-10-CM

## 2024-04-12 DIAGNOSIS — M25.512 LEFT SHOULDER PAIN, UNSPECIFIED CHRONICITY: Primary | ICD-10-CM

## 2024-04-12 PROCEDURE — 73030 X-RAY EXAM OF SHOULDER: CPT | Performed by: ORTHOPAEDIC SURGERY

## 2024-04-12 PROCEDURE — 99213 OFFICE O/P EST LOW 20 MIN: CPT | Performed by: ORTHOPAEDIC SURGERY

## 2024-04-12 NOTE — PROGRESS NOTES
Orthopaedic Surgery  [address] [place] [id number]  [phone]       Name: Arron Gutierrez   MRN: [id number]  Date: 4/12/2024     REASON FOR VISIT: Bilateral shoulder pain.     INTERVAL HISTORY:  Arron Gutierrez is a 48 year old right-hand dominant male who presents today for bilateral shoulder pain, left greater than right.     To summarize: patient was last seen in office by Dr. Moody on 2/2/24. He presented with left shoulder pain sustained early December of 2023. He experienced soreness and difficulty with range of motion. He has not yet tried conservative treatment for the shoulder.      Has been started on Teriparatide in the interval for bone density.     PE:   There were no vitals filed for this visit.  Estimated body mass index is 25.25 kg/m² as calculated from the following:    Height as of 4/4/24: 6' 1.5\" (1.867 m).    Weight as of 4/4/24: 194 lb.    Physical Exam  Constitutional:       Appearance: Normal appearance.   HENT:      Head: Normocephalic and atraumatic.   Eyes:      Extraocular Movements: Extraocular movements intact.   Neck:      Musculoskeletal: Normal range of motion and neck supple.   Cardiovascular:      Pulses: Normal pulses.   Pulmonary:      Effort: Pulmonary effort is normal. No respiratory distress.   Abdominal:      General: There is no distension.   Skin:     General: Skin is warm.      Capillary Refill: Capillary refill takes less than 2 seconds.      Findings: No bruising.   Neurological:      General: No focal deficit present.      Mental Status: She is alert.   Psychiatric:         Mood and Affect: Mood normal.     Examination of the right and left shoulder demonstrates:     Skin is intact, warm and dry.   Cervical:  Full ROM  Spurling's  Negative    Deformity:   none  Atrophy:   none    Scapular winging: Negative    Palpation:     AC Joint   Negative  Biceps Tendon  Negative  Greater Tuberosity Negative    ROM:   Forward Flexion:  full and  symmetric  Abduction:   full and symmetric  External Rotation:  full and symmetric  Internal Rotation:  full and symmetric    Rotator Cuff Strength:   Supraspinatus:   5/5  Subscapularis:   5/5  Infraspinatus/Teres: 5/5    Provocative Tests:   Coronel:   Negative  Speed's:   Negative  [place]on's:   Equivocal  Lift-off:    Negative  Apprehension:  Negative  Sulcus Sign:   Negative    Neurovascular Upper Extremity (Bilateral)  Motor:    5/5 EPL, Finger Abduction, , Pinch, Deltoid  Sensation:   intact to light touch median, ulnar, radial and axillary nerve  Circulation:   Normal, 2+ radial pulse    The contralateral upper extremity is without limitation in range of motion or strength, no positive provocative maneuvers.     Radiographic Examination/Diagnostics:    X-Ray personally viewed, independently interpreted and radiology report was reviewed.    Radiographs demonstrate no evidence of osteoarthritis with well-maintained joint space.  No fractures or dislocations.    IMPRESSION: Arron Gutierrez is a 48 year old with bilateral shoulder pain- findings consistent with right chronic AC joint injury bilateral rotator cuff tendinopathy.     Plan:   We recommend conservative treatment with physical therapy to return to baseline function. Continue follow up with Dr. Escalante in Physiatry, and Rheumatology.     Follow up as needed.           Robson Parker, [facility], PA-C Orthopedic Surgery / Sports Medicine Specialist  [facility] Orthopaedic Surgery  [address] [place] [address] [id number] [place] [address].[email or web address]  [email or web address]  t: [phone]  o: [phone]  f: [phone]    This note was dictated using Dragon software.  While it was briefly proofread prior to completion, some grammatical, spelling, and word choice errors due to dictation may still occur.

## 2024-04-13 ENCOUNTER — PATIENT MESSAGE (OUTPATIENT)
Dept: PHYSICAL MEDICINE AND REHAB | Facility: CLINIC | Age: 49
End: 2024-04-13

## 2024-04-16 NOTE — TELEPHONE ENCOUNTER
From: Arron Gutierrez  To: Wiley Escalante  Sent: 4/13/2024 2:29 PM CDT  Subject: Left hip labrum now    I have injured the left now. Started walking and felt it. Not as bad as last year on right. I’m guessing labrum because of same adductor pain. Biking okay like last year as well. Limping badly at times walking. Some yelling in pain when weight bearing. I’m letting you know because I don’t won’t it to go undiagnosed and be in pain like last summer.

## 2024-04-17 ENCOUNTER — OFFICE VISIT (OUTPATIENT)
Dept: ORTHOPEDICS CLINIC | Facility: CLINIC | Age: 49
End: 2024-04-17
Payer: COMMERCIAL

## 2024-04-17 DIAGNOSIS — M75.22 BICEPS TENDINITIS OF LEFT UPPER EXTREMITY: Primary | ICD-10-CM

## 2024-04-17 DIAGNOSIS — M25.512 LEFT SHOULDER PAIN, UNSPECIFIED CHRONICITY: ICD-10-CM

## 2024-04-17 PROCEDURE — 20610 DRAIN/INJ JOINT/BURSA W/O US: CPT | Performed by: ORTHOPAEDIC SURGERY

## 2024-04-17 PROCEDURE — 99213 OFFICE O/P EST LOW 20 MIN: CPT | Performed by: ORTHOPAEDIC SURGERY

## 2024-04-17 RX ORDER — TRIAMCINOLONE ACETONIDE 40 MG/ML
40 INJECTION, SUSPENSION INTRA-ARTICULAR; INTRAMUSCULAR ONCE
Status: COMPLETED | OUTPATIENT
Start: 2024-04-17 | End: 2024-04-17

## 2024-04-17 RX ORDER — KETOROLAC TROMETHAMINE 30 MG/ML
30 INJECTION, SOLUTION INTRAMUSCULAR; INTRAVENOUS ONCE
Status: COMPLETED | OUTPATIENT
Start: 2024-04-17 | End: 2024-04-17

## 2024-04-17 RX ADMIN — KETOROLAC TROMETHAMINE 30 MG: 30 INJECTION, SOLUTION INTRAMUSCULAR; INTRAVENOUS at 07:19:00

## 2024-04-17 RX ADMIN — TRIAMCINOLONE ACETONIDE 40 MG: 40 INJECTION, SUSPENSION INTRA-ARTICULAR; INTRAMUSCULAR at 07:19:00

## 2024-04-17 NOTE — PROCEDURES
Left Shoulder Glenohumeral Joint Injection    Name: Arron Gutierrez   MRN: [id number]  Date: 4/17/2024     Clinical Indications:   Persistent Shoulder pain refractory to conservative measures.     After informed consent, the injection site was marked, sterilized with topical chlorhexidine antiseptic, and locally anesthetized with skin refrigerant.    The patient was seated upright and the shoulder was exposed. Using sterile technique: 1 mL of 30mg/mL of Ketorolac, 2 mL of 0.5% Bupivicaine, 2 mL of 1% Lidocaine, and 1 mg of 40mg/mL of Triamcinolone (Kenalog) was injected with a Anterior approach utilizing a 22 gauge needle.  A band-aid was applied.  The patient tolerated the procedure well.        Nohelia Moody MD  Knee, Shoulder, & Elbow Surgery / Sports Medicine Specialist  Orthopaedic Surgery  [address] [place] [address] [id number] [place] [address].[email or web address]  [email or web address]  t: [phone]  o: [phone]  f: [phone]

## 2024-04-17 NOTE — PROGRESS NOTES
Orthopaedic Surgery  [address] [place] [address]  [phone]       Name: Arron Gutierrez   MRN: [id number]  Date: 4/17/2024     REASON FOR VISIT: Evaluation in the setting of bilateral shoulder pain,, most notable on the left    INTERVAL HISTORY:  Arron Gutierrez is a 48 year old right-hand dominant male who presents today for evaluation and discussion regarding bilateral shoulder pain.    To summarize: patient was initially evaluated for left shoulder pain onset in early December 2023. He experienced soreness and difficulty with range of motion. He began experiencing right shoulder pain recently since March 2024. The left side is more bothersome than the right.  He has been diligently working on physical therapy in the interval at [facility] and has had success in addressing musculoskeletal concerns.    Has been started on Teriparatide in the interval for bone density.  He has been evaluated at [facility] for rheumatology.    Today, pain is 7/10. He experiences numbness and tingling while sleeping. Reports pain in the AC joint.  Notably, he has a prior history of right-sided AC joint separation many years ago that has resolved without functional limitations.    He reports left-sided hip pain as well that is consistent with symptoms he experienced in the past with labral pathology.  We discussed continuing with rehabilitative efforts and monitoring.  He may be candidate for future injection consideration.    PE:   There were no vitals filed for this visit.  Estimated body mass index is 25.25 kg/m² as calculated from the following:    Height as of 4/4/24: 6' 1.5\" (1.867 m).    Weight as of 4/4/24: 194 lb.    Physical Exam  Constitutional:       Appearance: Normal appearance.   HENT:      Head: Normocephalic and atraumatic.   Eyes:      Extraocular Movements: Extraocular movements intact.   Neck:      Musculoskeletal: Normal range of motion and neck supple.   Cardiovascular:      Pulses:  Normal pulses.   Pulmonary:      Effort: Pulmonary effort is normal. No respiratory distress.   Abdominal:      General: There is no distension.   Skin:     General: Skin is warm.      Capillary Refill: Capillary refill takes less than 2 seconds.      Findings: No bruising.   Neurological:      General: No focal deficit present.      Mental Status: She is alert.   Psychiatric:         Mood and Affect: Mood normal.     Examination of the left shoulder demonstrates:     Skin is intact, warm and dry.   Cervical:  Full ROM  Spurling's                           Negative     Deformity:                         none  Atrophy:                             none    Scapular winging: Negative     Palpation:                            AC Joint                              equivocal  Biceps Tendon                  positive  Greater Tuberosity          Negative     ROM:   Forward Flexion:              full and symmetric  Abduction:                         full and symmetric  External Rotation:           full and symmetric  Internal Rotation:            full and symmetric     Rotator Cuff Strength:   Supraspinatus:                  5/5  Subscapularis:                   5/5  Infraspinatus/Teres:        5/5     Provocative Tests:   Coronel:                            Negative  Speed's:                             Negative  [place]'s:                           Negative  Lift-off:                                Negative  Apprehension:                  Negative  Sulcus Sign:                        Negative     Neurovascular Upper Extremity (Bilateral)  Motor:                                5/5 EPL, Finger Abduction, , Pinch, Deltoid  Sensation:                          intact to light touch median, ulnar, radial and axillary nerve  Circulation:                        Normal, 2+ radial pulse     The contralateral upper extremity is without limitation in range of motion or strength, no positive provocative maneuvers.  There is subtle  rotator cuff based discomfort with strength testing but strength is overall maintained.  Additional subtle discomfort with Coronel impingement testing.    Radiographic Examination/Diagnostics:    Shoulder XR personally viewed, independently interpreted and radiology report was reviewed.    XR SHOULDER, COMPLETE (MIN 2 VIEWS), RIGHT (CPT=73030)    Result Date: 4/12/2024  PROCEDURE:  XR SHOULDER, COMPLETE (MIN 2 VIEWS), RIGHT (CPT=73030)  TECHNIQUE:  Multiple views were obtained.  COMPARISON:  None.  INDICATIONS:  M25.511 Right shoulder pain, unspecified chronicity  PATIENT STATED HISTORY: (As transcribed by Technologist)  Ortho consult. Patient states chronic pain to right shoulder, history of skiing accident 30 years ago.    FINDINGS:  There is no acute fracture.  There is superior subluxation of the distal clavicle relative to the acromion with bony ankylosis across the articulation.  There is also heterotopic ossification which extends along the expected course of the cortical clavicular ligaments.  Findings are most likely related to prior AC joint separation and injury to cortical clavicular ligaments.  There is mild joint space narrowing of the glenohumeral joint with mild marginal osteophyte formation.            CONCLUSION:    1. Deformity distal clavicle and acromioclavicular joint with heterotopic ossification along cortical clavicular ligaments is consistent with prior AC joint separation/fracture.   2. There is no acute fracture.   3. There is mild joint space narrowing and marginal osteophyte formation of the glenohumeral joint.     LOCATION:  [facility]   Dictated by (CST): Scott Slade MD on 4/12/2024 at 11:09 AM     Finalized by (CST): Scott Slade MD on 4/12/2024 at 11:10 AM       XR SHOULDER, COMPLETE (MIN 2 VIEWS), LEFT (CPT=73030)     TECHNIQUE:  Multiple views were obtained. COMPARISON:  None. INDICATIONS:  M25.512 Left shoulder pain, unspecified chronicity PATIENT STATED HISTORY: (As transcribed  by Technologist)  Patient is here for ortho evaluation. Patient has left anterior and upper posterior-lateral shoulder pain for 1 month. No injury. History of osteopenia. FINDINGS:  Negative for fracture, dislocation, deformity or other acute bony abnormalities.  No soft tissue abnormalities.      CONCLUSION:  No acute fracture or other acute bony process.     LOCATION:  [facility] Dictated by (CST): Farooq Pimentel MD on 2/02/2024 at 10:37 AM  Finalized by (CST): Farooq Pimentel MD on 2/02/2024 at 10:38 AM        IMPRESSION: Arron Gutierrez is a 48 year old with left biceps tendinitis and posterior based shoulder pain ongoing since December 2023. Physical therapy has provided mild relief.     We elected to maximize conservative management with intra-articular corticosteroid and ketorolac injection coupled with physical therapy.     PLAN:   We reviewed the treatment of this disease condition.  Fortunately, treatment is amenable to conservative treatment which we chose to optimize at today's visit.  After a discussion of a variety of conservative treatment options, I recommended intra-articular injection with corticosteroid and ketorolac coupled with physical therapy to aid in strengthening, range of motion, functional improvement, and return to baseline activity.       We elected to proceed with the injection procedure at today's visit. We discussed the risk and benefits of the procedure; including, but not limited to: infection, injury to blood vessels, nerve injury, prolonged pain, swelling, site soreness, failure to progress, and need for advanced treatments.  The patient voiced understanding and agreed to proceed with the treatment plan.      FOLLOW-UP:   Return to clinic on an as needed basis.       Nohelia Moody MD  Knee, Shoulder, & Elbow Surgery / Sports Medicine Specialist  Orthopaedic Surgery  [address] [place] [id number]   [email or web address]  [email or web address]  t: [phone]  o:  [phone]  f: [phone]    This note was dictated using Dragon software.  While it was briefly proofread prior to completion, some grammatical, spelling, and word choice errors due to dictation may still occur.

## 2024-04-19 ENCOUNTER — OFFICE VISIT (OUTPATIENT)
Dept: PHYSICAL MEDICINE AND REHAB | Facility: CLINIC | Age: 49
End: 2024-04-19
Payer: COMMERCIAL

## 2024-04-19 VITALS — HEIGHT: 73.5 IN | WEIGHT: 194 LBS | RESPIRATION RATE: 18 BRPM | BODY MASS INDEX: 25.17 KG/M2

## 2024-04-19 DIAGNOSIS — S73.192D TEAR OF LEFT ACETABULAR LABRUM, SUBSEQUENT ENCOUNTER: Primary | ICD-10-CM

## 2024-04-19 PROCEDURE — 20611 DRAIN/INJ JOINT/BURSA W/US: CPT | Performed by: PHYSICAL MEDICINE & REHABILITATION

## 2024-04-19 PROCEDURE — 99214 OFFICE O/P EST MOD 30 MIN: CPT | Performed by: PHYSICAL MEDICINE & REHABILITATION

## 2024-04-19 RX ORDER — TRIAMCINOLONE ACETONIDE 40 MG/ML
40 INJECTION, SUSPENSION INTRA-ARTICULAR; INTRAMUSCULAR ONCE
Status: COMPLETED | OUTPATIENT
Start: 2024-04-19 | End: 2024-04-19

## 2024-04-19 RX ORDER — LIDOCAINE HYDROCHLORIDE 10 MG/ML
6 INJECTION, SOLUTION INFILTRATION; PERINEURAL ONCE
Status: COMPLETED | OUTPATIENT
Start: 2024-04-19 | End: 2024-04-19

## 2024-04-19 NOTE — PROCEDURES
Dx: left hip degenerative labral tear, left groin pain  Procedure: left hip joint steroid injection under US guidance    The patient is here for a left hip injection done under ultrasound guidance.  Under ultrasound guidance the left femoral-acetabular joint was identified and a jessica was placed on the patient's skin.The skin was cleaned with betadyne swabs x 3 and anesthetized with 1% lidocaine without epinephrine.  Then a 22 gauge needle was inserted under ultrasound guidance into the left femoral-acetabular joint.  Aspiration was performed and no blood, fluid, or air was aspirated.  The patient was then injected with 5 ml of 1 ml of 40 mg of Kenalog/ml and 4 ml of 1% lidocaine without epinephrine.  The needle was removed and a band aid was applied along with triple antibiotic ointment.  The patient will follow up in 2 weeks with a phone call.    These images are permanently stored in the ultrasound unit or PACS system.

## 2024-04-19 NOTE — PROGRESS NOTES
Progress note    C/C:   Chief Complaint   Patient presents with    Follow - Up     Returning patient here for follow up. LOV 3/29/24. Today, here with pain to L low back. +radiation down the buttock towards the groin. Reports left sided hip pain especially when walking.  Reports last week while walking he felt a \"strain\" to the left groin. Since then has had pain to the area. Denies N/T to the area.  CPL 6/10, at worse 9/10 and at best 3/10.       HPI: 48-year-old male presents for follow-up.  Since last time I saw him he has been having significant left groin pain that has been limiting his ability to tolerate standing and walking for about the past 1-1/2 weeks.  Not much pain at rest.      Pertinent allergies:   Allergies   Allergen Reactions    Tegretol [Carbamazepine] OTHER (SEE COMMENTS)     Burn type inflammation on skin.         Physical exam:  Resp 18   Ht 73.5\"   Wt 194 lb (88 kg)   BMI 25.25 kg/m²      5/5 bilateral lower extremity strength  1/4 bilateral lower extremity reflexes  Left hip flexion 120 degrees with left groin pain.  Provoked pain with internal rotation of the left hip  Straight leg raise negative    Imaging: No new imaging to review    Assessment and plan  Left hip pain    Discussed treatment of symptomatic left hip pain presumed to be of labral origin, which include physical therapy, NSAIDs, left hip joint steroid injection under ultrasound guidance.  He will proceed with physical therapy for left hip girdle strengthening and hip joint stabilization, order given, and left hip joint steroid injection under ultrasound guidance was done today.  See separate note for details.    Follow-up after physical therapy as needed.    Wiley Escalante DO  Physical Medicine and Rehabilitation  [facility]

## 2024-04-19 NOTE — PATIENT INSTRUCTIONS
Steroid Injection Information  What to expect: The injection contains Lidocaine (which numbs the area) and Kenalog (a steroid which decreases inflammation).  You may have pain relief within hours of the injection due to the Lidocaine.  The Kenalog can take a couple days, up to a couple weeks, to reach the full effect.  It is also possible to have a slight increase in symptoms over the first few days, but that should resolve fairly quickly.    How long will the injection last?: The length of response to an injection is variable.  Literally a couple weeks to a couple years.  The injection will decrease the inflammation and the pain will return if/when the inflammation returns.    Activity Recommendations: For the first 24 hours after injection, keep the area clean and dry.  It is ok to shower but don't soak in a tub during that time.  No vigorous activity such as running or heavy lifting for the first week but other than that you can gradually resume your normal activities immediately.  If you have a significant decrease in pain, be careful not to do too much too soon.  Again, the key is GRADUAL resumption of activites.    Things to look out for: Common injection side effects include soreness at the injection site, bruising, flushing of the face or skin, and a temporary increase in your blood sugars and/or blood pressure.  Infection is very rare but please notify my office ([facility] [phone]) if you develop any fevers, drainage from the injection site, or severe increase in pain.  If it is the weekend, go to an Emergency Room.       Physiatry   [phone]

## 2024-04-22 ENCOUNTER — TELEPHONE (OUTPATIENT)
Dept: PHYSICAL MEDICINE AND REHAB | Facility: CLINIC | Age: 49
End: 2024-04-22

## 2024-04-22 NOTE — TELEPHONE ENCOUNTER
Initiated authorization for Left hip joint steroid injection under [place] guidance CPT/HCPCS 09780,  with [employer]  Case #[id number] and [id number].  Status: Approved-authorization is not required per health plan however is not a guarantee of payment and may be subject to review once claim is submitted    Inj done in office

## 2024-04-23 ENCOUNTER — TELEPHONE (OUTPATIENT)
Dept: ORTHOPEDICS CLINIC | Facility: CLINIC | Age: 49
End: 2024-04-23

## 2024-04-23 DIAGNOSIS — M25.551 BILATERAL HIP PAIN: Primary | ICD-10-CM

## 2024-04-23 DIAGNOSIS — M25.552 BILATERAL HIP PAIN: Primary | ICD-10-CM

## 2024-04-23 NOTE — TELEPHONE ENCOUNTER
Patient is coming in for bilat hip pain.    No imaging or XRAY's in EPIC  Please advise if needed    Future Appointments   Date Time Provider Department Center   4/30/2024 11:45 AM Carlos Toledo MD [id number] Nap 4   5/1/2024 10:15 AM Marvin Catalan MD G&B DERM ECC [facility]   5/2/2024  9:45 AM Claude Casey DPM [id number]   5/6/2024 11:30 AM Jorge Toussaint, DO EMG ORTHO Wo [id number]   5/6/2024  2:30 PM Erik, GIRISH Soriano SGINP ECC SUB GI   7/30/2024 10:00 AM Wiley Escalante, DO PM&R ELM [facility]   9/30/2024 10:00 AM Pablito Knight MD EMG [address] [facility] [address]

## 2024-04-25 ENCOUNTER — TELEPHONE (OUTPATIENT)
Dept: PHYSICAL MEDICINE AND REHAB | Facility: CLINIC | Age: 49
End: 2024-04-25

## 2024-04-25 ENCOUNTER — MED REC SCAN ONLY (OUTPATIENT)
Dept: ORTHOPEDICS CLINIC | Facility: CLINIC | Age: 49
End: 2024-04-25

## 2024-04-25 NOTE — TELEPHONE ENCOUNTER
Received 90 day refill request for duloxetine hcl dr 60 mg cap via fax. Placed in RN folder.    [Initial Consultation] : an initial consultation for [Hay Fever] : hay fever [Hives] : hives [FreeTextEntry2] : recurrent oral thrus, chronic sinusitis

## 2024-04-28 DIAGNOSIS — M79.18 MYOFASCIAL PAIN: ICD-10-CM

## 2024-04-28 DIAGNOSIS — M25.50 MULTIPLE JOINT PAIN: ICD-10-CM

## 2024-04-28 DIAGNOSIS — G62.9 NEUROPATHY: ICD-10-CM

## 2024-04-28 RX ORDER — ALUMINUM CHLORIDE 20 %
1 SOLUTION, NON-ORAL TOPICAL NIGHTLY
Qty: 75 ML | Refills: 0 | Status: SHIPPED | OUTPATIENT
Start: 2024-04-28

## 2024-04-29 RX ORDER — DULOXETIN HYDROCHLORIDE 60 MG/1
60 CAPSULE, DELAYED RELEASE ORAL DAILY
Qty: 30 CAPSULE | Refills: 0 | Status: SHIPPED | OUTPATIENT
Start: 2024-04-29

## 2024-04-29 NOTE — TELEPHONE ENCOUNTER
Refill Request    Medication request: DULoxetine 60 MG Oral Cap DR Particles. Take 1 capsule (60 mg total) by mouth daily.      LOV:4/19/2024 Wiley Escalante DO   Due back to clinic per last office note:  The patient will follow up in 2 weeks with a phone call.     NOV: 7/30/2024 Wiley Escalante DO      [place]/Last refill: 3/29/2024 #30    Urine drug screen (if applicable): N/A  Pain contract: N/A    LOV plan (if weaning or changing medications): no changes mentioned

## 2024-04-30 ENCOUNTER — TELEPHONE (OUTPATIENT)
Dept: PHYSICAL MEDICINE AND REHAB | Facility: CLINIC | Age: 49
End: 2024-04-30

## 2024-04-30 ENCOUNTER — OFFICE VISIT (OUTPATIENT)
Dept: SURGERY | Facility: CLINIC | Age: 49
End: 2024-04-30
Payer: COMMERCIAL

## 2024-04-30 DIAGNOSIS — N13.8 BPH WITH OBSTRUCTION/LOWER URINARY TRACT SYMPTOMS: Primary | ICD-10-CM

## 2024-04-30 DIAGNOSIS — R82.90 URINE FINDING: ICD-10-CM

## 2024-04-30 DIAGNOSIS — N40.1 BPH WITH OBSTRUCTION/LOWER URINARY TRACT SYMPTOMS: Primary | ICD-10-CM

## 2024-04-30 PROCEDURE — 99214 OFFICE O/P EST MOD 30 MIN: CPT | Performed by: SURGERY

## 2024-04-30 PROCEDURE — 81003 URINALYSIS AUTO W/O SCOPE: CPT | Performed by: SURGERY

## 2024-04-30 RX ORDER — TAMSULOSIN HYDROCHLORIDE 0.4 MG/1
0.4 CAPSULE ORAL EVERY EVENING
Qty: 90 CAPSULE | Refills: 6 | Status: SHIPPED | OUTPATIENT
Start: 2024-04-30

## 2024-04-30 RX ORDER — CELECOXIB 200 MG/1
200 CAPSULE ORAL 2 TIMES DAILY
Qty: 60 CAPSULE | Refills: 0 | Status: SHIPPED | OUTPATIENT
Start: 2024-04-30
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
## 2024-04-30 NOTE — TELEPHONE ENCOUNTER
Celecoxib    DOS: n/a  Last OV: 4/17/24  Last refill date: 4/3/24 #/refills: 60/0  Upcoming appt:   Future Appointments   Date Time Provider Department Center   5/6/2024 11:30 AM Jorge Toussaint DO [id number]       2/21/24 11:19 AM    Blood Urea Nitrogen  7 - 20 mg/dL 24 High    Creatinine  0.50 - 1.40 mg/dL 0.85

## 2024-05-02 ENCOUNTER — OFFICE VISIT (OUTPATIENT)
Dept: PODIATRY CLINIC | Facility: CLINIC | Age: 49
End: 2024-05-02
Payer: COMMERCIAL

## 2024-05-02 DIAGNOSIS — B35.1 ONYCHOMYCOSIS: ICD-10-CM

## 2024-05-02 DIAGNOSIS — G62.9 NEUROPATHY: Primary | ICD-10-CM

## 2024-05-02 DIAGNOSIS — M79.675 PAIN IN TOES OF BOTH FEET: ICD-10-CM

## 2024-05-02 DIAGNOSIS — M79.674 PAIN IN TOES OF BOTH FEET: ICD-10-CM

## 2024-05-02 PROCEDURE — 99203 OFFICE O/P NEW LOW 30 MIN: CPT | Performed by: PODIATRIST
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
## 2024-05-03 ENCOUNTER — OFFICE VISIT (OUTPATIENT)
Dept: NEUROLOGY | Facility: CLINIC | Age: 49
End: 2024-05-03
Payer: COMMERCIAL

## 2024-05-03 VITALS
OXYGEN SATURATION: 96 % | DIASTOLIC BLOOD PRESSURE: 72 MMHG | SYSTOLIC BLOOD PRESSURE: 110 MMHG | BODY MASS INDEX: 25 KG/M2 | WEIGHT: 192 LBS | HEART RATE: 99 BPM

## 2024-05-03 DIAGNOSIS — G95.89 MYELOMALACIA (HCC): ICD-10-CM

## 2024-05-03 DIAGNOSIS — G40.909 SEIZURE DISORDER (HCC): Primary | ICD-10-CM

## 2024-05-03 DIAGNOSIS — R51.9 FACIAL PAIN: ICD-10-CM

## 2024-05-03 PROCEDURE — 99213 OFFICE O/P EST LOW 20 MIN: CPT | Performed by: OTHER

## 2024-05-03 RX ORDER — GABAPENTIN 400 MG/1
400 CAPSULE ORAL 2 TIMES DAILY
Qty: 60 CAPSULE | Refills: 5 | Status: SHIPPED | OUTPATIENT
Start: 2024-05-03

## 2024-05-03 NOTE — PATIENT INSTRUCTIONS
Refill policies:    Allow 2-3 business days for refills; controlled substances may take longer.  Contact your pharmacy at least 5 days prior to running out of medication and have them send an electronic request or submit request through the “request refill” option in your [employer] account.  Refills are not addressed on weekends; covering physicians do not authorize routine medications on weekends.  No narcotics or controlled substances are refilled after noon on Fridays or by on call physicians.  By law, narcotics must be electronically prescribed.  A 30 day supply with no refills is the maximum allowed.  If your prescription is due for a refill, you may be due for a follow up appointment.  To best provide you care, patients receiving routine medications need to be seen at least once a year.  Patients receiving narcotic/controlled substance medications need to be seen at least once every 3 months.  In the event that your preferred pharmacy does not have the requested medication in stock (e.g. Backordered), it is your responsibility to find another pharmacy that has the requested medication available.  We will gladly send a new prescription to that pharmacy at your request.    Scheduling Tests:    If your physician has ordered radiology tests such as MRI or CT scans, please contact Central Scheduling at [phone] right away to schedule the test.  Once scheduled, the [facility] Centralized Referral Team will work with your insurance carrier to obtain pre-certification or prior authorization.  Depending on your insurance carrier, approval may take 3-10 days.  It is highly recommended patients assure they have received an authorization before having a test performed.  If test is done without insurance authorization, patient may be responsible for the entire amount billed.      Precertification and Prior Authorizations:  If your physician has recommended that you have a procedure or additional testing performed the [facility]  Centralized Referral Team will contact your insurance carrier to obtain pre-certification or prior authorization.    You are strongly encouraged to contact your insurance carrier to verify that your procedure/test has been approved and is a COVERED benefit.  Although the [facility] Centralized Referral Team does its due diligence, the insurance carrier gives the disclaimer that \"Although the procedure is authorized, this does not guarantee payment.\"    Ultimately the patient is responsible for payment.   Thank you for your understanding in this matter.  Paperwork Completion:  If you require FMLA or disability paperwork for your recovery, please make sure to either drop it off or have it faxed to our office at [phone]. Be sure the form has your name and date of birth on it.  The form will be faxed to our Forms Department and they will complete it for you.  There is a 25$ fee for all forms that need to be filled out.  Please be aware there is a 10-14 day turnaround time.  You will need to sign a release of information (JENNIFER) form if your paperwork does not come with one.  You may call the Forms Department with any questions at [phone].  Their fax number is [phone].
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
## 2024-05-03 NOTE — PROGRESS NOTES
Pt- here on vagus nerve, symptoms loss of sensation on both arms and legs started on 2016.  Medications gabapentin and Cymbalta.

## 2024-05-06 ENCOUNTER — HOSPITAL ENCOUNTER (OUTPATIENT)
Dept: GENERAL RADIOLOGY | Age: 49
Discharge: HOME | End: 2024-05-06
Attending: FAMILY MEDICINE
Payer: COMMERCIAL

## 2024-05-06 ENCOUNTER — OFFICE VISIT (OUTPATIENT)
Dept: ORTHOPEDICS CLINIC | Facility: CLINIC | Age: 49
End: 2024-05-06
Payer: COMMERCIAL

## 2024-05-06 VITALS — WEIGHT: 192 LBS | HEIGHT: 73 IN | BODY MASS INDEX: 25.45 KG/M2

## 2024-05-06 DIAGNOSIS — M24.852 OTH SPECIFIC JOINT DERANGEMENTS OF LEFT HIP, NEC: Primary | ICD-10-CM

## 2024-05-06 DIAGNOSIS — M25.551 BILATERAL HIP PAIN: ICD-10-CM

## 2024-05-06 DIAGNOSIS — Z87.312 HISTORY OF STRESS FRACTURE: ICD-10-CM

## 2024-05-06 DIAGNOSIS — M25.552 BILATERAL HIP PAIN: ICD-10-CM

## 2024-05-06 DIAGNOSIS — M85.89 OSTEOPENIA OF MULTIPLE SITES: ICD-10-CM

## 2024-05-06 PROCEDURE — 73523 X-RAY EXAM HIPS BI 5/> VIEWS: CPT | Performed by: FAMILY MEDICINE

## 2024-05-06 PROCEDURE — 99214 OFFICE O/P EST MOD 30 MIN: CPT | Performed by: FAMILY MEDICINE

## 2024-05-08 NOTE — H&P
Sports Medicine Clinic Note     Subjective:    Chief Complaint: Primarily left-sided groin/hip pain.    History of Present Illness: This is a pleasant 48-year-old male, presents for consultation regarding primarily left sided hip pain. Has previously seen PM&R for this issue and underwent articular left hip injection 4/19/24 with Dr. Escalante. Since the injection he reports some improvement in his left groin pain following the hip joint steroid injection and commencement of physical therapy. Despite these interventions, he continues to experience pain, particularly during prolonged walking/standing and certain movements like deep flexion. Pain severity ranges, can be severe on bad days, with occasional sharp pain in the left groin area, particularly during internal and external rotation of the hip. The patient denies any numbness or tingling in the area. He also reports generalized fatigue and has been managing multiple chronic conditions, including issues related to previous phenytoin toxicity and osteopenia which he states was partially provoked by this toxicity. Has been started on Teriparatide in the interval for bone density. Has history of stress fractures in the LE including the right pubic ramus - now healed with conservative management. He has been evaluated at [facility] for rheumatology.    Objective:    Bilateral Hips Examination:    Inspection:  No visible swelling or deformities.  No skin changes or rashes.    Palpation:  Tenderness localized to the left anterior hip and groin.  No palpable masses or significant warmth.    Range of Motion:  Flexion of the left hip limited by pain beyond 100 degrees.  Internal rotation of the left hip provokes sharp pain.  Right hip demonstrates full range with mild discomfort.    Neurovascular:  Sensation intact bilaterally in the lower limbs.  Pulses palpable, capillary refill time normal.    Special Tests:  JHONATAN test, FADIR, Jonny, Sarah's reproduces  left groin pain. Not significantly painful with right sided testing.  No signs of instability or significant laxity in either hip.    Imaging Review:    Bilateral hip x-rays show no acute fracture or dislocation.  Findings suggestive of subchondral sclerosis at both hip joint, may be sequela of DJD. Evidence of healed right pubic ramus fracture.    Assessment:    Chronic Bilateral Hip Pain - likely multifactorial involving degenerative changes suggested by subchondral sclerosis and previous clinically suspected labral pathology in the left hip.  Generalized Musculoskeletal Pain - potentially exacerbated by chronic conditions including myelomalacia, myelopathy, and sequelae of phenytoin toxicity.    Plan:    Additional imaging/workup:  MRI of the left hip - Ordered to further evaluate for labral tears, articular cartilage condition, and any subtle bony changes not evident on x-rays.    Therapy:  Continue with physical therapy focusing on hip stabilization, core strengthening, and pain management strategies.    Medications:  Tylenol as needed for pain.  Continue Teriparatide as previously prescribed for bone health.    Bracing/Casting:  Consider using a supportive hip brace to help stabilize during activities and potentially reduce pain.    Procedures:  Depending on MRI findings, further interventions such as a repeat steroid injection or consultation for possible surgical evaluation may be considered.    Activity Recommendations:  Limit prolonged weight bearing and high impact activities.  Continue with modified exercises as tolerated, avoiding activities that provoke severe pain.    Follow-Up:  Tentatively scheduled to review MRI results and adjust treatment plan accordingly.  Patient is advised to return sooner if there is an increase in pain, new symptoms, or concerns.        Jorge Toussaint DO, LEEM   Primary Care Sports Medicine    Department of Orth[facility]  [facility]    [id number]  [place] [address]   [address] [place] [address]    t: [phone]  f: [phone]      [facility]

## 2024-05-12 ENCOUNTER — PATIENT MESSAGE (OUTPATIENT)
Dept: ORTHOPEDICS CLINIC | Facility: CLINIC | Age: 49
End: 2024-05-12

## 2024-05-13 ENCOUNTER — MED REC SCAN ONLY (OUTPATIENT)
Dept: PHYSICAL MEDICINE AND REHAB | Facility: CLINIC | Age: 49
End: 2024-05-13

## 2024-05-13 NOTE — TELEPHONE ENCOUNTER
From: Arron Gutierrez  To: Jorge BIGGS  Sent: 5/12/2024 5:09 AM CDT  Subject: Hip    I’m attaching MRI results. I have an appointment scheduled to see you. I have CD.

## 2024-05-20 ENCOUNTER — OFFICE VISIT (OUTPATIENT)
Facility: CLINIC | Age: 49
End: 2024-05-20

## 2024-05-20 VITALS — WEIGHT: 191 LBS | BODY MASS INDEX: 24.51 KG/M2 | HEIGHT: 74 IN

## 2024-05-20 DIAGNOSIS — R93.7 BONE MARROW EDEMA: ICD-10-CM

## 2024-05-20 DIAGNOSIS — S76.012D STRAIN OF LEFT HIP, SUBSEQUENT ENCOUNTER: ICD-10-CM

## 2024-05-20 DIAGNOSIS — S32.592A INFERIOR PUBIC RAMUS FRACTURE, LEFT, CLOSED, INITIAL ENCOUNTER (HCC): Primary | ICD-10-CM

## 2024-05-20 DIAGNOSIS — M70.62 TROCHANTERIC BURSITIS OF LEFT HIP: ICD-10-CM

## 2024-05-20 DIAGNOSIS — S76.012D MUSCLE STRAIN OF LEFT HIP, SUBSEQUENT ENCOUNTER: ICD-10-CM

## 2024-05-20 PROCEDURE — 99214 OFFICE O/P EST MOD 30 MIN: CPT | Performed by: FAMILY MEDICINE

## 2024-05-20 NOTE — PROGRESS NOTES
Sports Medicine Clinic Note     Subjective:    Chief Complaint: Persistent left-sided groin/hip pain. MRI follow up.    Interval History: The patient is a 48-year-old male who returns for follow-up regarding ongoing left-sided hip pain. Since the last visit, he has undergone an MRI as ordered, which has revealed significant findings he would like to review. The patient continues to report pain in the left groin area, particularly with prolonged walking, standing, and movements involving deep flexion. He notes some days are worse than others with occasional sharp pain during internal and external rotation of the hip. No new symptoms of numbness or tingling. He continues to experience generalized fatigue and is managing multiple chronic conditions, including osteopenia and other sequela of previous phenytoin toxicity. He has no new injuries or additional medical consultations since the last visit.    Objective:    Left Hip Examination:    Inspection:  No visible swelling or deformities.  No skin changes or rashes.    Palpation:  Tenderness localized to the left anterior hip and groin.  No palpable masses or significant warmth.    Range of Motion:  Deferred due to nature of injury.    Neurovascular:  Sensation intact bilaterally in the lower limbs.  Pulses palpable, capillary refill time normal.    Imaging:    MRI findings of the left pelvis and hips:  - Nondisplaced fracture of the left inferior pubic ramus with surrounding bone marrow edema.  - Bone marrow edema at the junction of the left superior pubic ramus and anterior left acetabulum, compatible with stress reaction or early developing stress fracture.  - Chronic healed nondisplaced fracture of the right inferior pubic ramus.  - Strain of the left obturator externus muscle with a probable small intramuscular hematoma.  - Mild left gluteus medius insertional tendinosis and trochanteric bursitis.    Assessment:    1. Nondisplaced fracture of the left inferior  pubic ramus with associated bone marrow edema.  2. Early developing stress fracture at the junction of the left superior pubic ramus and anterior left acetabulum.  3. Chronic healed nondisplaced fracture of the right inferior pubic ramus.  4. Strain of the left obturator externus muscle with probable small intramuscular hematoma.  5. Mild left gluteus medius insertional tendinosis and trochanteric bursitis.    Plan:    Bracing/Casting:  - Will offload the LLE with crutches for time being, tentatively 2-4 weeks of NWB/TTWB  - Consider using a supportive hip brace to stabilize during activities and potentially reduce pain.    Therapy:  - Prescribe physical therapy focusing on hip stabilization, core strengthening, and pain management strategies with WB precautions.  - Add modalities such as ultrasound or electrical stimulation to help reduce pain and inflammation.    Medications:  - Continue Tylenol as needed for pain.  - Continue Teriparatide as previously prescribed for bone health.    Procedures:  - If pain persists or worsens, consider a trochanteric steroid injection to augment. Deferred for now.    Activity Recommendations:  - Strictly limit weight-bearing activities and high-impact exercises.  - Encourage non-weight bearing activities such as swimming or cycling to maintain cardiovascular fitness without stressing the hip.  - Discussed prevention strategies to avoid this from recurring down the line given previous issues with this in his right hip.    Follow-Up:  - Tentatively scheduled to review progress in 6 weeks with repeat hip radiographs.  - Will have endocrinology follow up in interim to discuss bone health.  - Advise the patient to return earlier if symptoms worsen, new symptoms develop, or there are any concerns.        Jorge Toussaint DO, CAQSM   Primary Care Sports Medicine    Department of Orthopaedic Surgery  [facility]    [address] [place] [address]   [address] [place] [address],  [place] [address]    t: [phone]  f: [phone]      [email or web address]

## 2024-05-26 DIAGNOSIS — G62.9 NEUROPATHY: ICD-10-CM

## 2024-05-26 DIAGNOSIS — M25.50 MULTIPLE JOINT PAIN: ICD-10-CM

## 2024-05-26 DIAGNOSIS — M79.18 MYOFASCIAL PAIN: ICD-10-CM

## 2024-05-28 DIAGNOSIS — M79.18 MYOFASCIAL PAIN: ICD-10-CM

## 2024-05-28 DIAGNOSIS — M25.50 MULTIPLE JOINT PAIN: ICD-10-CM

## 2024-05-28 DIAGNOSIS — G62.9 NEUROPATHY: ICD-10-CM

## 2024-05-28 RX ORDER — CELECOXIB 200 MG/1
200 CAPSULE ORAL 2 TIMES DAILY
Qty: 60 CAPSULE | Refills: 0 | Status: SHIPPED | OUTPATIENT
Start: 2024-05-28

## 2024-05-28 RX ORDER — DULOXETIN HYDROCHLORIDE 60 MG/1
60 CAPSULE, DELAYED RELEASE ORAL DAILY
Qty: 30 CAPSULE | Refills: 0 | Status: SHIPPED | OUTPATIENT
Start: 2024-05-28 | End: 2024-05-28

## 2024-05-28 NOTE — TELEPHONE ENCOUNTER
Celecoxib    DOS: n/a  Last OV: 5/20/24  Last refill date: 4/30/34 #/refills: 60/0  Upcoming appt:   Future Appointments   Date Time Provider Department Center   6/10/2024 10:00 AM Jorge Toussaint DO [facility] [address]       Order: [id number]  Component  Ref Range & Units 2/21/24 11:19 AM   Glucose  60 - 99 mg/dL 88   Comment: Reference range for fasting plasma glucose only.   Sodium  135 - 145 mmol/L 139   Potassium  3.5 - 5.0 mmol/L 5.0   Chloride  98 - 108 mmol/L 100   Carbon Dioxide  23 - 30 mmol/L 26   Anion Gap  6 - 15 mmol/L 13   Blood Urea Nitrogen  7 - 20 mg/dL 24 High    Creatinine  0.50 - 1.40 mg/dL 0.85   eGFR Estimate, All  >=90 mL/min/1.73 sq. m 107

## 2024-05-28 NOTE — TELEPHONE ENCOUNTER
Received prescription refill form to be completed by physician this is for Duloxetine HCL  60 MG CAP. Placed in 's  folder to be completed and fax back to [phone].

## 2024-05-28 NOTE — TELEPHONE ENCOUNTER
Refill Request    Last GFR: 02/21/2024 - 107. PROTOCOL PASSED.    Medication request: DULOXETINE 60 MG Oral Cap DR Particles . TAKE 1 CAPSULE BY MOUTH EVERY DAY     LOV:4/19/2024 Wiley Escalante DO   Due back to clinic per last office note: Per Dr. Escalante: \"Follow-up after physical therapy as needed.\"  NOV: 7/30/2024 Wiley Escalante DO      [place]: 04[place]29/2024 #30    Urine drug screen (if applicable): n/a  Pain contract: n/a    LOV plan (if weaning or changing medications): Not mentioned in LOV note.

## 2024-05-29 RX ORDER — DULOXETIN HYDROCHLORIDE 60 MG/1
60 CAPSULE, DELAYED RELEASE ORAL DAILY
Qty: 90 CAPSULE | Refills: 2 | Status: SHIPPED | OUTPATIENT
Start: 2024-05-29

## 2024-05-29 NOTE — TELEPHONE ENCOUNTER
Refill Request    Medication request: DULOXETINE 60 MG Oral Cap DR Particles.  TAKE 1 CAPSULE BY MOUTH EVERY DAY      LOV:4/19/2024 Wiley Escalante DO   Due back to clinic per last office note:  The patient will follow up in 2 weeks with a phone call.     NOV: 7/30/2024 Wiley Escalante DO      [place]: 4/29/2024 #30 (30 days)    Urine drug screen (if applicable): N/A  Pain contract: N/A    LOV plan (if weaning or changing medications): not meentioned

## 2024-06-05 ENCOUNTER — MED REC SCAN ONLY (OUTPATIENT)
Dept: PHYSICAL MEDICINE AND REHAB | Facility: CLINIC | Age: 49
End: 2024-06-05

## 2024-06-10 ENCOUNTER — OFFICE VISIT (OUTPATIENT)
Facility: CLINIC | Age: 49
End: 2024-06-10
Payer: COMMERCIAL

## 2024-06-10 VITALS — BODY MASS INDEX: 24.51 KG/M2 | WEIGHT: 191 LBS | HEIGHT: 74 IN

## 2024-06-10 DIAGNOSIS — S32.592D PUBIC RAMUS FRACTURE, LEFT, WITH ROUTINE HEALING, SUBSEQUENT ENCOUNTER: Primary | ICD-10-CM

## 2024-06-10 DIAGNOSIS — R93.7 BONE MARROW EDEMA: ICD-10-CM

## 2024-06-10 DIAGNOSIS — S76.012D MUSCLE STRAIN OF LEFT HIP, SUBSEQUENT ENCOUNTER: ICD-10-CM

## 2024-06-10 DIAGNOSIS — M70.62 TROCHANTERIC BURSITIS OF LEFT HIP: ICD-10-CM

## 2024-06-10 PROCEDURE — 99214 OFFICE O/P EST MOD 30 MIN: CPT | Performed by: FAMILY MEDICINE

## 2024-06-10 NOTE — PROGRESS NOTES
Sports Medicine Clinic Note     Subjective:    Chief Complaint   Patient presents with    Follow - Up     F/u left hip;  Worse or better -  better ,     Interval History: The patient is a 49-year-old male who returns for a follow-up regarding ongoing left-sided hip pain. Since the last visit, he has consulted with his endocrinology team at [facility]. Surveillance labs showed notable elevation in FSH and LH, suggesting a central cause, while peripheral testosterone levels were normal. Plans for brain imaging and other surveillance methods have been discussed. The patient found crutches difficult to tolerate but has been doing well overall. He has been avoiding vigorous exercise and closed chain exercises to offload the hip joint and pelvic structures. He reports no pain or discomfort while ambulating full weight-bearing but continues to refrain from impact activities and sports. He remains active with upper body exercises and low-impact activities. The patient is managing his bone health with endocrinology and is on medical management for phenytoin toxicity-related sequelae. Would like to discuss activities that predispose him to stress fractures for prevention moving forward.    Objective:    Left Hip Examination:    Inspection:  No visible swelling or deformities.  No skin changes or rashes.    Palpation:  No tenderness localized to the left anterior hip and groin. Mild residual trochanteric pain.  No palpable masses or significant warmth.    Range of Motion:  Flexion: 0-120 degrees without pain  Extension: 0-30 degrees without pain  Abduction: 0-45 degrees without pain  Adduction: 0-30 degrees without pain  Internal Rotation: 0-40 degrees without pain  External Rotation: 0-60 degrees without pain    Neurovascular:  Sensation intact bilaterally in the lower limbs.  Pulses palpable, capillary refill time normal.    Special Tests:  Negative JHONATAN (Flexion, Abduction, and External Rotation) test  Negative  FADIR (Flexion, Adduction, and Internal Rotation) test  Negative Renny test for hip flexor tightness    Diagnostic Tests:    Deferred additional imaging at this visit. Consider follow-up imaging if symptoms do not continue to improve.    Assessment:    1. Nondisplaced fracture of the left pubic ramus with associated bone marrow edema.  2. Chronic healed nondisplaced fracture of the right inferior pubic ramus.  3. Strain of the left obturator externus muscle with probable small intramuscular hematoma.  4. Mild left gluteus medius insertional tendinosis and trochanteric bursitis.    Plan:    Additional Imaging/Workup:  Consider follow-up radiographs if symptoms do not continue to improve by next visit.    Therapy:  - Physical therapy with focus on hip stabilization, core strengthening, and pain management strategies to continue.  - Encourage pool-based exercises if available to offload weight from the hip joint.    Medications:  - Continue Tylenol and NSAIDs as needed for pain management.  - Continue Teriparatide as prescribed for bone health.    Bracing/Casting:  - No current bracing needed as patient is tolerating weight-bearing activities without discomfort.    Procedures:  - No procedures indicated at this time. Consider trochanteric steroid injection if pain persists or worsens.    Activity Recommendations:  - Continue to avoid high-impact activities and sports.  - Engage in low-impact activities and upper body exercises.  - Maintain cardiovascular fitness with non-weight bearing activities such as swimming. Medical fitness referral provided.    Follow-Up:  - Follow up in 4-6 weeks to reassess progress and determine if further imaging or adjustments to the treatment plan are necessary. Return sooner if symptoms worsen or new symptoms develop.        Jorge Toussaint DO, CAQSM   Primary Care Sports Medicine    Department of Orthopaedic Surgery  [facility]    [address] [place] [address]  [id number]  [address] [place] [address]    t: [phone]  f: [phone]      [email or web address]

## 2024-06-13 RX ORDER — CELECOXIB 200 MG/1
200 CAPSULE ORAL 2 TIMES DAILY
Qty: 60 CAPSULE | Refills: 0 | Status: SHIPPED | OUTPATIENT
Start: 2024-06-13

## 2024-06-26 ENCOUNTER — TELEPHONE (OUTPATIENT)
Dept: SURGERY | Facility: CLINIC | Age: 49
End: 2024-06-26

## 2024-06-26 NOTE — TELEPHONE ENCOUNTER
RN received a fax information from [facility] questioning the Tamsulosin order, noting that patient is also taking Clyclobenzaprine. \"The use of either alpha 1 adrenergic antagonist or alpha reductase inhibitors indicates that your patient may have BPH and concurrent use of anticholinergic agents can exacerbate BPH symptoms of urinary obstruction and retention. If anticholinergic agents are medically necessary, the use of the lowest effective dose or an agent with limited anticholinergic properties may decrease the incidence of lower urinary tract symptoms.\"    [facility] asking to consider, \" slowly discontinuing the anticholinergic with close monitoring, changing the current medication to an alternative less likely to precipitate lower urinary tract symptoms, and reducing the dose of the anticholinergic agent.\"    Copy forwarded to PA to review.   
not examined

## 2024-07-03 ENCOUNTER — OFFICE VISIT (OUTPATIENT)
Facility: CLINIC | Age: 49
End: 2024-07-03
Payer: COMMERCIAL

## 2024-07-03 VITALS — WEIGHT: 191 LBS | BODY MASS INDEX: 24.51 KG/M2 | HEIGHT: 74 IN

## 2024-07-03 DIAGNOSIS — M25.551 GREATER TROCHANTERIC PAIN SYNDROME OF RIGHT LOWER EXTREMITY: ICD-10-CM

## 2024-07-03 DIAGNOSIS — S32.592D PUBIC RAMUS FRACTURE, LEFT, WITH ROUTINE HEALING, SUBSEQUENT ENCOUNTER: Primary | ICD-10-CM

## 2024-07-03 DIAGNOSIS — M25.552 GREATER TROCHANTERIC PAIN SYNDROME OF LEFT LOWER EXTREMITY: ICD-10-CM

## 2024-07-03 PROCEDURE — 20611 DRAIN/INJ JOINT/BURSA W/US: CPT | Performed by: FAMILY MEDICINE

## 2024-07-03 PROCEDURE — 99214 OFFICE O/P EST MOD 30 MIN: CPT | Performed by: FAMILY MEDICINE

## 2024-07-03 RX ORDER — KETOROLAC TROMETHAMINE 30 MG/ML
30 INJECTION, SOLUTION INTRAMUSCULAR; INTRAVENOUS ONCE
Status: COMPLETED | OUTPATIENT
Start: 2024-07-03 | End: 2024-07-03

## 2024-07-03 RX ORDER — TRIAMCINOLONE ACETONIDE 40 MG/ML
40 INJECTION, SUSPENSION INTRA-ARTICULAR; INTRAMUSCULAR ONCE
Status: COMPLETED | OUTPATIENT
Start: 2024-07-03 | End: 2024-07-03

## 2024-07-03 RX ORDER — FAMOTIDINE 40 MG/1
40 TABLET, FILM COATED ORAL NIGHTLY PRN
COMMUNITY
Start: 2024-06-21

## 2024-07-03 RX ADMIN — TRIAMCINOLONE ACETONIDE 40 MG: 40 INJECTION, SUSPENSION INTRA-ARTICULAR; INTRAMUSCULAR at 10:49:00

## 2024-07-03 RX ADMIN — KETOROLAC TROMETHAMINE 30 MG: 30 INJECTION, SOLUTION INTRAMUSCULAR; INTRAVENOUS at 10:49:00

## 2024-07-03 NOTE — PROGRESS NOTES
Sports Medicine Clinic Note    Subjective:    Chief Complaint: Bilateral trochanteric pain, left greater than right.    Interval History: The patient is a 49-year-old male who returns for a follow-up regarding left-sided hip pain and new onset of right-sided trochanteric pain. Since the last visit, he has continued to manage his bone health with his endocrinology team. He reports a significant improvement in his groin pain, initially thought to be related to a pubic stress fracture. However, he continues to experience persistent bilateral trochanteric pain, with the left side being more symptomatic than the right. He has been following the advised activity modifications and continues to avoid high-impact activities. The patient remains active with upper body exercises and low-impact activities. He is interested in discussing further management options for his trochanteric pain.    Objective:    Bilateral Hip Examination:    Inspection:  - No visible swelling or deformities.  - No skin changes or rashes.    Palpation:  - Tenderness over the greater trochanter bilaterally, more pronounced on the left side.  - No tenderness localized to the anterior hip and groin.  - No palpable masses or significant warmth.    Range of Motion:  - Flexion: 0-120 degrees bilaterally without pain.  - Extension: 0-30 degrees bilaterally without pain.  - Abduction: 0-45 degrees bilaterally without pain.  - Adduction: 0-30 degrees bilaterally without pain.  - Internal Rotation: 0-40 degrees bilaterally without pain.  - External Rotation: 0-60 degrees bilaterally without pain.    Neurovascular:  - Sensation intact bilaterally in the lower limbs.  - Pulses palpable, capillary refill time normal.    Special Tests:  - Negative JHONATAN (Flexion, Abduction, and External Rotation) test bilaterally.  - Negative FADIR (Flexion, Adduction, and Internal Rotation) test bilaterally.  - Negative Renny test for hip flexor tightness  bilaterally.    Diagnostic Tests:    No new testing.    MRI left hip (previous study):  - Nondisplaced fracture of the left inferior pubic ramus with surrounding bone marrow edema.  - Bone marrow edema at the junction of the left superior pubic ramus and anterior left acetabulum, compatible with stress reaction or early developing stress fracture.  - Chronic healed nondisplaced fracture of the right inferior pubic ramus.  - Strain of the left obturator externus muscle with a probable small intramuscular hematoma.  - Mild left gluteus medius insertional tendinosis and trochanteric bursitis.    Assessment:    Persistent bilateral trochanteric bursitis and gluteal tendinopathy.  Improved groin pain likely related to routine healing of the previously diagnosed left pubic stress fracture.    Plan:    Therapy:  - Continue physical therapy with a focus on hip stabilization and pain management.  - Encourage pool-based exercises if available to offload weight from the hip joint.    Medications:  - Continue Tylenol and NSAIDs as needed for pain management.  - Continue Teriparatide as prescribed for bone health.    Bracing/Casting:  - No current bracing or DME needed as the patient is tolerating weight-bearing activities without significant discomfort.    Procedures:  - Bilateral trochanteric bursa injection under ultrasound guidance performed today.    Activity Recommendations:  - Continue to avoid high-impact activities and sports.  - Engage in low-impact activities and upper body exercises.  - Maintain cardiovascular fitness with non-weight-bearing activities such as swimming. Medical fitness referral provided.    Follow-Up:  - Follow-up in 2-4 weeks to reassess progress and determine if further imaging or adjustments to the treatment plan are necessary. Return sooner if symptoms worsen or new symptoms develop.    Ultrasound Guided Procedure Note:    After discussion of the risks and benefits, the patient elected to proceed  with a therapeutic injection into the bilateral superficial trochanteric bursas under US guidance. Confirmed that the patient does not have history of prior adverse reactions, active infections, or relevant allergies. There was no erythema or warmth, and the skin was clear.    For each side:    The skin was sterilized with ChloraPrep. A 22 gauge needle was inserted via inferolateral approach utilizing US for needle guidance and placement. The site was injected with a mixture of 1 mL of kenalog 40 mg/mL, 1 mL of Toradol 30 mg/mL and 1 mL of 1% Lidocaine without Epinephrine. The injection was completed without complication, and a bandage was applied.    The patient tolerated the procedure well and was instructed to avoid strenuous activity for the next 24-48 hours and to use ice or Tylenol for pain as needed. The patient will call immediately with any signs of infection or allergic reaction.    Post-Injection Care: The patient tolerated the procedure well. An occlusive bandage was placed over the injection site. Post-injection care instructions provided to the patient. The patient was asked to avoid strenuous activity and continue to rest the area for 2-3 days before resuming regular activities. Patient advised that the area may be more painful for the first 1-2 days. They can use ice or Tylenol for pain as needed.  Patient was instructed to watch for fever, increased swelling, or persistent pain. The patient will call immediately with any signs of infection or allergic reaction.    Complications: The patient tolerated the procedure well without any complications.        Jorge Toussaint DO, LEEM   Primary Care Sports Medicine    Department of Orthopaedic Surgery  [facility]    [address] [place] [address]  [address] [place] [address]    t: [phone]  f: [phone]      [facility]

## 2024-07-11 ENCOUNTER — MED REC SCAN ONLY (OUTPATIENT)
Dept: PHYSICAL MEDICINE AND REHAB | Facility: CLINIC | Age: 49
End: 2024-07-11

## 2024-07-24 ENCOUNTER — OFFICE VISIT (OUTPATIENT)
Facility: CLINIC | Age: 49
End: 2024-07-24
Payer: COMMERCIAL

## 2024-07-24 VITALS — WEIGHT: 191 LBS | HEIGHT: 74 IN | BODY MASS INDEX: 24.51 KG/M2

## 2024-07-24 DIAGNOSIS — S32.592G: ICD-10-CM

## 2024-07-24 DIAGNOSIS — M25.552 GREATER TROCHANTERIC PAIN SYNDROME OF LEFT LOWER EXTREMITY: ICD-10-CM

## 2024-07-24 DIAGNOSIS — M24.9 DERANGEMENT OF LEFT SHOULDER JOINT: ICD-10-CM

## 2024-07-24 DIAGNOSIS — M25.851 RIGHT HIP IMPINGEMENT SYNDROME: Primary | ICD-10-CM

## 2024-07-24 DIAGNOSIS — M25.551 GREATER TROCHANTERIC PAIN SYNDROME OF RIGHT LOWER EXTREMITY: ICD-10-CM

## 2024-07-24 PROCEDURE — 20611 DRAIN/INJ JOINT/BURSA W/US: CPT | Performed by: FAMILY MEDICINE

## 2024-07-24 PROCEDURE — 99214 OFFICE O/P EST MOD 30 MIN: CPT | Performed by: FAMILY MEDICINE

## 2024-07-24 RX ORDER — KETOROLAC TROMETHAMINE 30 MG/ML
30 INJECTION, SOLUTION INTRAMUSCULAR; INTRAVENOUS ONCE
Status: COMPLETED | OUTPATIENT
Start: 2024-07-24 | End: 2024-07-24

## 2024-07-24 RX ORDER — TRIAMCINOLONE ACETONIDE 40 MG/ML
40 INJECTION, SUSPENSION INTRA-ARTICULAR; INTRAMUSCULAR ONCE
Status: COMPLETED | OUTPATIENT
Start: 2024-07-24 | End: 2024-07-24

## 2024-07-24 RX ADMIN — KETOROLAC TROMETHAMINE 30 MG: 30 INJECTION, SOLUTION INTRAMUSCULAR; INTRAVENOUS at 11:07:00

## 2024-07-24 RX ADMIN — TRIAMCINOLONE ACETONIDE 40 MG: 40 INJECTION, SUSPENSION INTRA-ARTICULAR; INTRAMUSCULAR at 11:07:00

## 2024-07-24 NOTE — PROGRESS NOTES
Sports Medicine Clinic Note    Subjective:    Chief Complaint: Discuss repeat right hip injection and recent surgical discussion at [facility]. Also wants to discuss left shoulder pain.    Interval History: The patient is a 49-year-old male returning for a follow-up visit regarding persistent bilateral hip pain and ongoing concerns about his left shoulder. Since the last visit, he has continued to manage his bone health with his endocrinology team. He reports significant improvement in his groin pain, initially thought to be related to a pubic stress fracture, but still experiences bilateral trochanteric pain, more pronounced on the right side. Groin pain also present with hip flexion on the right, not so much the left. Additionally, he has a long-standing left shoulder pain that has not improved with conservative management despite previous consultation with shoulder specialists in my group. He previously had an articular right hip injection at [facility] in November 2023, which provided significant relief, and he wishes to repeat this treatment. He describes no current pain from the left-sided stress fracture and has been weight-bearing, suggesting a stable injury. Discussed arthroscopy of the right hip at [facility] and continued conservative measures for the left hip, possible arthroscopy down the line for the left but he would like to hold off on any surgery for the time being.    Objective:    Bilateral Hip Examination:    Inspection:  - No visible swelling or deformities.  - No skin changes or rashes.    Palpation:  - Tenderness over the greater trochanter bilaterally, more pronounced on the right side.  - No tenderness localized to the anterior hip and groin clinically.  - No palpable masses or significant warmth.    Range of Motion:  - Flexion: 0-120 degrees bilaterally without pain.  - Extension: 0-30 degrees bilaterally without pain.  - Abduction: 0-45 degrees bilaterally without pain.  - Adduction: 0-30 degrees  bilaterally without pain.  - Internal Rotation: 0-40 degrees bilaterally without pain.  - External Rotation: 0-60 degrees bilaterally without pain.    Neurovascular:  - Sensation intact bilaterally in the lower limbs.  - Pulses palpable, capillary refill time normal.    Special Tests:  - Positive JHONATAN test bilaterally.  - Positive Scour test bilaterally.  - Positive FADIR test bilaterally.  - Negative Renny test for hip flexor tightness bilaterally.    Left Shoulder Examination:    Inspection:  - No visible swelling or deformities.  - No skin changes or rashes.    Palpation:  - Tenderness over the acromioclavicular joint and subacromial space.  - No tenderness over the biceps tendon or anterior glenohumeral joint.    Range of Motion:  - Flexion: 0-180 degrees with discomfort.  - Abduction: 0-90 degrees with discomfort.  - Internal Rotation: 0-70 degrees with discomfort.  - External Rotation: 0-90 degrees with discomfort.    Neurovascular:  - Grossly intact    Diagnostic Tests:    Updated MRI left hip:  - Reviewed, showing evidence of nondisplaced inferior pubic rami fracture with ongoing surrounding edema, and labral tear/ROBERT. Otherwise, no other soft tissue abnormalities.    MRI of the right hip:  - Acetabular labral tear and reconfirming femoral acetabular impingement and healed pubic ramus fracture from 1 year ago, no current bone edema, or new fracture.  - No evidence of cartilage degeneration, stress fracture, avascular necrosis, or surrounding muscle/tendon tears.    Assessment:    Improved groin pain likely related to ongoing but delayed of the previously diagnosed left pubic stress fracture.  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  Left shoulder suspected rotator cuff syndrome and acromioclavicular arthropathy.    Plan:    Therapy:  - Continue physical therapy with a focus on hip stabilization and pain management.  - Encourage pool-based exercises if available to offload  weight from the hip joint.  - Avoid high-impact activities to allow the left pubic ramus fracture to heal.    Medications:  - Continue Tylenol and NSAIDs as needed for pain management.  - Continue Teriparatide as prescribed for bone health.    Procedures:  - Proceed with a right hip articular injection today under ultrasound guidance.    Activity Recommendations:  - Continue to avoid high-impact activities and sports.  - Engage in low-impact activities and upper body exercises.  - Maintain cardiovascular fitness with non-weight-bearing activities such as swimming.    Additional Workup:  - Order MRI of the left shoulder to evaluate for rotator cuff pathology and acromioclavicular arthropathy.    Follow-Up:  - Follow-up in 2-4 weeks to reassess hip and shoulder progress and determine if further imaging or adjustments to the treatment plan are necessary. Return sooner if symptoms worsen or new symptoms develop.  - Follow up separately for the left shoulder after MRI results.    Ultrasound Guided Procedure Note:    After discussion of the risks and benefits, the patient elected to proceed with a therapeutic injection into the right hip (femoroacetabular) under US guidance. Confirmed that the patient does not have history of prior adverse reactions, active infections, or relevant allergies. There was no erythema or warmth, and the skin was clear.    The skin was sterilized with ChloraPrep. A 22 gauge needle was inserted via inferolateral approach utilizing US for needle guidance and placement. The site was injected with a mixture of 1 mL of kenalog 40 mg/mL, 1 mL of Toradol 30 mg/mL and 1 mL of 1% Lidocaine without Epinephrine. The injection was completed without complication, and a bandage was applied.    The patient tolerated the procedure well and was instructed to avoid strenuous activity for the next 24-48 hours and to use ice or Tylenol for pain as needed. The patient will call immediately with any signs of  infection or allergic reaction.    Post-Injection Care: The patient tolerated the procedure well. An occlusive bandage was placed over the injection site. Post-injection care instructions provided to the patient. The patient was asked to avoid strenuous activity and continue to rest the area for 2-3 days before resuming regular activities. Patient advised that the area may be more painful for the first 1-2 days. They can use ice or Tylenol for pain as needed.  Patient was instructed to watch for fever, increased swelling, or persistent pain. The patient will call immediately with any signs of infection or allergic reaction.    Complications: The patient tolerated the procedure well without any complications.        Jorge Toussaint DO, CAQSM   Primary Care Sports Medicine    Department of Orthopaedic Surgery  [facility]    [address] [place] [id number]  [address] [place] [address]    t: [phone]  f: [phone]      [email or web address]

## 2024-07-30 ENCOUNTER — PATIENT MESSAGE (OUTPATIENT)
Dept: FAMILY MEDICINE CLINIC | Facility: CLINIC | Age: 49
End: 2024-07-30

## 2024-07-30 DIAGNOSIS — M25.50 MULTIPLE JOINT PAIN: Primary | ICD-10-CM

## 2024-07-31 NOTE — TELEPHONE ENCOUNTER
From: Arron Gutierrez  To: Pablito Knight  Sent: 7/30/2024 1:22 PM CDT  Subject: Referral     I need a referral to rheumatologist. Dr Shayan Moulton at [facility]. He goes to [place]. Thanks.

## 2024-08-05 ENCOUNTER — OFFICE VISIT (OUTPATIENT)
Dept: PODIATRY CLINIC | Facility: CLINIC | Age: 49
End: 2024-08-05
Payer: COMMERCIAL

## 2024-08-05 DIAGNOSIS — M19.071 ARTHROSIS OF MIDFOOT, RIGHT: ICD-10-CM

## 2024-08-05 DIAGNOSIS — B35.1 ONYCHOMYCOSIS: Primary | ICD-10-CM

## 2024-08-05 DIAGNOSIS — M25.571 ARTHRALGIA OF RIGHT FOOT: ICD-10-CM

## 2024-08-05 PROCEDURE — 87101 SKIN FUNGI CULTURE: CPT | Performed by: PODIATRIST
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
## 2024-08-06 DIAGNOSIS — M70.62 TROCHANTERIC BURSITIS OF LEFT HIP: ICD-10-CM

## 2024-08-06 DIAGNOSIS — S32.592D PUBIC RAMUS FRACTURE, LEFT, WITH ROUTINE HEALING, SUBSEQUENT ENCOUNTER: ICD-10-CM

## 2024-08-06 DIAGNOSIS — R93.7 BONE MARROW EDEMA: ICD-10-CM

## 2024-08-06 DIAGNOSIS — S76.012D MUSCLE STRAIN OF LEFT HIP, SUBSEQUENT ENCOUNTER: ICD-10-CM

## 2024-08-06 RX ORDER — CELECOXIB 200 MG/1
200 CAPSULE ORAL 2 TIMES DAILY
Qty: 60 CAPSULE | Refills: 0 | Status: SHIPPED | OUTPATIENT
Start: 2024-08-06

## 2024-08-06 NOTE — TELEPHONE ENCOUNTER
Celecoxib    DOS: n/a  Last OV: 7/24/24  Last refill date: 6/27/24 #/refills: 60/0  Upcoming appt:   Future Appointments   Date Time Provider Department Center   8/9/2024 10:20 AM [facility]ante[id number]   8/12/2024 11:00 AM Pablito Knight MD [facility] [address] [facility] [address]   8/14/2024  9:30 AM Jorge Toussaint, DO [facility] ORTHOP[facility] [address]   9/30/2024 10:00 AM Pablito Knight MD [facility] [address] [facility] [address]   11/20/2024  8:30 AM Marvin Catalan MD [facility]   5/1/2025 11:00 AM Carlos Toledo MD [id number] [address]       Order: [id number]  Component  Ref Range & Units 7/26/24  8:48 AM   Glucose  60 - 99 mg/dL 111 High    Comment: Reference range for fasting plasma glucose only.   Sodium  135 - 145 mmol/L 137   Potassium  3.5 - 5.0 mmol/L 4.3   Chloride  98 - 108 mmol/L 101   Carbon Dioxide  23 - 30 mmol/L 27   Anion Gap  6 - 15 mmol/L 9   Blood Urea Nitrogen  7 - 20 mg/dL 25 High    Creatinine  0.50 - 1.40 mg/dL 0.77   eGFR Estimate, All  >=90 mL/min/1.73 sq. m 110

## 2024-08-09 ENCOUNTER — OFFICE VISIT (OUTPATIENT)
Dept: PHYSICAL MEDICINE AND REHAB | Facility: CLINIC | Age: 49
End: 2024-08-09
Payer: COMMERCIAL

## 2024-08-09 VITALS — HEIGHT: 74 IN | WEIGHT: 191 LBS | BODY MASS INDEX: 24.51 KG/M2

## 2024-08-09 DIAGNOSIS — M79.18 MYOFASCIAL PAIN: ICD-10-CM

## 2024-08-09 DIAGNOSIS — M75.111 PARTIAL NONTRAUMATIC TEAR OF RIGHT ROTATOR CUFF: Primary | ICD-10-CM

## 2024-08-09 PROCEDURE — 99213 OFFICE O/P EST LOW 20 MIN: CPT | Performed by: PHYSICAL MEDICINE & REHABILITATION

## 2024-08-09 NOTE — PATIENT INSTRUCTIONS
If the right shoulder keeps bothering you after your first hip surgery either come see myself or Dr. Hernandez for follow up.    We could also consider trigger point injections in the back, in the future if the muscles remain tight.

## 2024-08-09 NOTE — PROGRESS NOTES
Progress note    C/C:   Chief Complaint   Patient presents with    Follow - Up     INJ: 4/19/2024 pt comes in to f/u on left hip joint steroid injection.denies improvement. States he is scheduled for both hip surgeries with [facility]. States he is seeing Dr. Toussaint for treatment of a L shoulder tear.       HPI: 49-year-old male presents for follow-up. He is going to have arthroscopic hip surgery with Dr. Farzad Collazo within the next 2 weeks.     Increased right shoulder pain attempting to play tennis. Has had difficulty lifting the right arm overhead. Made the appointment due to the increased right shoulder pain; significant relief of similar symptoms with right subacromial bursa steroid injection done about 2 years ago.     Since the last time I saw him he was also seen by sports medicine physician Dr. Hernandez; has a left labral tear.     He was also seen by rheumatology at an outside institution, who put him on a tapering dose of prednisone, which he is still taking.     He continues to take duloxetine, which he has found to be particularly helpful for his knees. Also takes the duloxetine for polyneuropathy.     Pertinent allergies:   Allergies   Allergen Reactions    Carbamazepine FEVER, FACE FLUSHING and ITCHING      Physical exam:  Ht 74\"   Wt 191 lb (86.6 kg)   BMI 24.52 kg/m²      PE right shoulder exam:    Range of motion:  Forward flexion: 160 degrees, end range pain  Abduction: 140 degrees active, end range pain, 160 degrees passive  Internal rotation: L1  External rotation: mild restriction to PROM    Provocative test:  Supraspinatus test: +  Hawkin's test: +  Neer's test: +  AC joint tenderness: -  Cross arm adduction test: -    Imaging: No new imaging of the right shoulder to review    Assessment and plan  Partial right atraumatic RTC tear  ROBERT, left hip labral tear  Neuropathic pain    Recommend he continue with prednisone taper; he is scheduled for surgery within the next 2 weeks, defer right  subacromial bursa steroid injection under US guidance. If still symptomatic after his surgery we can revisit this in the future; he may see either myself or Dr. Hernandez for this. Continue duloxetine as prescribed.     Wiley Escalante DO  Physical Medicine and Rehabilitation  [facility]

## 2024-08-12 ENCOUNTER — OFFICE VISIT (OUTPATIENT)
Dept: FAMILY MEDICINE CLINIC | Facility: CLINIC | Age: 49
End: 2024-08-12
Payer: COMMERCIAL

## 2024-08-12 ENCOUNTER — LAB ENCOUNTER (OUTPATIENT)
Dept: LAB | Age: 49
End: 2024-08-12
Attending: FAMILY MEDICINE
Payer: COMMERCIAL

## 2024-08-12 ENCOUNTER — EKG ENCOUNTER (OUTPATIENT)
Dept: LAB | Age: 49
End: 2024-08-12
Attending: FAMILY MEDICINE
Payer: COMMERCIAL

## 2024-08-12 VITALS
DIASTOLIC BLOOD PRESSURE: 70 MMHG | HEIGHT: 74 IN | RESPIRATION RATE: 16 BRPM | TEMPERATURE: 98 F | WEIGHT: 191 LBS | BODY MASS INDEX: 24.51 KG/M2 | OXYGEN SATURATION: 98 % | SYSTOLIC BLOOD PRESSURE: 122 MMHG | HEART RATE: 74 BPM

## 2024-08-12 DIAGNOSIS — Z01.818 PREOPERATIVE CLEARANCE: ICD-10-CM

## 2024-08-12 DIAGNOSIS — Z01.818 PREOPERATIVE CLEARANCE: Primary | ICD-10-CM

## 2024-08-12 DIAGNOSIS — M80.80XD OTHER OSTEOPOROSIS WITH CURRENT PATHOLOGICAL FRACTURE WITH ROUTINE HEALING, SUBSEQUENT ENCOUNTER: ICD-10-CM

## 2024-08-12 PROBLEM — M80.00XD OSTEOPOROSIS WITH CURRENT PATHOLOGICAL FRACTURE WITH ROUTINE HEALING: Status: ACTIVE | Noted: 2024-03-26

## 2024-08-12 LAB
ALBUMIN SERPL-MCNC: 4.5 G/DL (ref 3.2–4.8)
ALBUMIN/GLOB SERPL: 1.7 {RATIO} (ref 1–2)
ALP LIVER SERPL-CCNC: 79 U/L
ALT SERPL-CCNC: 31 U/L
ANION GAP SERPL CALC-SCNC: 7 MMOL/L (ref 0–18)
AST SERPL-CCNC: 29 U/L (ref ?–34)
BASOPHILS # BLD AUTO: 0.05 X10(3) UL (ref 0–0.2)
BASOPHILS NFR BLD AUTO: 0.6 %
BILIRUB SERPL-MCNC: 0.5 MG/DL (ref 0.3–1.2)
BILIRUB UR QL STRIP.AUTO: NEGATIVE
BUN BLD-MCNC: 22 MG/DL (ref 9–23)
CALCIUM BLD-MCNC: 10 MG/DL (ref 8.7–10.4)
CHLORIDE SERPL-SCNC: 107 MMOL/L (ref 98–112)
CLARITY UR REFRACT.AUTO: CLEAR
CO2 SERPL-SCNC: 25 MMOL/L (ref 21–32)
CREAT BLD-MCNC: 0.83 MG/DL
EGFRCR SERPLBLD CKD-EPI 2021: 107 ML/MIN/1.73M2 (ref 60–?)
EOSINOPHIL # BLD AUTO: 0.16 X10(3) UL (ref 0–0.7)
EOSINOPHIL NFR BLD AUTO: 1.8 %
ERYTHROCYTE [DISTWIDTH] IN BLOOD BY AUTOMATED COUNT: 13.8 %
FASTING STATUS PATIENT QL REPORTED: YES
GLOBULIN PLAS-MCNC: 2.7 G/DL (ref 2–3.5)
GLUCOSE BLD-MCNC: 89 MG/DL (ref 70–99)
GLUCOSE UR STRIP.AUTO-MCNC: NORMAL MG/DL
HCT VFR BLD AUTO: 39 %
HGB BLD-MCNC: 13 G/DL
IMM GRANULOCYTES # BLD AUTO: 0.03 X10(3) UL (ref 0–1)
IMM GRANULOCYTES NFR BLD: 0.3 %
INR BLD: 0.93 (ref 0.8–1.2)
KETONES UR STRIP.AUTO-MCNC: NEGATIVE MG/DL
LEUKOCYTE ESTERASE UR QL STRIP.AUTO: NEGATIVE
LYMPHOCYTES # BLD AUTO: 2.49 X10(3) UL (ref 1–4)
LYMPHOCYTES NFR BLD AUTO: 28.6 %
MCH RBC QN AUTO: 32.8 PG (ref 26–34)
MCHC RBC AUTO-ENTMCNC: 33.3 G/DL (ref 31–37)
MCV RBC AUTO: 98.5 FL
MONOCYTES # BLD AUTO: 1.02 X10(3) UL (ref 0.1–1)
MONOCYTES NFR BLD AUTO: 11.7 %
NEUTROPHILS # BLD AUTO: 4.96 X10 (3) UL (ref 1.5–7.7)
NEUTROPHILS # BLD AUTO: 4.96 X10(3) UL (ref 1.5–7.7)
NEUTROPHILS NFR BLD AUTO: 57 %
NITRITE UR QL STRIP.AUTO: NEGATIVE
OSMOLALITY SERPL CALC.SUM OF ELEC: 291 MOSM/KG (ref 275–295)
PH UR STRIP.AUTO: 5 [PH] (ref 5–8)
PLATELET # BLD AUTO: 280 10(3)UL (ref 150–450)
POTASSIUM SERPL-SCNC: 4.2 MMOL/L (ref 3.5–5.1)
PROT SERPL-MCNC: 7.2 G/DL (ref 5.7–8.2)
PROT UR STRIP.AUTO-MCNC: NEGATIVE MG/DL
PROTHROMBIN TIME: 12.5 SECONDS (ref 11.6–14.8)
RBC # BLD AUTO: 3.96 X10(6)UL
RBC UR QL AUTO: NEGATIVE
SODIUM SERPL-SCNC: 139 MMOL/L (ref 136–145)
SP GR UR STRIP.AUTO: 1.02 (ref 1–1.03)
UROBILINOGEN UR STRIP.AUTO-MCNC: NORMAL MG/DL
WBC # BLD AUTO: 8.7 X10(3) UL (ref 4–11)

## 2024-08-12 PROCEDURE — 80053 COMPREHEN METABOLIC PANEL: CPT | Performed by: FAMILY MEDICINE

## 2024-08-12 PROCEDURE — 85025 COMPLETE CBC W/AUTO DIFF WBC: CPT | Performed by: FAMILY MEDICINE

## 2024-08-12 PROCEDURE — 36415 COLL VENOUS BLD VENIPUNCTURE: CPT | Performed by: FAMILY MEDICINE

## 2024-08-12 PROCEDURE — 85610 PROTHROMBIN TIME: CPT | Performed by: FAMILY MEDICINE

## 2024-08-12 PROCEDURE — 81003 URINALYSIS AUTO W/O SCOPE: CPT | Performed by: FAMILY MEDICINE

## 2024-08-12 PROCEDURE — 99214 OFFICE O/P EST MOD 30 MIN: CPT | Performed by: FAMILY MEDICINE

## 2024-08-12 RX ORDER — HYDROCODONE BITARTRATE AND ACETAMINOPHEN 10; 325 MG/1; MG/1
TABLET ORAL
COMMUNITY
Start: 2024-08-12

## 2024-08-12 RX ORDER — PREDNISONE 5 MG/1
TABLET ORAL
COMMUNITY
Start: 2024-08-01

## 2024-08-12 RX ORDER — HYDROCODONE BITARTRATE AND ACETAMINOPHEN 5; 325 MG/1; MG/1
1 TABLET ORAL
COMMUNITY
Start: 2024-08-12

## 2024-08-12 RX ORDER — INDOMETHACIN 75 MG/1
75 CAPSULE, EXTENDED RELEASE ORAL
COMMUNITY
Start: 2024-08-12

## 2024-08-12 RX ORDER — ONDANSETRON 4 MG/1
TABLET, ORALLY DISINTEGRATING ORAL
COMMUNITY
Start: 2024-08-12

## 2024-08-12 RX ORDER — DOCUSATE SODIUM 100 MG/1
1 CAPSULE, LIQUID FILLED ORAL DAILY
COMMUNITY
Start: 2024-08-12
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
## 2024-08-12 NOTE — PATIENT INSTRUCTIONS
Considerations in the Preoperative period:   Surgery is a big deal.  Anesthesia and your medicines and medical issues all stress out your body.  Therefore read about the below issues and ask questions.(Certain exceptions exist depending on your medical history, call the doctor back with questions or contact your specialist doctors as well)    Medications:  Medications that thin the blood pose a bleeding risk- No NSAIDS/nonsteroidal anti inflammatory drugs- meaning motrin, advil, ibuprofen, alleve, naproxen, or aspirin 1 week prior to surgery, but acetaminophen/tylenol for pain is ok.      Herbals: Ephedra, garlic, ginkgo, ginseng, kava, St. Johns Wort, and Valerian, should be held 3-5 days prior to surgery.    Cardiovascular agents:   Ace Inhibitors (lisinopril, benazepril, ramipril etc.) or ARBs (diovan, valsartan, losartan, avapro etc.) should be held night before surgery if BP is good.    Non statin cholesterol agents (fenofibrate, fish oils, niacin, zetia) held day before surgery.  Statins are continued during surgery.    Diuretics hold on day of surgery.    Endocrine Hormonal Agents:  Oral Contraception, Birth Control, Hormone replacement therapy, and Estrogen Receptor Blockers (raloxifene, tamoxifen)- Hold 4-6 weeks before high risk surgeries with high risk of blood clots (venous thromboembolism)    Insulin and steroids- case by case basis- but your doses may have to be adjusted sp talk to the doctor about it.  Short acting insulin novolog humalog is held during surgery, Long acting is typically decreased before and during surgery.    Blood Thinners:  Plavix, clopidegrel, prasugrel, ticagrelor, effient etc- held 7 days before unless directed differently by cardiology    Coumadin, or novel anticoagulants such as pradaxa, eliquis, xarelto etc.- discuss with your doctor or cardiologist    Psychotropic agents:  Serotinin reuptake inhibitors like zoloft, effexor, paxil, prozac, citalopram, remeron etc. For mood  should be held 3 weeks before surgery if high risk of bleeding as these may increase risk of bleeding  Most other psychiatric meds like antipsychotic meds, anxiety meds such as xanax, clonopin can be continued    Opioids: Will affect anesthesia and pain management discuss with the anesthesiologist.    Antiretroviral agents such as those used for HIV need to be discussed    Neurologic Agents:  Patients with medications for seizures, Parkinson's, delirium, or Myasthenia gravis pose risks that need to be discussed with the doctor.    Rheumatologic Immune Agents: Talk to your specialist doctor because  Sulfasalazine, azathioprine held 1 week before surgery  Leflunomide is held 2 weeks before surgery  etanercept, infliximab, anakinra, rituximab, adalimumab - held before surgery talk to the doctor about these  Gout meds such as allopurinol, colchicine are held on morning of surgery.    Medical Conditions needing evaluation and discussion with your doctors and specialists:  Cardiac conditions such as Blood pressure, coronary artery disease, heart failure, or irregular heart beats.    Pulmonary conditions such as asthma, COPD, or sleep apnea    Hormonal conditions such as diabetes and hormone replacement see above    Neurologic conditions as noted above Parkinsons, Myasthenia gravis, seizures to name a few need to have special consideration    Rheumatologic/Autoimmune disease need to be specially addressed and any condition that would impair healing such as HIV or history of splenectomy

## 2024-08-13 ENCOUNTER — PATIENT MESSAGE (OUTPATIENT)
Dept: FAMILY MEDICINE CLINIC | Facility: CLINIC | Age: 49
End: 2024-08-13

## 2024-08-13 LAB
ATRIAL RATE: 65 BPM
P AXIS: 64 DEGREES
P-R INTERVAL: 142 MS
Q-T INTERVAL: 394 MS
QRS DURATION: 84 MS
QTC CALCULATION (BEZET): 409 MS
R AXIS: 36 DEGREES
T AXIS: 24 DEGREES
VENTRICULAR RATE: 65 BPM

## 2024-08-13 NOTE — TELEPHONE ENCOUNTER
From: Arron Gutierrez  To: Pablito Knight  Sent: 8/13/2024 12:00 PM CDT  Subject: Bloodwork     Did you send to [facility] as well? I received a message from [facility] that I needed to do some test but it doesn’t say who ordered or what it’s for.

## 2024-08-14 ENCOUNTER — OFFICE VISIT (OUTPATIENT)
Facility: CLINIC | Age: 49
End: 2024-08-14
Payer: COMMERCIAL

## 2024-08-14 ENCOUNTER — PATIENT MESSAGE (OUTPATIENT)
Facility: CLINIC | Age: 49
End: 2024-08-14

## 2024-08-14 VITALS — HEIGHT: 74 IN | WEIGHT: 191 LBS | BODY MASS INDEX: 24.51 KG/M2

## 2024-08-14 DIAGNOSIS — M54.16 LUMBAR RADICULOPATHY: ICD-10-CM

## 2024-08-14 DIAGNOSIS — M19.012 ARTHROSIS OF LEFT ACROMIOCLAVICULAR JOINT: Primary | ICD-10-CM

## 2024-08-14 DIAGNOSIS — M24.112 DEGENERATIVE TEAR OF GLENOID LABRUM OF LEFT SHOULDER: ICD-10-CM

## 2024-08-14 DIAGNOSIS — M75.52 SUBACROMIAL BURSITIS OF LEFT SHOULDER JOINT: ICD-10-CM

## 2024-08-14 PROCEDURE — 20606 DRAIN/INJ JOINT/BURSA W/US: CPT | Performed by: FAMILY MEDICINE

## 2024-08-14 PROCEDURE — 20611 DRAIN/INJ JOINT/BURSA W/US: CPT | Performed by: FAMILY MEDICINE

## 2024-08-14 PROCEDURE — 99214 OFFICE O/P EST MOD 30 MIN: CPT | Performed by: FAMILY MEDICINE

## 2024-08-14 RX ORDER — KETOROLAC TROMETHAMINE 30 MG/ML
30 INJECTION, SOLUTION INTRAMUSCULAR; INTRAVENOUS ONCE
Status: COMPLETED | OUTPATIENT
Start: 2024-08-14 | End: 2024-08-14

## 2024-08-14 RX ORDER — TRIAMCINOLONE ACETONIDE 40 MG/ML
40 INJECTION, SUSPENSION INTRA-ARTICULAR; INTRAMUSCULAR ONCE
Status: COMPLETED | OUTPATIENT
Start: 2024-08-14 | End: 2024-08-14

## 2024-08-14 RX ADMIN — KETOROLAC TROMETHAMINE 30 MG: 30 INJECTION, SOLUTION INTRAMUSCULAR; INTRAVENOUS at 10:43:00

## 2024-08-14 RX ADMIN — TRIAMCINOLONE ACETONIDE 40 MG: 40 INJECTION, SUSPENSION INTRA-ARTICULAR; INTRAMUSCULAR at 10:43:00

## 2024-08-14 NOTE — PROGRESS NOTES
Sports Medicine Clinic Note    Subjective:    Chief Complaint: Follow-up for left shoulder pain.    Interval History: The patient is a 49-year-old male returning for follow-up regarding his left shoulder. He previously reported persistent left shoulder pain that has not improved despite conservative measures. Since the last visit, he underwent an MRI of the left shoulder. The patient also mentions chronic low back pain, with a history of lumbar spondylosis and facet arthrosis, as seen on a prior MRI from 2021. He reports no significant change in his low back symptoms but is interested in updating his lumbar spine imaging to reassess his condition.    Objective:    Left Shoulder Examination:    Inspection:  - No visible swelling or deformities.  - No skin changes or rashes.    Palpation:  - Tenderness over the acromioclavicular joint and anterior glenohumeral joint.  - No tenderness over the biceps tendon or subacromial space .    Range of Motion:  - Flexion: 0-180 degrees with discomfort.  - Abduction: 0-90 degrees with discomfort.  - Internal Rotation: 0-70 degrees with discomfort.  - External Rotation: 0-90 degrees with discomfort.    Neurovascular:  - Grossly intact.    Diagnostic Tests:    MRI of the left shoulder was reviewed and revealed the following findings:  - Subtle posterior superior labral tear.  - Mild subacromial/subdeltoid bursitis.  - Mild degenerative changes at the acromioclavicular joint with capsular hypertrophy.  - Mild reactive cystic changes within the superolateral humeral head.  - No rotator cuff tear, but mild fluid in the subacromial/subdeltoid bursa.  - Questionable subtle edema in the teres minor muscle, possibly related to a strain or denervation.    Assessment:    1. Posterior superior labral tear, left shoulder.  2. Subacromial/subdeltoid bursitis, left shoulder, not as clinically relevant.  3. Degenerative acromioclavicular joint disease, left shoulder.  4. Chronic low back pain  with a history of lumbar spondylosis and facet arthrosis.    Plan:    Imaging: Update MRI of the lumbar spine to reassess the extent of lumbar spondylosis and any associated stenosis or nerve impingement.    Therapy: Continue physical therapy for shoulder stabilization, focusing on rotator cuff strengthening and scapular stabilization exercises. Recommend continuing core strengthening exercises for low back pain management.    Medications: Continue the current regimen of Tylenol and NSAIDs as needed for pain management. Consider initiating a muscle relaxant for low back pain if spasms are present.    Procedures: Perform a therapeutic injection of the left acromioclavicular joint and glenohumeral joint under ultrasound guidance.    Activity Recommendations: Advise the patient to avoid overhead activities that exacerbate shoulder pain. Encourage low-impact exercises that do not strain the shoulder. For the low back, advise avoiding heavy lifting and prolonged bending or twisting.    Follow-Up: Follow-up to review the updated MRI of the lumbar spine. Return sooner if symptoms worsen or new symptoms develop.    Ultrasound Guided Procedure Note (Multiple Procedures):    Procedure Details:  Procedures: Therapeutic injections into the left glenohumeral and acromioclavicular joints  Guidance Method: Ultrasound guidance    Pre-Procedure Evaluation: Discussed risks, benefits, and alternatives with the patient. The patient elected to proceed. Confirmed absence of prior adverse reactions, active infections, or relevant allergies. Pre-procedure skin assessment: No erythema, warmth, or abnormalities noted.     Preparation and Sterilization: Skin sterilized with ChloraPrep at the intended injection sites.    Procedure Techniques:  Needle Type: 22 gauge  Approach: Utilizing US for needle guidance and placement.  Injectate: A mixture of 1 mL of kenalog 40 mg/mL, 1 mL of Toradol 30 mg/mL and 1 mL of 1% Lidocaine without Epinephrine,  administered at the glenohumeral joint site. A mixture of 1 mL of kenalog 40 mg/mL and 0.5 mL of 1% Lidocaine without Epinephrine, administered at the acromioclavicular joint site.   Completion: The procedures were completed without complication; and occlusive bandages were applied post-injections.    Post-Procedure Instructions:   Advised the patient to avoid strenuous activity for 24-48 hours.  Pain management: Use ice or Tylenol as needed.  Monitoring: Patient instructed to observe for signs of infection or allergic reaction (e.g., fever, increased swelling, persistent pain) and to contact the clinic immediately if any such signs occur.    Follow-Up: Instructions: Rest the treated area for 2-3 days before resuming regular activities. The area may be more painful for the first 1-2 days post-procedure.    Complications: The patient tolerated the procedures well with no immediate complications noted.        Jorge Toussaint DO, LEEM   Primary Care Sports Medicine    Department of Orthopaedic Surgery  [facility]    [address] [place] [address]  [address] [place] [address]    t: [phone]  f: [phone]      [email or web address]

## 2024-08-15 ENCOUNTER — PATIENT MESSAGE (OUTPATIENT)
Dept: FAMILY MEDICINE CLINIC | Facility: CLINIC | Age: 49
End: 2024-08-15

## 2024-08-15 NOTE — TELEPHONE ENCOUNTER
From: Arron Gutierrez  To: Jorge BIGGS  Sent: 8/14/2024 7:59 PM CDT  Subject: Dermatologist     Thanks for your time today. My skin has had issues for 3 years. I recently had my nipples get irritated from using a recumbent bike and walking. Let me know if you have a good dermatologist. Thanks again.

## 2024-08-15 NOTE — TELEPHONE ENCOUNTER
From: Arron Gutierrez  To: Pablito Knight  Sent: 8/15/2024 2:39 PM CDT  Subject: EKG    My surgeon needs the results ASAP. Please let me know when sent

## 2024-08-18 ENCOUNTER — PATIENT MESSAGE (OUTPATIENT)
Dept: FAMILY MEDICINE CLINIC | Facility: CLINIC | Age: 49
End: 2024-08-18

## 2024-08-19 NOTE — TELEPHONE ENCOUNTER
From: Arron Gutierrez  To: Pablito Knight  Sent: 8/18/2024 7:49 PM CDT  Subject: Sleep help    I haven’t slept through the night in a few years. Even with muscle relaxers. I’ve tried Tylenol PM but read I shouldn’t take regularly. Let me know if you are able to prescribe anything. Thanks so much for your help. Arron

## 2024-08-27 ENCOUNTER — HOSPITAL ENCOUNTER (OUTPATIENT)
Dept: GENERAL RADIOLOGY | Age: 49
Discharge: HOME | End: 2024-08-27
Attending: PODIATRIST
Payer: COMMERCIAL

## 2024-08-27 ENCOUNTER — OFFICE VISIT (OUTPATIENT)
Dept: ORTHOPEDICS CLINIC | Facility: CLINIC | Age: 49
End: 2024-08-27
Payer: COMMERCIAL

## 2024-08-27 DIAGNOSIS — M85.879 OSTEOPENIA OF FOOT, UNSPECIFIED LATERALITY: ICD-10-CM

## 2024-08-27 DIAGNOSIS — M79.672 LEFT FOOT PAIN: ICD-10-CM

## 2024-08-27 DIAGNOSIS — M79.672 LEFT FOOT PAIN: Primary | ICD-10-CM

## 2024-08-27 PROCEDURE — 99214 OFFICE O/P EST MOD 30 MIN: CPT | Performed by: PODIATRIST

## 2024-08-27 PROCEDURE — 73630 X-RAY EXAM OF FOOT: CPT | Performed by: PODIATRIST

## 2024-08-27 RX ORDER — ASPIRIN 325 MG
325 TABLET ORAL DAILY
COMMUNITY

## 2024-08-27 NOTE — PROGRESS NOTES
[facility] Progress Note    Subjective:   Arron is here with a new issue today  He was trying to get some exercise and was hitting some tennis balls  Left foot pain  Feels similar to right foot recent pain at cuboid area  Still hurts with WB  Same issue right foot has resolved  Hx  phenytoin toxicity      Objective:     Tender over sinus tarsi left foot  Mild discomfort with STJ range of motion, more w inversion  No swelling  Sensation is intact sharp versus dull.  She can feel light touch to the tips of the toes  Palpable pedal pulses.  Hair growth is present.  Skin is supple and feet are well perfused and warm.    Strength is 5 out of 5 all muscle groups    Full range of motion and nonpainful motion of the ankle joint,  MP joints, and midfoot joints.           Imaging: osteopenic bone  No fracture.  Possibly early OAD calcaneal cuboid joint- sinus tarsi 3 views left foot        Assessment/Plan:     Diagnoses and all orders for this visit:    Left foot pain  -     XR FOOT WEIGHTBEARING (3 VIEWS), LEFT (CPT=73630); Future    Osteopenia of foot, unspecified laterality        He seems to have some mild sinus tarsitis but without swelling  Xrays were reviewed and are normal with exception of osteopenic bone which is a known problem for Arron  His Dexa scan apparently has improved.  This is encouraging    Discussed shoes, arch supports, icing area  Follow up for steroid injection if not improving  He also sees Dr Toussaint soon for another issue and could have injection with him as well        Toshia Thomson, DPMPodiatric Surgery  [facility]  [address], [address] [address]   [address] [place], [place] [address] [place] [email or web address]  [email or web address]  t: [phone]   f: [phone]            Dragon speech recognition software was used to prepare this note. If a word or phrase is confusing, it is likely do to a failure of recognition. Please contact me with any  questions or clarifications.

## 2024-08-28 ENCOUNTER — TELEPHONE (OUTPATIENT)
Dept: PODIATRY CLINIC | Facility: CLINIC | Age: 49
End: 2024-08-28

## 2024-08-28 NOTE — TELEPHONE ENCOUNTER
Please fill patient's Lamisil prescription.  Lamisil 250 mg tablets  Dispense 45 no refills  Take 1 tablet daily p.o.  Follow-up in 6 weeks

## 2024-08-28 NOTE — TELEPHONE ENCOUNTER
Hepatic function panel is normal please prescribe Lamisil 250 mg tablets #45 no refills follow-up in 6 weeks

## 2024-08-28 NOTE — TELEPHONE ENCOUNTER
Dr. Casey patient had recent CBC done on 8/12/24. Please advise if ok to send Lamisil. Thank you.

## 2024-08-28 NOTE — TELEPHONE ENCOUNTER
----- Message from Claude Casey sent at 8/13/2024  3:45 PM CDT -----  Results reviewed.  Please inform patient that fungal culture results are positive and we should proceed with Lamisil tablet therapy.  If the patient agrees we have to do a hepatic function panel, please place that order for me with a diagnosis of onychomycosis.

## 2024-08-29 RX ORDER — TERBINAFINE HYDROCHLORIDE 250 MG/1
250 TABLET ORAL DAILY
Qty: 45 TABLET | Refills: 0 | Status: SHIPPED | OUTPATIENT
Start: 2024-08-29 | End: 2024-09-30

## 2024-08-29 NOTE — TELEPHONE ENCOUNTER
Spoke to patient and relayed message as shown below. Patient verbalized understanding. Script sent to pharmacy and f/u appt scheduled.

## 2024-08-30 ENCOUNTER — PATIENT MESSAGE (OUTPATIENT)
Facility: CLINIC | Age: 49
End: 2024-08-30

## 2024-08-30 DIAGNOSIS — R93.7 BONE MARROW EDEMA: ICD-10-CM

## 2024-08-30 DIAGNOSIS — S76.012D MUSCLE STRAIN OF LEFT HIP, SUBSEQUENT ENCOUNTER: ICD-10-CM

## 2024-08-30 DIAGNOSIS — S32.592D PUBIC RAMUS FRACTURE, LEFT, WITH ROUTINE HEALING, SUBSEQUENT ENCOUNTER: ICD-10-CM

## 2024-08-30 DIAGNOSIS — M70.62 TROCHANTERIC BURSITIS OF LEFT HIP: ICD-10-CM

## 2024-08-30 RX ORDER — CELECOXIB 200 MG/1
200 CAPSULE ORAL 2 TIMES DAILY
Qty: 60 CAPSULE | Refills: 0 | Status: SHIPPED | OUTPATIENT
Start: 2024-09-13

## 2024-08-30 NOTE — TELEPHONE ENCOUNTER
Celecoxib 200 mg  DOS: 09/05/24 ([facility])  Last OV: 08/14/24  Last refill date: 08/6/24     #/refills: 60/0  Upcoming appt:   Future Appointments   Date Time Provider Department Center   9/11/2024 10:30 AM Jorge Toussaint DO [facility] [address]   9/30/2024 10:00 AM Pablito Knight MD [facility] [address] [facility] [address]   10/10/2024 10:30 AM Claude Casey DPM [id number]   11/20/2024  8:30 AM Marvin Catalan MD [facility]   5/1/2025 11:00 AM Carlos Toledo MD [id number] 4     Patient doesn't plan to start for another 2 weeks due to surgery with Rus.  Script dated for 2 weeks from today.

## 2024-08-30 NOTE — TELEPHONE ENCOUNTER
From: Arron Gutierrez  To: Jorge BIGGS  Sent: 8/30/2024 7:18 AM CDT  Subject: Celecoxib 200mg    Would you be willing to take over this prescription? I’m hoping to limit my doctors going forward. I’m out but not taking it while recovering from surgery. I’ll start again in two weeks.

## 2024-09-04 ENCOUNTER — OFFICE VISIT (OUTPATIENT)
Facility: CLINIC | Age: 49
End: 2024-09-04
Payer: COMMERCIAL

## 2024-09-04 VITALS — WEIGHT: 191 LBS | HEIGHT: 74 IN | BODY MASS INDEX: 24.51 KG/M2

## 2024-09-04 DIAGNOSIS — M47.816 LUMBAR SPONDYLOSIS: Primary | ICD-10-CM

## 2024-09-04 PROCEDURE — 99214 OFFICE O/P EST MOD 30 MIN: CPT | Performed by: FAMILY MEDICINE

## 2024-09-04 RX ORDER — DIAZEPAM 5 MG
TABLET ORAL
COMMUNITY

## 2024-09-04 RX ORDER — FLURBIPROFEN SODIUM 0.3 MG/ML
SOLUTION/ DROPS OPHTHALMIC
COMMUNITY
Start: 2024-08-30

## 2024-09-04 NOTE — H&P
Sports Medicine Clinic Note    Subjective:    Chief Complaint: Follow-up for chronic low back pain and review of lumbar spine MRI findings.    Interval History: The patient is a 49-year-old male with a known history of lumbar spondylosis, facet arthrosis, and chronic low back pain. He underwent an updated MRI of the lumbar spine to reassess the extent of degenerative changes. The patient reports no significant change in his low back pain since the previous visit, with the pain remaining mostly localized to the lumbar region and radiating occasionally to the left leg. He denies any new onset of numbness, weakness, or bowel/bladder changes. The patient is scheduled for a left hip arthroscopy tomorrow and is hoping it may alleviate some of the lower extremity discomfort.    Objective:    Lumbar Spine Examination:    Inspection:  No visible deformities or abnormal curvature noted.  No erythema or swelling in the lumbar region.    Palpation:  Mild tenderness over the bilateral paraspinal muscles at the L4-5 and L5-S1 levels.  No significant muscle spasm noted.    Range of Motion:  Forward flexion: Limited to 60 degrees with pain.  Extension: Limited to 15 degrees with discomfort.  Lateral bending: Limited to 20 degrees bilaterally with discomfort.    Neurovascular:  Lower extremity sensation intact to light touch in all dermatomes.  Reflexes: 2+ patellar and Achilles reflexes bilaterally.  Strength: 5/5 bilaterally in quadriceps, hamstrings, tibialis anterior, gastrocnemius, and extensor hallucis longus.  Straight leg raise test: Negative bilaterally.    Diagnostic Tests:    MRI Lumbar Spine without Contrast personally reviewed:  L4-5: Grade 1 retrolisthesis, disc degeneration with broad posterior disc bulge, moderate facet arthropathy. Mild to moderate central canal stenosis with bilateral lateral recess narrowing, worse on the left side. Moderate left and minimal right neural foraminal narrowing.  L5-S1: Disc  degeneration with a broad posterior disc bulge, mild to moderate facet arthropathy, no significant central canal stenosis, or neural foraminal narrowing.  Other levels (L1-L4): Minimal disc desiccation with mild facet arthropathy, no significant canal stenosis or foraminal narrowing.    Assessment:    Chronic low back pain secondary to lumbar spondylosis, facet arthropathy, and mild to moderate central canal stenosis at L4-5 with neural foraminal narrowing, worse on the left.  Stable degenerative disc disease at L5-S1 without significant neural involvement.  Pending left hip arthroscopy, with anticipated improvement in lower extremity discomfort related to hip pathology.    Plan:    Additional Imaging/Workup:  No further imaging required at this time. Re-evaluation of symptoms after hip surgery to determine whether further lumbar interventions are needed.    Therapy:  Continue home exercise program focusing on lumbar stabilization, core strengthening, and stretching.  Referral to physical therapy can be reconsidered post-hip surgery depending on symptom progression.    Medications:  Continue Tylenol and NSAIDs as needed for pain management.  Muscle relaxants can be considered if muscle spasms become more prominent.    Bracing/Casting:  No bracing required at this time.    Procedures:  Will hold off on interventional procedures such as lumbar epidural steroid injections or facet blocks until post-hip surgery to assess symptom improvement.    Activity Recommendations:  Continue to avoid heavy lifting, prolonged sitting, or any activities that exacerbate low back pain. Light stretching and walking are encouraged as tolerated. Activity modifications will be reassessed after hip surgery.    Follow-Up:  Follow-up in 4-6 weeks after the hip surgery to reassess the back pain and consider pain management or further interventions if necessary. Return sooner if symptoms worsen or new neurological symptoms  develop.        Jorge Toussaint DO, CAQSM   Primary Care Sports Medicine    Department of Orthopaedic Surgery  [facility]    [address] [place] [address]  [address] [place] [address]    t: [phone]  f: [phone]      [facility]

## 2024-09-07 ENCOUNTER — PATIENT MESSAGE (OUTPATIENT)
Facility: CLINIC | Age: 49
End: 2024-09-07

## 2024-09-09 NOTE — TELEPHONE ENCOUNTER
From: Arron Gutierrez  To: Jorge BIGGS  Sent: 9/7/2024 5:54 AM CDT  Subject: Surgery     Seems like another success Thursday. I started PT yesterday. I still have the left foot pain. Sore to the touch. I’ll set a follow up. It’s been almost 3 weeks since the pain started.

## 2024-09-11 ENCOUNTER — PATIENT MESSAGE (OUTPATIENT)
Dept: FAMILY MEDICINE CLINIC | Facility: CLINIC | Age: 49
End: 2024-09-11

## 2024-09-11 DIAGNOSIS — Z00.00 LABORATORY EXAM ORDERED AS PART OF ROUTINE GENERAL MEDICAL EXAMINATION: Primary | ICD-10-CM

## 2024-09-12 NOTE — TELEPHONE ENCOUNTER
From: Arron Gutierrez  To: Pablito Knight  Sent: 9/11/2024 10:31 AM CDT  Subject: Wellness Screening     I’ll be in next month for my physical and to have my Wellness Screening filled out. Do I need bloodwork?

## 2024-09-16 ENCOUNTER — TELEPHONE (OUTPATIENT)
Dept: ORTHOPEDICS CLINIC | Facility: CLINIC | Age: 49
End: 2024-09-16

## 2024-09-26 LAB
ABSOLUTE BASOPHILS: 57 CELLS/UL (ref 0–200)
ABSOLUTE EOSINOPHILS: 171 CELLS/UL (ref 15–500)
ABSOLUTE LYMPHOCYTES: 1585 CELLS/UL (ref 850–3900)
ABSOLUTE MONOCYTES: 758 CELLS/UL (ref 200–950)
ABSOLUTE NEUTROPHILS: 3129 CELLS/UL (ref 1500–7800)
ALBUMIN/GLOBULIN RATIO: 1.8 (CALC) (ref 1–2.5)
ALBUMIN: 4.4 G/DL (ref 3.6–5.1)
ALKALINE PHOSPHATASE: 61 U/L (ref 36–130)
ALT: 18 U/L (ref 9–46)
APPEARANCE: CLEAR
AST: 18 U/L (ref 10–40)
BASOPHILS: 1 %
BILIRUBIN, TOTAL: 0.3 MG/DL (ref 0.2–1.2)
BILIRUBIN: NEGATIVE
BUN/CREATININE RATIO: 28 (CALC) (ref 6–22)
BUN: 26 MG/DL (ref 7–25)
CALCIUM: 9.5 MG/DL (ref 8.6–10.3)
CARBON DIOXIDE: 27 MMOL/L (ref 20–32)
CHLORIDE: 103 MMOL/L (ref 98–110)
COLOR: YELLOW
CREATININE: 0.94 MG/DL (ref 0.6–1.29)
EGFR: 99 ML/MIN/1.73M2
EOSINOPHILS: 3 %
GLOBULIN: 2.4 G/DL (CALC) (ref 1.9–3.7)
GLUCOSE: 115 MG/DL (ref 65–99)
GLUCOSE: NEGATIVE
HEMATOCRIT: 39.1 % (ref 38.5–50)
HEMOGLOBIN: 13 G/DL (ref 13.2–17.1)
INR: 1
KETONES: NEGATIVE
LEUKOCYTE ESTERASE: NEGATIVE
LYMPHOCYTES: 27.8 %
MCH: 32.8 PG (ref 27–33)
MCHC: 33.2 G/DL (ref 32–36)
MCV: 98.7 FL (ref 80–100)
MONOCYTES: 13.3 %
MPV: 10.3 FL (ref 7.5–12.5)
NEUTROPHILS: 54.9 %
NITRITE: NEGATIVE
OCCULT BLOOD: NEGATIVE
PH: 5.5 (ref 5–8)
PLATELET COUNT: 287 THOUSAND/UL (ref 140–400)
POTASSIUM: 4.2 MMOL/L (ref 3.5–5.3)
PROTEIN, TOTAL: 6.8 G/DL (ref 6.1–8.1)
PROTEIN: NEGATIVE
PT: 10.7 SEC (ref 9–11.5)
RDW: 11.8 % (ref 11–15)
RED BLOOD CELL COUNT: 3.96 MILLION/UL (ref 4.2–5.8)
SODIUM: 140 MMOL/L (ref 135–146)
SPECIFIC GRAVITY: 1.02 (ref 1–1.03)
WHITE BLOOD CELL COUNT: 5.7 THOUSAND/UL (ref 3.8–10.8)

## 2024-09-30 ENCOUNTER — OFFICE VISIT (OUTPATIENT)
Dept: FAMILY MEDICINE CLINIC | Facility: CLINIC | Age: 49
End: 2024-09-30
Payer: COMMERCIAL

## 2024-09-30 VITALS
SYSTOLIC BLOOD PRESSURE: 118 MMHG | RESPIRATION RATE: 16 BRPM | OXYGEN SATURATION: 98 % | WEIGHT: 192 LBS | DIASTOLIC BLOOD PRESSURE: 72 MMHG | HEIGHT: 74 IN | BODY MASS INDEX: 24.64 KG/M2 | TEMPERATURE: 98 F | HEART RATE: 74 BPM

## 2024-09-30 DIAGNOSIS — Z00.00 WELLNESS EXAMINATION: Primary | ICD-10-CM

## 2024-09-30 PROCEDURE — 99396 PREV VISIT EST AGE 40-64: CPT | Performed by: FAMILY MEDICINE
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
## 2024-09-30 NOTE — PATIENT INSTRUCTIONS
Thank you for choosing Pablito Knight MD at [facility]  To Do: Arron Jared Gutierrez  1. Please see age appropriate health prevention below     Call [phone] to schedule the appointment.   Please signup for [employer], which is electronic access to your record if you have not done so.  All your results will post on there.  [email or web address]   You can NOW use [employer] to book your appointments with us, or consider using open access scheduling which is through the [place] website [email or web address] and type in Pablito Knight MD and follow the links for \"Schedule Online Now\"    To schedule Imaging or tests at [place] call Central Scheduling [phone], Go to [facility] ER Building (For example: CT scans, X rays, Ultrasound, MRI)  Cardiac Testing in ER building Building A second floor Cardiac Testing [phone] (For example: Holter Monitor, Cardiac Stress tests,Event Monitor, or 2D Echocardiograms)  [facility] call [phone] usually in [facility]  Walk in Clinic in [place] at [address] Mon-Fri at 8am-7:30 p.m., and Sat/Sun 9:00a.m.-4:30 p.m.  Also at [address] [place] [address]  Call [phone] for info     Please call our office about any questions regarding your treatment/medicines/tests as a result of today's visit.  For your safety, read the entire package insert of all medicines prescribed to you and be aware of all of the risks of treatment even beyond those discussed today.  All therapies have potential risk of harm or side effects or medication interactions.  It is your duty and for your safety to discuss with the pharmacist and our office with questions, and to notify us and stop treatment if problems arise, but know that our intention is that the benefits outweigh those potential risks and we strive to make you healthier and to improve your quality of life.    Referrals, and Radiology Information:    If your insurance requires a referral to a specialist,  please allow 5 business days to process your referral request.    If Pablito Knight MD orders a CT or MRI, it may take up to 10 business days to receive approval from your insurance company. Once our office has called informing you that the insurance company approved your testing, please call Central Scheduling at [phone]  Please allow our office 5 business days to contact you regarding any testing results.    Refill policies:   Allow 3 business days for refills; controlled substances may take longer and must be picked up from the office in person.  Narcotic medications can only be filled in 30 day increments and must be refilled at an office visit only.  If your prescription is due for a refill, you may be due for a follow-up appointment.  We cannot refill your maintenance medications at a preventative wellness visit.  To best provide you care, patients receiving maintenance medications need to be seen at least twice a year.

## 2024-10-01 ENCOUNTER — MED REC SCAN ONLY (OUTPATIENT)
Dept: FAMILY MEDICINE CLINIC | Facility: CLINIC | Age: 49
End: 2024-10-01

## 2024-10-02 ENCOUNTER — PATIENT MESSAGE (OUTPATIENT)
Dept: FAMILY MEDICINE CLINIC | Facility: CLINIC | Age: 49
End: 2024-10-02

## 2024-10-02 ENCOUNTER — OFFICE VISIT (OUTPATIENT)
Facility: CLINIC | Age: 49
End: 2024-10-02
Payer: COMMERCIAL

## 2024-10-02 VITALS — BODY MASS INDEX: 24.64 KG/M2 | HEIGHT: 74 IN | WEIGHT: 192 LBS

## 2024-10-02 DIAGNOSIS — M75.22 BICEPS TENDINITIS OF LEFT UPPER EXTREMITY: ICD-10-CM

## 2024-10-02 DIAGNOSIS — M47.816 LUMBAR SPONDYLOSIS: ICD-10-CM

## 2024-10-02 DIAGNOSIS — M75.21 BICEPS TENDINITIS OF RIGHT UPPER EXTREMITY: Primary | ICD-10-CM

## 2024-10-02 RX ORDER — TRAZODONE HYDROCHLORIDE 50 MG/1
50 TABLET, FILM COATED ORAL NIGHTLY
Qty: 30 TABLET | Refills: 0 | Status: SHIPPED | OUTPATIENT
Start: 2024-10-02

## 2024-10-02 NOTE — TELEPHONE ENCOUNTER
Please start on trazodone 50 mg at night.  Please follow-up with me after 4 weeks and may do a video visit with me.

## 2024-10-02 NOTE — PROGRESS NOTES
Sports Medicine Clinic Note    Subjective:    Chief Complaint: Follow-up for chronic low back pain, left hip post-surgery status, and bilateral shoulder pain.    Interval History: The patient is a 49-year-old male returning for follow-up regarding chronic low back pain, left hip post-surgery status, and increasing bilateral shoulder pain. He reports that his left hip pain has improved since undergoing surgery at [facility] and is currently recovering well. He would also like to revisit his lumbar MRI and potential for interventional spine procedures which were previously discussed. His primary concern today is worsening anterior shoulder pain, particularly in the bicipital groove region of both shoulders, consistent with long head biceps tendinopathy suggested by his physical therapist. He has been engaging in physical therapy, which has provided some benefit. He reports previous shoulder injections were highly effective but notes that his current pain has been more persistent anteriorly in both shoulders, especially the bicipital tendon area. He is interested in proceeding with targeted injections for this issue.    Objective:    Bilateral Shoulder Examination:    Inspection: No visible swelling or deformities. No skin changes or erythema noted.  Palpation: Tenderness localized to the bicipital groove bilaterally. No tenderness over the acromioclavicular joint, subacromial space, or glenohumeral joint.  Range of Motion: Flexion: 0-180 degrees with discomfort at end range. Abduction: 0-90 degrees with mild pain. Internal Rotation: 0-70 degrees with discomfort. External Rotation: 0-90 degrees with mild discomfort.  Neurovascular: Grossly intact sensation to light touch in both upper extremities. Radial and ulnar pulses are 2+ bilaterally.  Special Tests: Speed’s Test: Positive bilaterally, reproducing anterior shoulder pain. Yergason’s Test: Positive bilaterally. Empty Can Test: Negative. O’Gerber’s Test:  Negative.    Diagnostic Tests:    No new testing. MRI Lumbar Spine previously reviewed and discussed, showing lumbar spondylosis with moderate facet arthropathy, mild to moderate central canal stenosis at L4-5 with left-sided neural foraminal narrowing, and degenerative disc disease at L5-S1 without significant neural impingement.    Assessment:    Bilateral long head of biceps tendinopathy.  Chronic low back pain secondary to lumbar spondylosis and facet arthropathy, with mild to moderate central canal stenosis at L4-5.  Left hip pain status post-surgical intervention, currently improving.    Plan:    Additional Imaging/Workup: No further imaging is required at this time for the shoulders.  Therapy: Continue physical therapy with an emphasis on shoulder stabilization, rotator cuff strengthening, and gentle stretching exercises for both shoulders.  Continue home exercise program for lumbar stabilization and core strengthening exercises.  Medications: Continue Tylenol and NSAIDs as needed for pain management.  Procedures: Schedule bilateral ultrasound-guided injections into the bicipital tendon sheaths for targeted pain relief, to be performed in one week. Consider pain management referral in the future this is deferred for time being.  Activity Recommendations: Advise the patient to limit overhead lifting and repetitive shoulder movements until after the injections. Encourage gentle range-of-motion exercises as tolerated, avoiding any aggravating activities. Continue avoiding heavy lifting or activities that may exacerbate lumbar pain.    Follow-Up: Tentatively scheduled follow-up for shoulder procedures, plan to initiate pain management referral at that time as well.      Jorge Toussaint DO, [facility]   Primary Care Sports Medicine    Department of Orthopaedic Surgery  [facility]    [address] [place] [address]  [address] [place] [address]    t: [phone]  f:  [phone]      [email or web address]

## 2024-10-02 NOTE — TELEPHONE ENCOUNTER
Rx pended    High Drug to Drug interaction    Routed to provider for review    High  Drug-Drug: traZODone and DULoxetine  Serotonergic effects of trazodone and serotonin/norepinephrine reuptake inhibitors (SNRIs) may be additive. The risk of serotonin syndrome/toxicity may be increased.

## 2024-10-02 NOTE — TELEPHONE ENCOUNTER
From: Arron Gutierrez  To: Pablito Knight  Sent: 10/2/2024 1:47 AM CDT  Subject: Sleeping problem     I cannot sleep through the night. Is there anything you can prescribe? I messaged before my physical but a nurse said you wonky address in person.

## 2024-10-07 DIAGNOSIS — R93.7 BONE MARROW EDEMA: ICD-10-CM

## 2024-10-07 DIAGNOSIS — M70.62 TROCHANTERIC BURSITIS OF LEFT HIP: ICD-10-CM

## 2024-10-07 DIAGNOSIS — S32.592D PUBIC RAMUS FRACTURE, LEFT, WITH ROUTINE HEALING, SUBSEQUENT ENCOUNTER: ICD-10-CM

## 2024-10-07 DIAGNOSIS — S76.012D MUSCLE STRAIN OF LEFT HIP, SUBSEQUENT ENCOUNTER: ICD-10-CM

## 2024-10-07 RX ORDER — CELECOXIB 200 MG/1
200 CAPSULE ORAL 2 TIMES DAILY
Qty: 60 CAPSULE | Refills: 0 | Status: SHIPPED | OUTPATIENT
Start: 2024-10-07

## 2024-10-07 NOTE — TELEPHONE ENCOUNTER
Celecoxib    DOS: n/a  Last OV: 10/2/24   Right bicep tendinitis  Last refill date: 9/7/24 #/refills: 60/0  Upcoming appt:   Future Appointments   Date Time Provider Department Center   10/9/2024 11:30 AM Jorge Toussaint DO [facility] [address]   10/10/2024 10:30 AM Claude Casey, [id number]     Component      Latest Ref Rng 9/25/2024   Glucose      65 - 99 mg/dL 115 (H)    BUN      7 - 25 mg/dL 26 (H)    CREATININE      0.60 - 1.29 mg/dL 0.94    EGFR      > OR = 60 mL/min/1.73m2 99    BUN/CREATININE RATIO      6 - 22 (calc) 28 (H)    Sodium      135 - 146 mmol/L 140    Potassium      3.5 - 5.3 mmol/L 4.2    Chloride      98 - 110 mmol/L 103    Carbon Dioxide, Total      20 - 32 mmol/L 27    CALCIUM      8.6 - 10.3 mg/dL 9.5    PROTEIN, TOTAL      6.1 - 8.1 g/dL 6.8    Albumin      3.6 - 5.1 g/dL 4.4    Globulin      1.9 - 3.7 g/dL (calc) 2.4    A/G Ratio      1.0 - 2.5 (calc) 1.8    Total Bilirubin      0.2 - 1.2 mg/dL 0.3    Alkaline Phosphatase      36 - 130 U/L 61    AST (SGOT)      10 - 40 U/L 18    ALT (SGPT)      9 - 46 U/L 18       Legend:  (H) High

## 2024-10-08 ENCOUNTER — TELEPHONE (OUTPATIENT)
Dept: FAMILY MEDICINE CLINIC | Facility: CLINIC | Age: 49
End: 2024-10-08

## 2024-10-08 DIAGNOSIS — Z00.00 LABORATORY EXAM ORDERED AS PART OF ROUTINE GENERAL MEDICAL EXAMINATION: ICD-10-CM

## 2024-10-08 DIAGNOSIS — E78.5 HYPERLIPIDEMIA, UNSPECIFIED HYPERLIPIDEMIA TYPE: Primary | ICD-10-CM

## 2024-10-08 NOTE — TELEPHONE ENCOUNTER
Patients labs that were ordered on 9/12 were ordered incorrectly. They need to be ordered to [facility] not [facility].    Labs re-ordered and PSR will contact patient to let him know.    Form is in my patient ppw folder on my desk

## 2024-10-09 ENCOUNTER — OFFICE VISIT (OUTPATIENT)
Facility: CLINIC | Age: 49
End: 2024-10-09
Payer: COMMERCIAL

## 2024-10-09 ENCOUNTER — PATIENT MESSAGE (OUTPATIENT)
Dept: FAMILY MEDICINE CLINIC | Facility: CLINIC | Age: 49
End: 2024-10-09

## 2024-10-09 ENCOUNTER — LAB ENCOUNTER (OUTPATIENT)
Dept: LAB | Age: 49
End: 2024-10-09
Attending: FAMILY MEDICINE
Payer: COMMERCIAL

## 2024-10-09 VITALS — HEIGHT: 74 IN | BODY MASS INDEX: 24.64 KG/M2 | WEIGHT: 192 LBS

## 2024-10-09 DIAGNOSIS — M75.21 BICEPS TENDINITIS OF RIGHT UPPER EXTREMITY: ICD-10-CM

## 2024-10-09 DIAGNOSIS — M75.22 BICEPS TENDINITIS OF LEFT UPPER EXTREMITY: ICD-10-CM

## 2024-10-09 DIAGNOSIS — M47.816 LUMBAR SPONDYLOSIS: Primary | ICD-10-CM

## 2024-10-09 LAB
CHOLEST SERPL-MCNC: 192 MG/DL (ref ?–200)
EST. AVERAGE GLUCOSE BLD GHB EST-MCNC: 117 MG/DL (ref 68–126)
FASTING PATIENT LIPID ANSWER: YES
HBA1C MFR BLD: 5.7 % (ref ?–5.7)
HDLC SERPL-MCNC: 70 MG/DL (ref 40–59)
LDLC SERPL CALC-MCNC: 110 MG/DL (ref ?–100)
NONHDLC SERPL-MCNC: 122 MG/DL (ref ?–130)
T3FREE SERPL-MCNC: 2.65 PG/ML (ref 2.4–4.2)
T4 FREE SERPL-MCNC: 1.4 NG/DL (ref 0.8–1.7)
TRIGL SERPL-MCNC: 64 MG/DL (ref 30–149)
TSI SER-ACNC: 0.55 MIU/ML (ref 0.55–4.78)
VLDLC SERPL CALC-MCNC: 11 MG/DL (ref 0–30)

## 2024-10-09 PROCEDURE — 36415 COLL VENOUS BLD VENIPUNCTURE: CPT | Performed by: FAMILY MEDICINE

## 2024-10-09 PROCEDURE — 83036 HEMOGLOBIN GLYCOSYLATED A1C: CPT | Performed by: FAMILY MEDICINE

## 2024-10-09 PROCEDURE — 84439 ASSAY OF FREE THYROXINE: CPT | Performed by: FAMILY MEDICINE

## 2024-10-09 PROCEDURE — 76942 ECHO GUIDE FOR BIOPSY: CPT | Performed by: FAMILY MEDICINE

## 2024-10-09 PROCEDURE — 80061 LIPID PANEL: CPT | Performed by: FAMILY MEDICINE

## 2024-10-09 PROCEDURE — 84443 ASSAY THYROID STIM HORMONE: CPT | Performed by: FAMILY MEDICINE

## 2024-10-09 PROCEDURE — 84481 FREE ASSAY (FT-3): CPT | Performed by: FAMILY MEDICINE

## 2024-10-09 PROCEDURE — 99214 OFFICE O/P EST MOD 30 MIN: CPT | Performed by: FAMILY MEDICINE

## 2024-10-09 PROCEDURE — 20550 NJX 1 TENDON SHEATH/LIGAMENT: CPT | Performed by: FAMILY MEDICINE

## 2024-10-09 RX ORDER — BETAMETHASONE SODIUM PHOSPHATE AND BETAMETHASONE ACETATE 3; 3 MG/ML; MG/ML
6 INJECTION, SUSPENSION INTRA-ARTICULAR; INTRALESIONAL; INTRAMUSCULAR; SOFT TISSUE ONCE
Status: COMPLETED | OUTPATIENT
Start: 2024-10-09 | End: 2024-10-09

## 2024-10-09 RX ORDER — KETOROLAC TROMETHAMINE 30 MG/ML
30 INJECTION, SOLUTION INTRAMUSCULAR; INTRAVENOUS ONCE
Status: COMPLETED | OUTPATIENT
Start: 2024-10-09 | End: 2024-10-09

## 2024-10-09 RX ADMIN — BETAMETHASONE SODIUM PHOSPHATE AND BETAMETHASONE ACETATE 6 MG: 3; 3 INJECTION, SUSPENSION INTRA-ARTICULAR; INTRALESIONAL; INTRAMUSCULAR; SOFT TISSUE at 11:19:00

## 2024-10-09 RX ADMIN — KETOROLAC TROMETHAMINE 30 MG: 30 INJECTION, SOLUTION INTRAMUSCULAR; INTRAVENOUS at 11:19:00

## 2024-10-09 NOTE — PROGRESS NOTES
Sports Medicine Clinic Note    Subjective:    Chief Complaint: Follow-up for chronic low back pain, left hip post-surgery status, and bilateral shoulder pain with worsening symptoms.    Interval History: The patient is a 49-year-old male returning for follow-up regarding chronic low back pain, left hip post-surgery status, and persistent bilateral shoulder pain. He reports slight worsening of the anterior shoulder pain, especially in the bicipital groove region bilaterally. The patient notes that while physical therapy has helped maintain some shoulder function, the pain has become more bothersome in recent weeks, particularly during overhead activities. He describes occasional night pain and difficulty with daily activities due to the discomfort. His left hip continues to recover well following surgery, with no new concerns regarding that area. Low back pain seems to be worsening and he requests pain management referral today. The patient is eager to proceed with further pain management strategies for his shoulders and lumbar spine. He also expresses interest in receiving injections for the bilateral biceps tendons, as prior injections provided temporary relief.    Objective:    Bilateral Shoulder Examination:    Inspection: No visible swelling or deformities. No skin changes or erythema noted.   Palpation: Tenderness continues to be localized over the bicipital groove bilaterally. No tenderness noted over the acromioclavicular joint, subacromial space, or glenohumeral joint.   Range of Motion: Flexion 0-170 degrees with increased discomfort at end range. Abduction 0-90 degrees with mild to moderate pain. Internal rotation 0-65 degrees with discomfort. External rotation 0-85 degrees with discomfort.   Neurovascular: Grossly intact sensation to light touch in both upper extremities. Radial and ulnar pulses are 2+ bilaterally.   Special Tests: Speed’s Test: Positive bilaterally with worsening anterior shoulder pain.  Yergason’s Test: Positive bilaterally. Empty Can Test: Negative. O’Gerber’s Test: Negative.    Lumbar Spine Examination:    Inspection: No abnormal curvature or deformities noted.   Palpation: Tenderness to palpation over the bilateral lumbar paraspinal muscles and lower lumbar spinous processes.   Range of Motion: Flexion, extension, lateral bending, and rotation are limited by stiffness and discomfort.   Neurovascular: Sensation intact to light touch throughout bilateral lower extremities.   Motor strength 5/5 in all major muscle groups of the lower extremities.   Special Tests: Straight Leg Raise Test: Negative bilaterally.    Diagnostic Tests:    No new imaging. Prior MRI Lumbar Spine shows lumbar spondylosis with moderate facet arthropathy, mild to moderate central canal stenosis at L4-5 with left-sided neural foraminal narrowing, and degenerative disc disease at L5-S1 without significant neural impingement.    Assessment:    Bilateral long head of biceps tendinopathy with worsening pain.  Chronic low back pain secondary to lumbar spondylosis and facet arthropathy, stable with mild to moderate central canal stenosis at L4-5.  Left hip pain status post-surgical intervention, improving.    Plan:    Imaging/Workup: No new imaging is needed for the shoulders at this time.  Therapy: Continue physical therapy with an emphasis on shoulder stabilization and rotator cuff strengthening. Continue home exercises for lumbar stabilization.  Medications: Continue Tylenol and NSAIDs for pain management as needed.  Procedures: Bilateral ultrasound-guided injections into the bicipital tendon sheaths were performed today for targeted pain relief. The patient tolerated the procedures well. Referral to pain management is initiated for evaluation of the chronic low back pain, including consideration of interventional options such as epidural steroid injections.  Activity Recommendations: Continue to limit overhead lifting and  repetitive shoulder movements. Gentle range of motion and shoulder exercises are encouraged. Avoid any activities that may aggravate lumbar or shoulder pain.    Follow-Up: Tentatively scheduled follow-up in four weeks to assess shoulder pain and response to injections. Further follow-up with pain management pending initial consultation. Return sooner if symptoms worsen or if there are new concerns.    Ultrasound Guided Procedure Note:    After discussion of the risks and benefits, the patient elected to proceed with a therapeutic injection into the bilateral long head bicep tenosynovial sheath within the bicipital groove under US guidance. Confirmed that the patient does not have history of prior adverse reactions, active infections, or relevant allergies. There was no erythema or warmth, and the skin was clear.    For each side:    The skin was sterilized with ChloraPrep. A 22 gauge needle was inserted via lateral approach utilizing US for needle guidance and placement. The site was injected with a mixture of 1 mL of betamethasone 6 mg/mL, 1 mL of ketorolac 30 mg/mL and 0.5 mL of 1% Lidocaine without Epinephrine. The injection was completed without complication, and a bandage was applied.    The patient tolerated the procedure well and was instructed to avoid strenuous activity for the next 24-48 hours and to use ice or Tylenol for pain as needed. The patient will call immediately with any signs of infection or allergic reaction.    Post-Injection Care: The patient tolerated the procedure well. An occlusive bandage was placed over the injection site. Post-injection care instructions provided to the patient. The patient was asked to avoid strenuous activity and continue to rest the area for 2-3 days before resuming regular activities. Patient advised that the area may be more painful for the first 1-2 days. They can use ice or Tylenol for pain as needed.  Patient was instructed to watch for fever, increased  swelling, or persistent pain. The patient will call immediately with any signs of infection or allergic reaction.    Complications: The patient tolerated the procedure well without any complications.      Jorge Toussaint DO, LEEM   Primary Care Sports Medicine    Department of Orthopaedic Surgery  [facility]    [address] [place] [id number]  [address] [place] [id number]    t: [phone]  f: [phone]      [facility]

## 2024-10-10 ENCOUNTER — OFFICE VISIT (OUTPATIENT)
Dept: PODIATRY CLINIC | Facility: CLINIC | Age: 49
End: 2024-10-10
Payer: COMMERCIAL

## 2024-10-10 DIAGNOSIS — B35.1 ONYCHOMYCOSIS: Primary | ICD-10-CM

## 2024-10-10 PROCEDURE — 99213 OFFICE O/P EST LOW 20 MIN: CPT | Performed by: PODIATRIST
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
## 2024-10-10 NOTE — TELEPHONE ENCOUNTER
Form completed and signed.    Pt notified via [employer].    Original form placed at the  for patient.    Copy made and sent to scan.

## 2024-10-11 ENCOUNTER — LAB ENCOUNTER (OUTPATIENT)
Dept: LAB | Age: 49
End: 2024-10-11
Attending: PODIATRIST
Payer: COMMERCIAL

## 2024-10-11 LAB
ALBUMIN SERPL-MCNC: 4.6 G/DL (ref 3.2–4.8)
ALP LIVER SERPL-CCNC: 69 U/L
ALT SERPL-CCNC: 39 U/L
AST SERPL-CCNC: 52 U/L (ref ?–34)
BILIRUB DIRECT SERPL-MCNC: 0.2 MG/DL (ref ?–0.3)
BILIRUB SERPL-MCNC: 0.5 MG/DL (ref 0.3–1.2)
PROT SERPL-MCNC: 7.2 G/DL (ref 5.7–8.2)

## 2024-10-11 PROCEDURE — 80076 HEPATIC FUNCTION PANEL: CPT | Performed by: PODIATRIST

## 2024-10-11 PROCEDURE — 36415 COLL VENOUS BLD VENIPUNCTURE: CPT | Performed by: PODIATRIST

## 2024-10-16 ENCOUNTER — TELEMEDICINE (OUTPATIENT)
Dept: FAMILY MEDICINE CLINIC | Facility: CLINIC | Age: 49
End: 2024-10-16
Payer: COMMERCIAL

## 2024-10-16 DIAGNOSIS — G47.9 SLEEP DISTURBANCE: Primary | ICD-10-CM

## 2024-10-16 PROCEDURE — 99213 OFFICE O/P EST LOW 20 MIN: CPT | Performed by: FAMILY MEDICINE

## 2024-10-16 NOTE — PROGRESS NOTES
Subjective:   Patient ID: Arron Gutierrez is a 49 year old male.    HPI  Mr. 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  He did send a message previously.  He talked to some of his family members and recommended for him to try trazodone which I sent a few days ago.  Apparently he has been somewhat stressed and has been using his laptop for work.  He is not exactly sure what may have been triggering his stress. I had reviewed past medical and family histories together with allergy and medication lists documented.    History/Other:   Review of Systems   Psychiatric/Behavioral:  Positive for sleep disturbance. Negative for behavioral problems. The patient is not nervous/anxious.      Current Outpatient Medications   Medication Sig Dispense Refill    CELECOXIB 200 MG Oral Cap TAKE 1 CAPSULE BY MOUTH TWICE A DAY 60 capsule 0    traZODone 50 MG Oral Tab Take 1 tablet (50 mg total) by mouth nightly. 30 tablet 0    BD PEN NEEDLE MINI U/F 31G X 5 MM Does not apply Misc       indomethacin ER 75 MG Oral Cap CR Take 1 capsule (75 mg total) by mouth.      docusate sodium (COLACE) 100 MG Oral Cap Take 1 capsule (100 mg total) by mouth daily.      famotidine 40 MG Oral Tab Take 1 tablet (40 mg total) by mouth nightly as needed.      DULoxetine 60 MG Oral Cap DR Particles Take 1 capsule (60 mg total) by mouth daily. 90 capsule 2    ketoconazole 2 % External Cream Apply to face once or twice daily as needed 15 g 2    gabapentin 400 MG Oral Cap Take 1 capsule (400 mg total) by mouth 2 (two) times daily. 60 capsule 5    tamsulosin 0.4 MG Oral Cap Take 1 capsule (0.4 mg total) by mouth every evening. 90 capsule 6    Aluminum Chloride (DRYSOL) 20 % External Solution Apply 1 Application topically nightly. 75 mL 0    azelastine 0.1 % Nasal Solution 1 spray by  Nasal route 2 (two) times daily.      levocetirizine 5 MG Oral Tab Take 1 tablet (5 mg total) by mouth nightly as needed.      glycopyrrolate 1 MG Oral Tab TAKE 1 TABLET BY MOUTH 3 TIMES DAILY. (Patient taking differently: Take 1 tablet (1 mg total) by mouth 2 (two) times daily.) 270 tablet 3    cephalexin 500 MG Oral Cap Take 1 capsule (500 mg total) by mouth 3 (three) times daily. 90 capsule 5    Teriparatide, Recombinant, (FORTEO) 600 MCG/2.4ML Subcutaneous Solution Pen-injector Inject 20 mcg into the skin daily.      cyclobenzaprine 5 MG Oral Tab Take 1 tablet (5 mg total) by mouth 3 (three) times daily as needed.      XIIDRA 5 % Ophthalmic Solution       baclofen 10 MG Oral Tab Take 1 tablet (10 mg total) by mouth 3 (three) times daily.       Allergies:Allergies[1]    Objective:   Physical Exam  Constitutional:       General: He is not in acute distress.  Neurological:      Mental Status: He is alert.         Assessment & Plan:   1. Sleep disturbance    -Trial of trazodone 50 mg at night  - Stop medication if this makes him tired the next day or very sleepy  - Do not operate heavy machinery  - Fall precautions  - Follow-up and give us an update how he is doing with this medication      This note was prepared using Dragon Medical voice recognition dictation software. As a result errors may occur. When identified these errors have been corrected. While every attempt is made to correct errors during dictation discrepancies may still exist            No orders of the defined types were placed in this encounter.      Meds This Visit:  Requested Prescriptions      No prescriptions requested or ordered in this encounter       Imaging & Referrals:  None         [1]   Allergies  Allergen Reactions    Carbamazepine FEVER, FACE FLUSHING and ITCHING

## 2024-10-17 ENCOUNTER — PATIENT MESSAGE (OUTPATIENT)
Dept: FAMILY MEDICINE CLINIC | Facility: CLINIC | Age: 49
End: 2024-10-17

## 2024-10-22 ENCOUNTER — PATIENT MESSAGE (OUTPATIENT)
Dept: FAMILY MEDICINE CLINIC | Facility: CLINIC | Age: 49
End: 2024-10-22

## 2024-10-22 DIAGNOSIS — R74.01 ELEVATED AST (SGOT): Primary | ICD-10-CM

## 2024-10-22 NOTE — TELEPHONE ENCOUNTER
Component  Ref Range & Units 10/11/24  7:20 AM   AST  <34 U/L 52 High    ALT  10 - 49 U/L 39          Please advise

## 2024-10-28 ENCOUNTER — TELEPHONE (OUTPATIENT)
Dept: FAMILY MEDICINE CLINIC | Facility: CLINIC | Age: 49
End: 2024-10-28

## 2024-10-28 ENCOUNTER — LAB ENCOUNTER (OUTPATIENT)
Dept: LAB | Age: 49
End: 2024-10-28
Attending: FAMILY MEDICINE
Payer: COMMERCIAL

## 2024-10-28 DIAGNOSIS — R74.01 ELEVATED AST (SGOT): ICD-10-CM

## 2024-10-28 LAB
ALBUMIN SERPL-MCNC: 4.8 G/DL (ref 3.2–4.8)
ALBUMIN/GLOB SERPL: 1.7 {RATIO} (ref 1–2)
ALP LIVER SERPL-CCNC: 64 U/L
ALT SERPL-CCNC: 29 U/L
ANION GAP SERPL CALC-SCNC: 5 MMOL/L (ref 0–18)
AST SERPL-CCNC: 27 U/L (ref ?–34)
BILIRUB SERPL-MCNC: 0.7 MG/DL (ref 0.3–1.2)
BUN BLD-MCNC: 26 MG/DL (ref 9–23)
CALCIUM BLD-MCNC: 10.1 MG/DL (ref 8.7–10.4)
CHLORIDE SERPL-SCNC: 104 MMOL/L (ref 98–112)
CO2 SERPL-SCNC: 29 MMOL/L (ref 21–32)
CREAT BLD-MCNC: 0.99 MG/DL
EGFRCR SERPLBLD CKD-EPI 2021: 93 ML/MIN/1.73M2 (ref 60–?)
FASTING STATUS PATIENT QL REPORTED: YES
GLOBULIN PLAS-MCNC: 2.8 G/DL (ref 2–3.5)
GLUCOSE BLD-MCNC: 108 MG/DL (ref 70–99)
OSMOLALITY SERPL CALC.SUM OF ELEC: 291 MOSM/KG (ref 275–295)
POTASSIUM SERPL-SCNC: 4 MMOL/L (ref 3.5–5.1)
PROT SERPL-MCNC: 7.6 G/DL (ref 5.7–8.2)
SODIUM SERPL-SCNC: 138 MMOL/L (ref 136–145)

## 2024-10-28 PROCEDURE — 36415 COLL VENOUS BLD VENIPUNCTURE: CPT

## 2024-10-28 PROCEDURE — 80053 COMPREHEN METABOLIC PANEL: CPT

## 2024-10-28 NOTE — TELEPHONE ENCOUNTER
Future Appointments   Date Time Provider Department Center   10/29/2024  9:30 AM Pablito Knight MD [facility] [address] [facility] [address]   11/20/2024  8:30 AM Marvin Catalan MD [facility]   3/31/2025 10:00 AM Pablito Knight MD [facility] [address] [facility] [address]   5/1/2025 11:00 AM Carlos Toledo MD [id number]     Patient had labs done today, patient states he was to repeat a liver panel. Patient had hepatic panel done on 10-11, ordered by Dr Casey. No order on file

## 2024-10-29 ENCOUNTER — OFFICE VISIT (OUTPATIENT)
Dept: FAMILY MEDICINE CLINIC | Facility: CLINIC | Age: 49
End: 2024-10-29
Payer: COMMERCIAL

## 2024-10-29 ENCOUNTER — TELEPHONE (OUTPATIENT)
Dept: FAMILY MEDICINE CLINIC | Facility: CLINIC | Age: 49
End: 2024-10-29

## 2024-10-29 VITALS
DIASTOLIC BLOOD PRESSURE: 76 MMHG | OXYGEN SATURATION: 98 % | RESPIRATION RATE: 16 BRPM | HEIGHT: 74 IN | SYSTOLIC BLOOD PRESSURE: 126 MMHG | HEART RATE: 70 BPM | BODY MASS INDEX: 24.26 KG/M2 | TEMPERATURE: 98 F | WEIGHT: 189 LBS

## 2024-10-29 DIAGNOSIS — G47.9 SLEEP DISTURBANCE: ICD-10-CM

## 2024-10-29 DIAGNOSIS — B35.1 ONYCHOMYCOSIS: ICD-10-CM

## 2024-10-29 DIAGNOSIS — R74.01 ELEVATED AST (SGOT): Primary | ICD-10-CM

## 2024-10-29 PROCEDURE — 99215 OFFICE O/P EST HI 40 MIN: CPT | Performed by: FAMILY MEDICINE

## 2024-10-29 RX ORDER — TRAZODONE HYDROCHLORIDE 50 MG/1
50 TABLET, FILM COATED ORAL NIGHTLY
Qty: 90 TABLET | Refills: 0 | Status: SHIPPED | OUTPATIENT
Start: 2024-10-29

## 2024-10-29 RX ORDER — TERBINAFINE HYDROCHLORIDE 250 MG/1
250 TABLET ORAL DAILY
COMMUNITY

## 2024-10-29 NOTE — TELEPHONE ENCOUNTER
Future Appointments   Date Time Provider Department Center   10/29/2024  9:30 AM Pablito Knight MD [facility] [address] [facility] [address]   11/20/2024  8:30 AM Marvin Catalan MD [facility]   3/31/2025 10:00 AM Pablito Knight MD [facility] [address] [facility] [address]   5/1/2025 11:00 AM Carlos Toledo MD [id number]

## 2024-10-29 NOTE — PROGRESS NOTES
Subjective:   Patient ID: Arron Gutierrez is a 49 year old male.    HPI  Mr. 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  Liver enzymes were checked which showed slight elevation of AST.  This was rechecked again after discontinuance of terbinafine which showed normal levels.    He also reports that trazodone which was recently started for him has been helpful.  He has been getting restful sleep with it.  No side effects from taking it.  He is requesting for a 90-day supply for it.    I had reviewed past medical and family histories together with allergy and medication lists documented.        History/Other:   Review of Systems  Constitutional:  Negative for fatigue, fever and unexpected weight change.   HENT:  Negative for sore throat and trouble swallowing.    Respiratory:  Negative for cough and shortness of breath.    Cardiovascular:  Negative for chest pain, palpitations and leg swelling.   Gastrointestinal:  Negative for abdominal pain, constipation, diarrhea, nausea and vomiting.   Genitourinary:  Negative for difficulty urinating, dysuria and hematuria.   Musculoskeletal:  Positive for arthralgias, back pain and myalgias.   Neurological:  Positive for weakness and numbness. Negative for dizziness.   Psychiatric/Behavioral:  Negative for agitation.    Current Outpatient Medications   Medication Sig Dispense Refill    traZODone 50 MG Oral Tab Take 1 tablet (50 mg total) by mouth nightly. 90 tablet 0    terbinafine 250 MG Oral Tab Take 1 tablet (250 mg total) by mouth daily.      CELECOXIB 200 MG Oral Cap TAKE 1 CAPSULE BY MOUTH TWICE A DAY 60 capsule 0    BD PEN NEEDLE MINI U/F 31G X 5 MM Does not apply Misc       indomethacin ER 75 MG Oral Cap CR Take 1 capsule (75  mg total) by mouth.      docusate sodium (COLACE) 100 MG Oral Cap Take 1 capsule (100 mg total) by mouth daily.      famotidine 40 MG Oral Tab Take 1 tablet (40 mg total) by mouth nightly as needed.      DULoxetine 60 MG Oral Cap DR Particles Take 1 capsule (60 mg total) by mouth daily. 90 capsule 2    ketoconazole 2 % External Cream Apply to face once or twice daily as needed 15 g 2    gabapentin 400 MG Oral Cap Take 1 capsule (400 mg total) by mouth 2 (two) times daily. 60 capsule 5    tamsulosin 0.4 MG Oral Cap Take 1 capsule (0.4 mg total) by mouth every evening. 90 capsule 6    Aluminum Chloride (DRYSOL) 20 % External Solution Apply 1 Application topically nightly. 75 mL 0    azelastine 0.1 % Nasal Solution 1 spray by Nasal route 2 (two) times daily.      levocetirizine 5 MG Oral Tab Take 1 tablet (5 mg total) by mouth nightly as needed.      glycopyrrolate 1 MG Oral Tab TAKE 1 TABLET BY MOUTH 3 TIMES DAILY. (Patient taking differently: Take 1 tablet (1 mg total) by mouth 2 (two) times daily.) 270 tablet 3    cephalexin 500 MG Oral Cap Take 1 capsule (500 mg total) by mouth 3 (three) times daily. 90 capsule 5    Teriparatide, Recombinant, (FORTEO) 600 MCG/2.4ML Subcutaneous Solution Pen-injector Inject 20 mcg into the skin daily.      cyclobenzaprine 5 MG Oral Tab Take 1 tablet (5 mg total) by mouth 3 (three) times daily as needed.      XIIDRA 5 % Ophthalmic Solution       baclofen 10 MG Oral Tab Take 1 tablet (10 mg total) by mouth 3 (three) times daily.       Allergies:Allergies[1]    Objective:   Physical Exam  Constitutional:       General: He is not in acute distress.  Musculoskeletal:        Feet:    Feet:      Right foot:      Toenail Condition: Right toenails are abnormally thick. Fungal disease present.     Left foot:      Toenail Condition: Left toenails are abnormally thick. Fungal disease present.  Neurological:      Mental Status: He is alert.         Assessment & Plan:   1. Elevated AST (SGOT)    -Resolved  - Possible effect from recent use of terbinafine   2. Onychomycosis   -Recommend not using terbinafine at this point  - I did ask him to discuss with his podiatrist regarding possible use of topical antifungal rather than systemic such as Jublia  - He may try over-the-counter tea tree oil   3. Sleep disturbance   -Doing well with trazodone 50 mg at night  - Continue at this point and we will send to the pharmacy     Follow-up as scheduled or as needed      This note was prepared using Dragon Medical voice recognition dictation software. As a result errors may occur. When identified these errors have been corrected. While every attempt is made to correct errors during dictation discrepancies may still exist          No orders of the defined types were placed in this encounter.      Meds This Visit:  Requested Prescriptions     Signed Prescriptions Disp Refills    traZODone 50 MG Oral Tab 90 tablet 0     Sig: Take 1 tablet (50 mg total) by mouth nightly.       Imaging & Referrals:  None         [1]   Allergies  Allergen Reactions    Carbamazepine FEVER, FACE FLUSHING and ITCHING

## 2024-10-29 NOTE — TELEPHONE ENCOUNTER
Received incoming fax requesting quantity change to 90.0.    traZODone 50 MG Oral Tab 30 tablet 0 10/2/2024 --    Sig - Route: Take 1 tablet (50 mg total) by mouth nightly. - Oral    Sent to pharmacy as: traZODone HCl 50 MG Oral Tablet (Desyrel)      Placed fax in MA folder.

## 2024-10-29 NOTE — PATIENT INSTRUCTIONS
Thank you for choosing Pablito Knight MD at [facility]  To Do: Arron Jared Gutierrez  1. Please see below   Call [phone] to schedule the appointment.   Please signup for MyColorScreen, which is electronic access to your record if you have not done so.  All your results will post on there.  [email or web address]   You can NOW use MyColorScreen to book your appointments with us, or consider using open access scheduling which is through the [place] website [email or web address] and type in Pablito Knight MD and follow the links for \"Schedule Online Now\"    To schedule Imaging or tests at [place] call Central Scheduling [phone], Go to [facility] ER Building (For example: CT scans, X rays, Ultrasound, MRI)  Cardiac Testing in ER building Building A second floor Cardiac Testing [phone] (For example: Holter Monitor, Cardiac Stress tests,Event Monitor, or 2D Echocardiograms)  [facility] call [phone] usually in [facility]  Walk in Clinic in [place] at [address] Mon-Fri at 8am-7:30 p.m., and Sat/Sun 9:00a.m.-4:30 p.m.  Also at [address] [place] [address]  Call [phone] for info     Please call our office about any questions regarding your treatment/medicines/tests as a result of today's visit.  For your safety, read the entire package insert of all medicines prescribed to you and be aware of all of the risks of treatment even beyond those discussed today.  All therapies have potential risk of harm or side effects or medication interactions.  It is your duty and for your safety to discuss with the pharmacist and our office with questions, and to notify us and stop treatment if problems arise, but know that our intention is that the benefits outweigh those potential risks and we strive to make you healthier and to improve your quality of life.    Referrals, and Radiology Information:    If your insurance requires a referral to a specialist, please allow 5 business days to  process your referral request.    If Pablito Knight MD orders a CT or MRI, it may take up to 10 business days to receive approval from your insurance company. Once our office has called informing you that the insurance company approved your testing, please call Central Scheduling at [phone]  Please allow our office 5 business days to contact you regarding any testing results.    Refill policies:   Allow 3 business days for refills; controlled substances may take longer and must be picked up from the office in person.  Narcotic medications can only be filled in 30 day increments and must be refilled at an office visit only.  If your prescription is due for a refill, you may be due for a follow-up appointment.  We cannot refill your maintenance medications at a preventative wellness visit.  To best provide you care, patients receiving maintenance medications need to be seen at least twice a year.

## 2024-11-12 ENCOUNTER — PATIENT MESSAGE (OUTPATIENT)
Dept: FAMILY MEDICINE CLINIC | Facility: CLINIC | Age: 49
End: 2024-11-12

## 2024-11-12 DIAGNOSIS — K21.9 GASTROESOPHAGEAL REFLUX DISEASE, UNSPECIFIED WHETHER ESOPHAGITIS PRESENT: Primary | ICD-10-CM

## 2024-11-23 ENCOUNTER — PATIENT MESSAGE (OUTPATIENT)
Facility: CLINIC | Age: 49
End: 2024-11-23

## 2024-11-25 ENCOUNTER — TELEPHONE (OUTPATIENT)
Facility: CLINIC | Age: 49
End: 2024-11-25

## 2024-11-25 NOTE — TELEPHONE ENCOUNTER
Future Appointments   Date Time Provider Department Center   11/25/2024  1:40 PM Jorge Toussaint,  [facility]THOP[facility] [address]   11/26/2024  7:45 AM Marvin Catalan MD [facility] DERM [facility]   11/27/2024  8:00 AM Chuyita Koenig MD [facility]GOTONAPER [id number]   3/31/2025 10:00 AM Pablito nKight MD [facility] [address] [facility] [address]   5/1/2025 11:00 AM Carlos Toledo MD [id number] EC [address]

## 2024-11-25 NOTE — TELEPHONE ENCOUNTER
Patient arrived late to appointment today at 1:40pm. Patient asked if Dr. Toussaint would please write a letter to help move him through security during his travel next week. Please advise.   Patient rescheduled for 12/2.

## 2024-11-26 DIAGNOSIS — S76.012D MUSCLE STRAIN OF LEFT HIP, SUBSEQUENT ENCOUNTER: ICD-10-CM

## 2024-11-26 DIAGNOSIS — R93.7 BONE MARROW EDEMA: ICD-10-CM

## 2024-11-26 DIAGNOSIS — M70.62 TROCHANTERIC BURSITIS OF LEFT HIP: ICD-10-CM

## 2024-11-26 DIAGNOSIS — S32.592D PUBIC RAMUS FRACTURE, LEFT, WITH ROUTINE HEALING, SUBSEQUENT ENCOUNTER: ICD-10-CM

## 2024-11-26 RX ORDER — TRAMADOL HYDROCHLORIDE 50 MG/1
TABLET ORAL
Qty: 20 TABLET | Refills: 1 | Status: SHIPPED | OUTPATIENT
Start: 2024-11-26 | End: 2024-11-26

## 2024-11-26 RX ORDER — CELECOXIB 200 MG/1
200 CAPSULE ORAL 2 TIMES DAILY
Qty: 60 CAPSULE | Refills: 0 | Status: SHIPPED | OUTPATIENT
Start: 2024-11-26 | End: 2024-12-02

## 2024-11-26 NOTE — TELEPHONE ENCOUNTER
Celecoxib    DOS: n/a  Last OV: 10/9/24 Lumbar spondylosis  Last refill date: 10/28/24 #/refills: 60/0  Upcoming appt:   Future Appointments   Date Time Provider Department Center   12/2/2024 12:20 PM Jorge Toussaint DO [facility] ORTHOP[facility] [address]     Component      Latest Ref Rng 10/28/2024   Glucose      70 - 99 mg/dL 108 (H)    Sodium      136 - 145 mmol/L 138    Potassium      3.5 - 5.1 mmol/L 4.0    Chloride      98 - 112 mmol/L 104    Carbon Dioxide, Total      21.0 - 32.0 mmol/L 29.0    ANION GAP      0 - 18 mmol/L 5    BUN      9 - 23 mg/dL 26 (H)    CREATININE      0.70 - 1.30 mg/dL 0.99    CALCIUM      8.7 - 10.4 mg/dL 10.1    CALCULATED OSMOLALITY      275 - 295 mOsm/kg 291    EGFR      >=60 mL/min/1.73m2 93    AST (SGOT)      <34 U/L 27    ALT (SGPT)      10 - 49 U/L 29    ALKALINE PHOSPHATASE      45 - 117 U/L 64    Total Bilirubin      0.3 - 1.2 mg/dL 0.7    PROTEIN, TOTAL      5.7 - 8.2 g/dL 7.6    Albumin      3.2 - 4.8 g/dL 4.8    Globulin      2.0 - 3.5 g/dL 2.8    A/G Ratio      1.0 - 2.0  1.7    Patient Fasting for CMP? Yes       Legend:  (H) High

## 2024-11-27 ENCOUNTER — OFFICE VISIT (OUTPATIENT)
Facility: LOCATION | Age: 49
End: 2024-11-27
Payer: COMMERCIAL

## 2024-11-27 DIAGNOSIS — K21.9 GASTROESOPHAGEAL REFLUX DISEASE, UNSPECIFIED WHETHER ESOPHAGITIS PRESENT: Primary | ICD-10-CM

## 2024-11-27 PROCEDURE — 99213 OFFICE O/P EST LOW 20 MIN: CPT | Performed by: STUDENT IN AN ORGANIZED HEALTH CARE EDUCATION/TRAINING PROGRAM
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
## 2024-12-02 ENCOUNTER — OFFICE VISIT (OUTPATIENT)
Facility: CLINIC | Age: 49
End: 2024-12-02
Payer: COMMERCIAL

## 2024-12-02 VITALS — HEIGHT: 74 IN | BODY MASS INDEX: 24.26 KG/M2 | WEIGHT: 189 LBS

## 2024-12-02 DIAGNOSIS — S46.102A INJURY OF TENDON OF LONG HEAD OF BICEPS, LEFT, INITIAL ENCOUNTER: ICD-10-CM

## 2024-12-02 DIAGNOSIS — S43.432D GLENOID LABRAL TEAR, LEFT, SUBSEQUENT ENCOUNTER: Primary | ICD-10-CM

## 2024-12-02 DIAGNOSIS — M65.979 PERONEAL TENOSYNOVITIS: ICD-10-CM

## 2024-12-02 DIAGNOSIS — M75.102 ROTATOR CUFF SYNDROME OF LEFT SHOULDER: ICD-10-CM

## 2024-12-02 PROCEDURE — 76942 ECHO GUIDE FOR BIOPSY: CPT | Performed by: FAMILY MEDICINE

## 2024-12-02 PROCEDURE — 99214 OFFICE O/P EST MOD 30 MIN: CPT | Performed by: FAMILY MEDICINE

## 2024-12-02 PROCEDURE — 20550 NJX 1 TENDON SHEATH/LIGAMENT: CPT | Performed by: FAMILY MEDICINE

## 2024-12-02 PROCEDURE — 20611 DRAIN/INJ JOINT/BURSA W/US: CPT | Performed by: FAMILY MEDICINE

## 2024-12-02 RX ORDER — KETOROLAC TROMETHAMINE 30 MG/ML
30 INJECTION, SOLUTION INTRAMUSCULAR; INTRAVENOUS ONCE
Status: COMPLETED | OUTPATIENT
Start: 2024-12-02 | End: 2024-12-02

## 2024-12-02 RX ORDER — BETAMETHASONE SODIUM PHOSPHATE AND BETAMETHASONE ACETATE 3; 3 MG/ML; MG/ML
6 INJECTION, SUSPENSION INTRA-ARTICULAR; INTRALESIONAL; INTRAMUSCULAR; SOFT TISSUE ONCE
Status: COMPLETED | OUTPATIENT
Start: 2024-12-02 | End: 2024-12-02

## 2024-12-02 RX ORDER — TRIAMCINOLONE ACETONIDE 40 MG/ML
40 INJECTION, SUSPENSION INTRA-ARTICULAR; INTRAMUSCULAR ONCE
Status: COMPLETED | OUTPATIENT
Start: 2024-12-02 | End: 2024-12-02

## 2024-12-02 RX ADMIN — BETAMETHASONE SODIUM PHOSPHATE AND BETAMETHASONE ACETATE 6 MG: 3; 3 INJECTION, SUSPENSION INTRA-ARTICULAR; INTRALESIONAL; INTRAMUSCULAR; SOFT TISSUE at 13:28:00

## 2024-12-02 RX ADMIN — KETOROLAC TROMETHAMINE 30 MG: 30 INJECTION, SOLUTION INTRAMUSCULAR; INTRAVENOUS at 13:28:00

## 2024-12-02 RX ADMIN — TRIAMCINOLONE ACETONIDE 40 MG: 40 INJECTION, SUSPENSION INTRA-ARTICULAR; INTRAMUSCULAR at 13:28:00

## 2024-12-02 NOTE — PROGRESS NOTES
Sports Medicine Clinic Note    Subjective:    Chief Complaint: Follow-up for chronic low back pain, left hip post-surgery status, and bilateral shoulder pain with worsening symptoms.    Interval History: The patient is a 49-year-old male returning for follow-up regarding chronic low back pain, left hip post-surgery status, and persistent bilateral shoulder pain. He reports slight worsening of the anterior shoulder pain, especially in the bicipital groove region bilaterally. The patient notes that while physical therapy has helped maintain some shoulder function, the pain has become more bothersome in recent weeks, particularly during overhead activities. He describes occasional night pain and difficulty with daily activities due to the discomfort. His left hip continues to recover well following surgery, with no new concerns regarding that area. Low back pain seems to be worsening and he requests pain management referral today. The patient is eager to proceed with further pain management strategies for his shoulders and lumbar spine. He also expresses interest in receiving injections for the bilateral biceps tendons, as prior injections provided temporary relief.    Objective:    Bilateral Shoulder Examination:    Inspection: No visible swelling or deformities. No skin changes or erythema noted.   Palpation: Tenderness continues to be localized over the bicipital groove bilaterally. No tenderness noted over the acromioclavicular joint, subacromial space, or glenohumeral joint.   Range of Motion: Flexion 0-170 degrees with increased discomfort at end range. Abduction 0-90 degrees with mild to moderate pain. Internal rotation 0-65 degrees with discomfort. External rotation 0-85 degrees with discomfort.   Neurovascular: Grossly intact sensation to light touch in both upper extremities. Radial and ulnar pulses are 2+ bilaterally.   Special Tests: Speed’s Test: Positive bilaterally with worsening anterior shoulder pain.  Yergason’s Test: Positive bilaterally. Empty Can Test: Negative. O’Gerber’s Test: Negative.    Lumbar Spine Examination:    Inspection: No abnormal curvature or deformities noted.   Palpation: Tenderness to palpation over the bilateral lumbar paraspinal muscles and lower lumbar spinous processes.   Range of Motion: Flexion, extension, lateral bending, and rotation are limited by stiffness and discomfort.   Neurovascular: Sensation intact to light touch throughout bilateral lower extremities.   Motor strength 5/5 in all major muscle groups of the lower extremities.   Special Tests: Straight Leg Raise Test: Negative bilaterally.    Diagnostic Tests:    No new imaging. Prior MRI Lumbar Spine shows lumbar spondylosis with moderate facet arthropathy, mild to moderate central canal stenosis at L4-5 with left-sided neural foraminal narrowing, and degenerative disc disease at L5-S1 without significant neural impingement.    Assessment:    Bilateral long head of biceps tendinopathy with worsening pain.  Chronic low back pain secondary to lumbar spondylosis and facet arthropathy, stable with mild to moderate central canal stenosis at L4-5.  Left hip pain status post-surgical intervention, improving.    Plan:    Imaging/Workup: No new imaging is needed for the shoulders at this time.  Therapy: Continue physical therapy with an emphasis on shoulder stabilization and rotator cuff strengthening. Continue home exercises for lumbar stabilization.  Medications: Continue Tylenol and NSAIDs for pain management as needed.  Procedures: Bilateral ultrasound-guided injections into the bicipital tendon sheaths were performed today for targeted pain relief. The patient tolerated the procedures well. Referral to pain management is initiated for evaluation of the chronic low back pain, including consideration of interventional options such as epidural steroid injections.  Activity Recommendations: Continue to limit overhead lifting and  repetitive shoulder movements. Gentle range of motion and shoulder exercises are encouraged. Avoid any activities that may aggravate lumbar or shoulder pain.    Follow-Up: Tentatively scheduled follow-up in four weeks to assess shoulder pain and response to injections. Further follow-up with pain management pending initial consultation. Return sooner if symptoms worsen or if there are new concerns.    Ultrasound Guided Procedure Note:    After discussion of the risks and benefits, the patient elected to proceed with a therapeutic injection into the bilateral long head bicep tenosynovial sheath within the bicipital groove under US guidance. Confirmed that the patient does not have history of prior adverse reactions, active infections, or relevant allergies. There was no erythema or warmth, and the skin was clear.    For each side:    The skin was sterilized with ChloraPrep. A 22 gauge needle was inserted via lateral approach utilizing US for needle guidance and placement. The site was injected with a mixture of 1 mL of betamethasone 6 mg/mL, 1 mL of ketorolac 30 mg/mL and 0.5 mL of 1% Lidocaine without Epinephrine. The injection was completed without complication, and a bandage was applied.    The patient tolerated the procedure well and was instructed to avoid strenuous activity for the next 24-48 hours and to use ice or Tylenol for pain as needed. The patient will call immediately with any signs of infection or allergic reaction.    Post-Injection Care: The patient tolerated the procedure well. An occlusive bandage was placed over the injection site. Post-injection care instructions provided to the patient. The patient was asked to avoid strenuous activity and continue to rest the area for 2-3 days before resuming regular activities. Patient advised that the area may be more painful for the first 1-2 days. They can use ice or Tylenol for pain as needed.  Patient was instructed to watch for fever, increased  swelling, or persistent pain. The patient will call immediately with any signs of infection or allergic reaction.    Complications: The patient tolerated the procedure well without any complications.      Jorge Toussaint DO, LEEM   Primary Care Sports Medicine    Department of Orthopaedic Surgery  [facility]    [address] [place] [id number]  [address] [place] [address]    t: [phone]  f: [phone]      [facility]

## 2024-12-03 NOTE — H&P
Sports Medicine Clinic Note    Subjective:    Chief Complaint: Left shoulder pain and chronic lateral foot pain.    Date of Injury: 11/17/2024 (shoulder).    History: 49-year-old male presenting with left shoulder pain after falling down two steps on 11/17/2024, breaking his fall with his left arm. He describes intermittent sharp, shooting pains, worsened by exercise and alleviated with rest. Pain level reported as 5/10, waking him at night occasionally. Reports prior injections for similar pain with temporary relief. Additionally, he reports chronic lateral left foot pain previously diagnosed as peroneal tenosynovitis, with tenderness persisting despite conservative management.    Objective:    Left Shoulder Examination:    Inspection: No visible swelling, erythema, or deformity noted.  Palpation: Tenderness localized to the posterior shoulder joint and bicipital groove. No significant tenderness over the acromioclavicular joint or subacromial space.  Range of Motion: Full range of motion with pain during abduction in the mid-arc and discomfort at end range of flexion.  Neurovascular: Sensation intact to light touch in axillary, radial, ulnar, and median nerve distributions. Radial pulse palpable with brisk capillary refill.  Special Tests: Positive Coronel-Alberto and Neer tests. Negative Speed’s, Yergason’s, and O’Gerber’s tests.    Left Foot Examination:    Inspection: Neutral foot alignment with no visible deformities or swelling.  Palpation: Tenderness localized along the peroneal tendon distribution, primarily near the lateral malleolus. No tenderness over the Achilles tendon or plantar fascia.  Range of Motion: Full range of motion at the ankle with mild discomfort during eversion.  Neurovascular: Sensation intact to light touch throughout the superficial and deep peroneal, tibial, and sural nerve distributions. Dorsalis pedis and posterior tibial pulses are palpable with brisk capillary refill.  Special  Tests: Positive resisted eversion test reproducing pain along the peroneal tendon distribution. Negative anterior drawer test and Perry test.    Diagnostic Tests:    Radiographs of the left foot, WB, demonstrate no fracture, dislocation, or degenerative changes.  Ultrasound evaluation of the left lateral foot reveals evidence of peroneal tenosynovitis with mild synovial thickening and increased vascularity.    Assessment:    Left shoulder rotator cuff syndrome vs sequela of labral tear with clinical findings suggestive of biceps tendinopathy.  Chronic left foot peroneal tenosynovitis.    Plan:    Procedures: Performed ultrasound-guided corticosteroid injection into the left glenohumeral joint and the peroneal tendon sheath for symptomatic relief.  Medications: Recommend NSAIDs and Acetaminophen as needed for pain control.  Therapy: Refer to physical therapy for shoulder stabilization and rotator cuff strengthening. Recommend physical therapy for peroneal tendon strengthening and ankle stability exercises.  Activity Recommendations: Avoid overhead activities, heavy lifting, and repetitive arm movements for the next week.  Avoid excessive walking, running, or activities that stress the lateral foot for the next week. Gradual return as symptoms permit.    Follow-Up: Tentatively scheduled in 3 to 4 weeks to reassess response to injections and physical therapy progress. Patient instructed to return sooner if symptoms worsen or new issues arise.    Ultrasound Guided Procedure Note (Multiple Procedures):    Procedures: Therapeutic injections into the left glenohumeral joint and left peroneal tenosynovial sheath near the lateral malleolus  Guidance Method: Ultrasound guidance  Pre-Procedure Evaluation: Discussed risks, benefits, and alternatives with the patient. The patient elected to proceed. Confirmed absence of prior adverse reactions, active infections, or relevant allergies.   Pre-procedure skin assessment: No  erythema, warmth, or abnormalities noted.   Preparation and Sterilization: Skin sterilized with ChloraPrep at the intended injection sites.  Needle Type: 22 gauge.  Approach: Utilizing US for needle guidance and placement.  Injectate: A mixture of 1 mL of triamcinolone 40 mg/mL, 1 mL of ketorolac 30 mg/mL and 1 mL of 1% Lidocaine without Epinephrine, administered at the glenohumeral site. A mixture of 1 mL of betamethasone 6 mg/mL, 1 mL of ketorolac 30 mg/mL and 1 mL of 1% Lidocaine without Epinephrine, administered at the peroneal site.  Completion: The procedures were completed without complication; and occlusive bandages were applied post-injections.  Post-Procedure Instructions: Advised the patient to avoid strenuous activity for 24-48 hours.  Pain management: Use ice or Tylenol as needed.  Monitoring: Patient instructed to observe for signs of infection or allergic reaction (e.g., fever, increased swelling, persistent pain) and to contact the clinic immediately if any such signs occur.  Follow-Up: Instructions: Rest the treated area for 2-3 days before resuming regular activities. The area may be more painful for the first 1-2 days post-procedure.  Complications: The patient tolerated the procedures well with no immediate complications noted.      Jorge Toussaint DO, CAM   Primary Care Sports Medicine

## 2024-12-04 ENCOUNTER — TELEPHONE (OUTPATIENT)
Dept: ORTHOPEDICS CLINIC | Facility: CLINIC | Age: 49
End: 2024-12-04

## 2024-12-04 NOTE — TELEPHONE ENCOUNTER
Benefits outways risks ok to proceed  
Clarification sent to provider  
Completed form and faxed back to [facility].  
Pharmacy faxed prescriber response form for RN completion.     Interaction with tramadol and duloxetine.   Tramadol recently prescribed short term for break-through pain.   Do you want to continue with dispense or modify therapy?    Please route back to RN pool. Karina with form, will complete and fax back to [facility].      
(2) Male

## 2024-12-26 ENCOUNTER — LAB ENCOUNTER (OUTPATIENT)
Dept: LAB | Age: 49
End: 2024-12-26
Attending: PODIATRIST
Payer: COMMERCIAL

## 2024-12-26 PROCEDURE — 80076 HEPATIC FUNCTION PANEL: CPT | Performed by: PODIATRIST

## 2024-12-26 PROCEDURE — 36415 COLL VENOUS BLD VENIPUNCTURE: CPT | Performed by: PODIATRIST

## 2024-12-28 DIAGNOSIS — G47.9 SLEEP DISTURBANCE: ICD-10-CM

## 2024-12-28 RX ORDER — TRAZODONE HYDROCHLORIDE 50 MG/1
50 TABLET, FILM COATED ORAL NIGHTLY
Qty: 90 TABLET | Refills: 0 | Status: SHIPPED | OUTPATIENT
Start: 2024-12-28

## 2024-12-28 RX ORDER — ALUMINUM CHLORIDE 20 %
1 SOLUTION, NON-ORAL TOPICAL NIGHTLY
Qty: 75 ML | Refills: 0 | Status: SHIPPED | OUTPATIENT
Start: 2024-12-28

## 2025-01-03 ENCOUNTER — PATIENT MESSAGE (OUTPATIENT)
Dept: FAMILY MEDICINE CLINIC | Facility: CLINIC | Age: 50
End: 2025-01-03

## 2025-01-03 DIAGNOSIS — R74.01 ELEVATED AST (SGOT): Primary | ICD-10-CM

## 2025-01-06 ENCOUNTER — TELEPHONE (OUTPATIENT)
Dept: PODIATRY CLINIC | Facility: CLINIC | Age: 50
End: 2025-01-06

## 2025-01-06 NOTE — TELEPHONE ENCOUNTER
Pt's message 12-28-24.  Pt called.  Pt has been cleared by pt's primary care doctor for the refill.  Please send to [facility], [place].  How many more rounds of medication will pt need.  Call pt

## 2025-01-08 LAB
ALBUMIN/GLOBULIN RATIO: 1.6 (CALC) (ref 1–2.5)
ALBUMIN: 4.3 G/DL (ref 3.6–5.1)
ALKALINE PHOSPHATASE: 69 U/L (ref 36–130)
ALT: 25 U/L (ref 9–46)
AST: 22 U/L (ref 10–40)
BILIRUBIN, TOTAL: 0.3 MG/DL (ref 0.2–1.2)
BUN/CREATININE RATIO: 31 (CALC) (ref 6–22)
BUN: 31 MG/DL (ref 7–25)
CALCIUM: 9.9 MG/DL (ref 8.6–10.3)
CARBON DIOXIDE: 28 MMOL/L (ref 20–32)
CHLORIDE: 101 MMOL/L (ref 98–110)
CREATININE: 0.99 MG/DL (ref 0.6–1.29)
EGFR: 93 ML/MIN/1.73M2
GLOBULIN: 2.7 G/DL (CALC) (ref 1.9–3.7)
GLUCOSE: 101 MG/DL (ref 65–99)
POTASSIUM: 4.5 MMOL/L (ref 3.5–5.3)
PROTEIN, TOTAL: 7 G/DL (ref 6.1–8.1)
SODIUM: 138 MMOL/L (ref 135–146)

## 2025-01-08 RX ORDER — TERBINAFINE HYDROCHLORIDE 250 MG/1
250 TABLET ORAL DAILY
Qty: 30 TABLET | Refills: 0 | Status: CANCELLED | OUTPATIENT
Start: 2025-01-08

## 2025-01-08 NOTE — TELEPHONE ENCOUNTER
Please refill Lamisil tablets 250 mg #45 and have patient follow-up in 8 weeks instructions take 1 tablet daily p.o. no refills

## 2025-01-09 RX ORDER — TERBINAFINE HYDROCHLORIDE 250 MG/1
250 TABLET ORAL DAILY
Qty: 15 TABLET | Refills: 0 | Status: SHIPPED | OUTPATIENT
Start: 2025-01-09

## 2025-01-28 ENCOUNTER — PATIENT MESSAGE (OUTPATIENT)
Dept: PODIATRY CLINIC | Facility: CLINIC | Age: 50
End: 2025-01-28

## 2025-01-28 DIAGNOSIS — B35.1 ONYCHOMYCOSIS: Primary | ICD-10-CM

## 2025-01-29 ENCOUNTER — TELEPHONE (OUTPATIENT)
Dept: PODIATRY CLINIC | Facility: CLINIC | Age: 50
End: 2025-01-29

## 2025-01-29 NOTE — TELEPHONE ENCOUNTER
I do not prescribe the prescription to [facility] because it said [facility] I signed off on it.  Could you please check this and just do the hepatic function panel for [facility] if it is not in already thank you

## 2025-01-29 NOTE — TELEPHONE ENCOUNTER
Pt's message 1-28-25.  Pt is at [facility] now 1-29-25.  They do not have the order.  Please send.  Call pt

## 2025-01-29 NOTE — TELEPHONE ENCOUNTER
Should have read I did prescribe it to [facility] could you please check and represcribe it if needed thank you very much for your help

## 2025-01-30 NOTE — TELEPHONE ENCOUNTER
Dr Casey, see patient message did you want him to continue with lamisal still , states he already completed 3 rounds?

## 2025-01-30 NOTE — TELEPHONE ENCOUNTER
No he does not need anymore but he should see me again in a couple of months to see how he is doing

## 2025-01-31 NOTE — TELEPHONE ENCOUNTER
Please let the patient know that a hepatic function panel has been ordered he can complete that at any [facility] facility.  Please let him know it is not a fasting test

## 2025-01-31 NOTE — TELEPHONE ENCOUNTER
Please see response and advise if any blood work is needed at this time for patient now that he has completed the course of Lamisil

## 2025-02-03 DIAGNOSIS — G47.9 SLEEP DISTURBANCE: ICD-10-CM

## 2025-02-04 RX ORDER — TRAZODONE HYDROCHLORIDE 50 MG/1
50 TABLET, FILM COATED ORAL NIGHTLY
Qty: 90 TABLET | Refills: 0 | Status: SHIPPED | OUTPATIENT
Start: 2025-02-04

## 2025-02-07 DIAGNOSIS — S32.592D PUBIC RAMUS FRACTURE, LEFT, WITH ROUTINE HEALING, SUBSEQUENT ENCOUNTER: ICD-10-CM

## 2025-02-07 DIAGNOSIS — R93.7 BONE MARROW EDEMA: ICD-10-CM

## 2025-02-10 RX ORDER — TRAMADOL HYDROCHLORIDE 50 MG/1
TABLET ORAL
Qty: 20 TABLET | Refills: 1 | OUTPATIENT
Start: 2025-02-10

## 2025-02-10 NOTE — TELEPHONE ENCOUNTER
Tramadol    DOS: n/a  Last OV: 12/2/24  Last refill date: 12/29/24 #/refills: 20/0  Upcoming appt:   Future Appointments   Date Time Provider Department Center   2/17/2025 10:40 AM Jorge Toussaint,  [facility] [address]     Refill not appropriate

## 2025-02-13 ENCOUNTER — OFFICE VISIT (OUTPATIENT)
Dept: PODIATRY CLINIC | Facility: CLINIC | Age: 50
End: 2025-02-13

## 2025-02-13 DIAGNOSIS — B35.1 ONYCHOMYCOSIS: Primary | ICD-10-CM

## 2025-02-13 PROCEDURE — 99213 OFFICE O/P EST LOW 20 MIN: CPT | Performed by: PODIATRIST
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
## 2025-02-17 ENCOUNTER — OFFICE VISIT (OUTPATIENT)
Facility: CLINIC | Age: 50
End: 2025-02-17
Payer: COMMERCIAL

## 2025-02-17 VITALS — HEIGHT: 74 IN | BODY MASS INDEX: 24.26 KG/M2 | WEIGHT: 189 LBS

## 2025-02-17 DIAGNOSIS — M65.979 PERONEAL TENOSYNOVITIS: Primary | ICD-10-CM

## 2025-02-17 DIAGNOSIS — M79.18 CHRONIC MUSCULOSKELETAL PAIN: ICD-10-CM

## 2025-02-17 DIAGNOSIS — G89.29 CHRONIC MUSCULOSKELETAL PAIN: ICD-10-CM

## 2025-02-17 PROCEDURE — 99214 OFFICE O/P EST MOD 30 MIN: CPT | Performed by: FAMILY MEDICINE

## 2025-02-17 PROCEDURE — 76942 ECHO GUIDE FOR BIOPSY: CPT | Performed by: FAMILY MEDICINE

## 2025-02-17 PROCEDURE — 20550 NJX 1 TENDON SHEATH/LIGAMENT: CPT | Performed by: FAMILY MEDICINE

## 2025-02-17 RX ORDER — TRAMADOL HYDROCHLORIDE 50 MG/1
TABLET ORAL
Qty: 10 TABLET | Refills: 1 | Status: SHIPPED | OUTPATIENT
Start: 2025-02-17

## 2025-02-17 RX ORDER — KETOROLAC TROMETHAMINE 30 MG/ML
30 INJECTION, SOLUTION INTRAMUSCULAR; INTRAVENOUS ONCE
Status: COMPLETED | OUTPATIENT
Start: 2025-02-17 | End: 2025-02-17

## 2025-02-17 RX ORDER — BETAMETHASONE SODIUM PHOSPHATE AND BETAMETHASONE ACETATE 3; 3 MG/ML; MG/ML
6 INJECTION, SUSPENSION INTRA-ARTICULAR; INTRALESIONAL; INTRAMUSCULAR; SOFT TISSUE ONCE
Status: COMPLETED | OUTPATIENT
Start: 2025-02-17 | End: 2025-02-17

## 2025-02-17 RX ADMIN — KETOROLAC TROMETHAMINE 30 MG: 30 INJECTION, SOLUTION INTRAMUSCULAR; INTRAVENOUS at 10:56:00

## 2025-02-17 RX ADMIN — BETAMETHASONE SODIUM PHOSPHATE AND BETAMETHASONE ACETATE 6 MG: 3; 3 INJECTION, SUSPENSION INTRA-ARTICULAR; INTRALESIONAL; INTRAMUSCULAR; SOFT TISSUE at 10:56:00

## 2025-02-17 NOTE — H&P
Sports Medicine Clinic Note    Subjective:    Chief Complaint: Right foot pain    History: 49-year-old male presents with new complaint of right foot pain. He previously initiated physical therapy after seeing podiatry for this issue but reports ongoing discomfort. He states his therapist suspects a component of peroneal tenosynovitis and insertional tendinopathy, given pain localized to the base of the fifth metatarsal extending along the peroneal tendon distribution. 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 He previously experienced good relief from a corticosteroid injection for peroneal tendinopathy on the left side and is hoping for similar treatment today. He is leaving for a trip soon and seeks symptom relief before then.    Objective:    Right Foot Examination:    Inspection: No significant swelling or erythema noted. No gross deformities.  Palpation: Tenderness at the base of the fifth metatarsal and along the peroneal tendon course. No tenderness at the midfoot or plantar surface.  Range of Motion: Full ankle and subtalar joint range of motion without significant restriction. Mild discomfort with inversion.  Neurovascular: Sensation intact to light touch in tibial, superficial, and deep peroneal nerve distributions. Pedal pulses are palpable with brisk capillary refill.  Special Tests: Mild discomfort with resisted eversion, consistent with peroneal tendon involvement. No instability with anterior drawer or inversion stress.    Diagnostic Tests:    Right foot radiographs (4/4/24) revealed demineralization with joint space narrowing at the 1st through 5th interphalangeal joints. No acute fractures, dislocations, or erosive changes were observed.    Assessment:    Suspected peroneal tenosynovitis and insertional tendinopathy of the right foot.  Underlying osteopenia with chronic musculoskeletal pain.    Plan:    Procedures: Ultrasound-guided corticosteroid injection  performed today at the peroneal tendon and insertional region.  Therapy: Continue physical therapy with a focus on peroneal tendon rehabilitation and proprioceptive training.  Medications: NSAIDs as needed for pain management.  Bracing: Consider lateral wedge orthotic support or ankle bracing if symptoms persist.  Activity Recommendations: Limit high-impact activities. Modify footwear for improved arch and lateral foot support.    Follow-Up: Tentatively scheduled after his trip to reassess symptoms. MRI may be considered if pain persists or worsens.    - - -    Ultrasound Guided Procedure Note:    After discussion of the risks and benefits, the patient elected to proceed with an ultrasound guided injection into the peroneal tenosynovial sheath near its insertion on the 5th metatarsal, right side. Confirmed that the patient does not have history of prior adverse reactions, active infections, or relevant allergies. There was no erythema or warmth, and the skin was clear.     The skin was sterilized with ChloraPrep. A 25 gauge needle was inserted via inferolateral approach utilizing US for needle guidance and placement. The site was injected with a mixture of 1 mL of betamethasone 6 mg/mL, 1 mL of ketorolac 30 mg/mL and 0.5 mL of 1% Lidocaine without Epinephrine. The injection was completed without complication, and a bandage was applied. The patient tolerated the procedure well and was instructed to avoid strenuous activity for the next 24-48 hours and to use ice or Tylenol for pain as needed. The patient will call immediately with any signs of infection or allergic reaction.    Post-Injection Care: The patient tolerated the procedure well. An occlusive bandage was placed over the injection site. Post-injection care instructions provided to the patient. The patient was asked to avoid strenuous activity and continue to rest the area for 2-3 days before resuming regular activities. Patient advised that the area may be  more painful for the first 1-2 days. They can use ice or Tylenol for pain as needed.  Patient was instructed to watch for fever, increased swelling, or persistent pain. The patient will call immediately with any signs of infection or allergic reaction.      Jorge Toussaint DO, [facility]   Primary Care Sports Medicine

## 2025-02-28 ENCOUNTER — OFFICE VISIT (OUTPATIENT)
Dept: PAIN CLINIC | Facility: CLINIC | Age: 50
End: 2025-02-28
Payer: COMMERCIAL

## 2025-02-28 VITALS
SYSTOLIC BLOOD PRESSURE: 128 MMHG | BODY MASS INDEX: 25 KG/M2 | DIASTOLIC BLOOD PRESSURE: 74 MMHG | OXYGEN SATURATION: 98 % | WEIGHT: 194 LBS | HEART RATE: 84 BPM

## 2025-02-28 DIAGNOSIS — M47.816 LUMBAR SPONDYLOSIS: Primary | ICD-10-CM

## 2025-02-28 PROCEDURE — 99204 OFFICE O/P NEW MOD 45 MIN: CPT | Performed by: ANESTHESIOLOGY

## 2025-02-28 RX ORDER — CELECOXIB 200 MG/1
CAPSULE ORAL
COMMUNITY
Start: 2025-02-23
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
## 2025-02-28 NOTE — PROGRESS NOTES
Subjective:   Patient ID: Arron Gutierrez is a 49 year old male.    Consult        History/Other:   Review of Systems  Current Outpatient Medications   Medication Sig Dispense Refill    traMADol 50 MG Oral Tab Please take 1 to 2 tablets every 4 hours as needed for pain. 10 tablet 1    traZODone 50 MG Oral Tab Take 1 tablet (50 mg total) by mouth nightly. 90 tablet 0    terbinafine 250 MG Oral Tab Take 1 tablet (250 mg total) by mouth daily. 15 tablet 0    Aluminum Chloride (DRYSOL) 20 % External Solution Apply 1 Application topically nightly. 75 mL 0    Ammonium Lactate 12 % External Cream To dry skin on the left ankle 280 g 2    Desonide 0.05 % External Lotion Apply to eyebrows and cheeks once daily as needed 59 mL 2    BD PEN NEEDLE MINI U/F 31G X 5 MM Does not apply Misc       docusate sodium (COLACE) 100 MG Oral Cap Take 1 capsule (100 mg total) by mouth daily.      famotidine 40 MG Oral Tab Take 1 tablet (40 mg total) by mouth nightly as needed.      DULoxetine 60 MG Oral Cap DR Particles Take 1 capsule (60 mg total) by mouth daily. 90 capsule 2    ketoconazole 2 % External Cream Apply to face once or twice daily as needed 15 g 2    gabapentin 400 MG Oral Cap Take 1 capsule (400 mg total) by mouth 2 (two) times daily. 60 capsule 5    tamsulosin 0.4 MG Oral Cap Take 1 capsule (0.4 mg total) by mouth every evening. 90 capsule 6    azelastine 0.1 % Nasal Solution 1 spray by Nasal route 2 (two) times daily.      levocetirizine 5 MG Oral Tab Take 1 tablet (5 mg total) by mouth nightly as needed.      glycopyrrolate 1 MG Oral Tab TAKE 1 TABLET BY MOUTH 3 TIMES DAILY. (Patient taking differently: Take 1 tablet (1 mg total) by mouth 2 (two) times daily.) 270 tablet 3    cephalexin 500 MG Oral Cap Take 1 capsule (500 mg total) by mouth 3 (three) times daily. 90 capsule 5    Teriparatide, Recombinant, (FORTEO) 600 MCG/2.4ML Subcutaneous Solution Pen-injector Inject 20 mcg into the skin daily.      cyclobenzaprine 5  MG Oral Tab Take 1 tablet (5 mg total) by mouth 3 (three) times daily as needed.      XIIDRA 5 % Ophthalmic Solution       baclofen 10 MG Oral Tab Take 1 tablet (10 mg total) by mouth 3 (three) times daily.       Allergies:Allergies[1]    Objective:   Physical Exam  Constitutional:              Assessment & Plan:   No diagnosis found.    No orders of the defined types were placed in this encounter.      Meds This Visit:  Requested Prescriptions      No prescriptions requested or ordered in this encounter       Imaging & Referrals:  None          Location of Pain: low back    Date Pain Began: 2020          Work Related:   No        Receiving Work Comp/Disability:   No    Numeric Rating Scale:  Pain at Present:  7                                                                                                            (No Pain) 0  to  10 (Worst Pain)                 Minimum Pain:   2  Maximum Pain  10    Distribution of Pain:    bilateral    Quality of Pain:   aching, sharp/stabbing, and shooting    Origin of Pain:    Other Neurotoxicity    Aggravating Factors:    Sitting and Standing    Past Treatments for Current Pain Condition:   Physical Therapy    Prior diagnostic testing for your pain:  Yes, downloaded         [1]   Allergies  Allergen Reactions    Carbamazepine FEVER, FACE FLUSHING and ITCHING

## 2025-03-03 ENCOUNTER — TELEPHONE (OUTPATIENT)
Dept: PAIN CLINIC | Facility: CLINIC | Age: 50
End: 2025-03-03

## 2025-03-03 DIAGNOSIS — M47.816 LUMBAR SPONDYLOSIS: Primary | ICD-10-CM

## 2025-03-03 NOTE — TELEPHONE ENCOUNTER
Prior Authorization for bilateral L4/5,L5/S1 MBB   initiated with  mame   CPT codes: 54101,82901  pending reference # [id number]  Clinical notes submitted via uploaded to chart

## 2025-03-05 NOTE — TELEPHONE ENCOUNTER
Patient advised of insurance approval to proceed with injections and is agreeable to scheduling. pre-procedure instructions reviewed. Patient prefers Local sedation. Reviewed sedation instructions including No Fasting & No  Required. Patient encouraged but not required to hold Celecoxib/NSAIDs for 24 hours prior to procedure. Patient verbalized understanding of instructions, no further needs at this time.       [facility] PAIN [facility]IN[facility]  PRE-PROCEDURE INSTRUCTIONS WITHOUT SEDATION    Procedure: Bilateral L4 ,L5 MBB       Appointment Date: 03/17/2025  Check-In Time: 10:30 AM    Follow-Up Date/Time: 04/09/2025 @ 10:00 AM    Prior to the procedure:  Please update us prior to the procedure if you are experiencing any symptoms of infection such as cough, fever, chills, urinary symptoms, or have recently been prescribed antibiotics, have open wounds, have recently had surgery or dental procedures.    Day of Procedure:  **Drivers will be required for patients who receive prescriptions for Valium.    NO FASTING REQUIRED  Please bring your Insurance Card, Photo ID, List of Current Medications and Referral (if applicable) to your appointment.  Please park in the [facility] and follow the signs to the [facility].  Check in at [facility] ([address] [place] [address]) outpatient registration in the [facility].  Please note-No prescriptions will be written by Pain Clinic in OR on the day of procedure. If you require a refill of medications, please contact the office 48 hours prior to your procedure.  If you have an implanted Spinal Cord or Peripheral Nerve Stimulator: Please remember to turn device off for procedure.        Medication Hold:    Number of days you need to be off for the following medications:    Aggrenox 10 days   Agrylin (Anagrelide) 10 days  Brilinta (Ticagrelor) 7 days  Imbruvica (Ibrutinib) 3 days   Enbrel (Etanercept) 24 hours   Fragmin (Dalteparin) 24 hours   Pletal  (Cilostazol) 7 days  Effient (Prasugrel) 7 days  Pradaxa 10 days  Trental 7 days  Eliquis (Apixaban) 3 days  Xarelto (Rivaroxaban) 3 days  Lovenox (Enoxaparin) 24 hours  Aspirin  Greater than 81mg but less than 325mg   5 days  325mg and greater                  7 days  Coumadin       5 days  Procedure may be cancelled if INR is elevated.   Excedrin (with aspirin) 7 days  Plavix (Clopidogrel)                            7 days    NSAIDs: 24 hours preferred      Ibuprofen (Motrin, Advil, Vicoprofen), Naproxen (Naprosyn, Aleve), Piroxcam (Feldene), Meloxicam (Mobic), Oxaprozin (Daypro), Diclofenac (Voltaren), Indomethacin (Indocin), Etodolac (Lodine), Nabumetone (Relafen), Celebrex (Celecoxib)           HERBAL SUPPLEMENTS  5 days preferred  Fish oil, krill oil, Omega-3, Vascepa, Vitamin E, Turmeric, Garlic                       Insurance Authorization:   Most insurances are now requiring a preauthorization for all procedures.  In the event that your insurance does not authorize your procedure within 48 hours of the scheduled date, your procedure will be cancelled and rescheduled to a later date.  Please contact your insurance carrier to determine what your financial responsibility will be for the procedure(s).      Cancellation/Rescheduling Appointment:   In the event you need to cancel or reschedule your appointment, you must notify the office 24 hours prior.    Post-procedure instructions:        Please schedule a follow up visit within 2 to 4 weeks after your last procedure date   Please call our office with any questions or concerns before or after your procedure at  [phone].  If you are a diabetic, please increase the frequency of your glucose monitoring after the procedure as this may cause a temporary increase in your blood sugar.  Contact your primary care physician if your blood sugar rises as you may require some medication adjustment.  It is normal to have increased pain at injection site for up to 3-5  days after procedure, you can use heat or ice (20 minutes on 20 minutes off) for comfort.    **To hear a recorded version of these instructions, please call [phone] and follow the prompts.  **Para escuchar las instrucciones en Español, por favor de llamar el keshia [phone] opción 4.

## 2025-03-05 NOTE — TELEPHONE ENCOUNTER
Prior authorization request completed for: bilateral L4/5,L5/S1 MBB  Authorization #  [id number]  Authorization dates: 3/3/25-8/30/25  CPT codes approved: 38946,46029  Number of visits/dates of service approved: 1  Physician: rc  Location: [facility]     Patient can be scheduled. Routed to Navigator.

## 2025-03-17 ENCOUNTER — HOSPITAL ENCOUNTER (OUTPATIENT)
Facility: HOSPITAL | Age: 50
Setting detail: HOSPITAL OUTPATIENT SURGERY
Discharge: HOME | End: 2025-03-17
Attending: ANESTHESIOLOGY | Admitting: ANESTHESIOLOGY
Payer: COMMERCIAL

## 2025-03-17 ENCOUNTER — APPOINTMENT (OUTPATIENT)
Dept: GENERAL RADIOLOGY | Facility: HOSPITAL | Age: 50
End: 2025-03-17
Attending: ANESTHESIOLOGY
Payer: COMMERCIAL

## 2025-03-17 VITALS
TEMPERATURE: 97 F | HEART RATE: 59 BPM | WEIGHT: 194 LBS | OXYGEN SATURATION: 100 % | DIASTOLIC BLOOD PRESSURE: 66 MMHG | SYSTOLIC BLOOD PRESSURE: 117 MMHG | HEIGHT: 74 IN | BODY MASS INDEX: 24.9 KG/M2 | RESPIRATION RATE: 18 BRPM

## 2025-03-17 PROCEDURE — 3E0R33Z INTRODUCTION OF ANTI-INFLAMMATORY INTO SPINAL CANAL, PERCUTANEOUS APPROACH: ICD-10-PCS | Performed by: ANESTHESIOLOGY

## 2025-03-17 PROCEDURE — 64493 INJ PARAVERT F JNT L/S 1 LEV: CPT | Performed by: ANESTHESIOLOGY

## 2025-03-17 PROCEDURE — 64494 INJ PARAVERT F JNT L/S 2 LEV: CPT | Performed by: ANESTHESIOLOGY

## 2025-03-17 RX ORDER — METHYLPREDNISOLONE ACETATE 40 MG/ML
INJECTION, SUSPENSION INTRA-ARTICULAR; INTRALESIONAL; INTRAMUSCULAR; SOFT TISSUE
Status: DISCONTINUED | OUTPATIENT
Start: 2025-03-17 | End: 2025-03-17

## 2025-03-17 RX ORDER — BUPIVACAINE HYDROCHLORIDE 5 MG/ML
INJECTION, SOLUTION EPIDURAL; INTRACAUDAL; PERINEURAL
Status: DISCONTINUED | OUTPATIENT
Start: 2025-03-17 | End: 2025-03-17

## 2025-03-17 RX ORDER — LIDOCAINE HYDROCHLORIDE 10 MG/ML
INJECTION, SOLUTION EPIDURAL; INFILTRATION; INTRACAUDAL; PERINEURAL
Status: DISCONTINUED | OUTPATIENT
Start: 2025-03-17 | End: 2025-03-17

## 2025-03-17 RX ORDER — NALOXONE HYDROCHLORIDE 0.4 MG/ML
0.08 INJECTION, SOLUTION INTRAMUSCULAR; INTRAVENOUS; SUBCUTANEOUS AS NEEDED
Status: DISCONTINUED | OUTPATIENT
Start: 2025-03-17 | End: 2025-03-17

## 2025-03-17 NOTE — OPERATIVE REPORT
[facility]  Operative Report  3/17/2025     Arron Gutierrez Patient Status:  Hospital Outpatient Surgery    1975 MRN [id number]   Location [facility] Attending Semaj Avila MD   Hosp Day # 0 PCP Pablito Knight MD     Indication: Arron is a 49 year old male with lumbar spondylosis    Preoperative Diagnosis:  Lumbar spondylosis [M47.816]    Postoperative Diagnosis: Same as above.    Procedure performed: BILATERAL LUMBAR  MEDIAL BRANCH BLOCK with local (L4-5, L5-S1)    Anesthesia: Local      EBL: Less than 1 ml.    Procedure Description:  After reviewing the patient's history and performing a focused physical examination, the diagnosis was confirmed and contraindications such as infection and coagulopathy were ruled out.  Following review of allergy,  potential side effects and complications, including but not necessarily limited to infection, allergic reaction, local tissue breakdown, nerve injury, and paresis, the patient indicated they understood and agreed to proceed.  After obtaining the informed consent, the patient was brought to the procedure room and monitored.      The patient was placed prone on the table and the back was prepped and draped in a sterile fashion.  Attention was turned towards the patient's right side first.  The skin and subcutaneous tissue was anesthetized via 25-gauge 1.5\" needle with approximately 2 mL of 1% lidocaine.  Under fluoroscopy guidance, a 25-gauge 3.5\" Quincke spinal needle was introduced and advanced along the superior margin of the L3, L4, L5 transverse process and lateral to the L3, L4, L5 superior articular process, and it was directed inferiorly and medially so that the tip struck the superior aspect of the junction of the transverse process and the supe¬rior articular process.  Following negative aspiration for CSF and blood, approximately 1 mL of 1% Lidocaine was injected via each needle without complication.  The similar  procedure was repeated at the contralateral side.  The needles were removed with tips intact.  The patient tolerated procedure very well.  No complications were observed during or immediately after the procedure.  The patient was observed until discharge criteria met.  Discharge instructions were given and patient was released to a responsible adult.    Complications: None.    Follow up:  The patient was followed in the pain clinic as needed basis.    Semaj Avila MD
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
## 2025-03-17 NOTE — DISCHARGE INSTRUCTIONS
Home Care Instructions Following Your Pain Procedure     Arron,  It has been a pleasure to have you as our patient. To help you at home, you must follow these general discharge instructions. We will review these with you before you are discharged. It is our hope that you have a complete and uneventful recovery from our procedure.     General Instructions:  What to Expect:  Bandages from your procedure today can be removed when you get home.  Please avoid soaking and/or swimming for 24 hours.  Showering is okay  It is normal to have increased pain symptoms and/or pain at injection site for up to 3-5 days after procedure, you can use heat or ice (20 minutes on 20 minutes off) for comfort.  You may experience some temporary side effects which may include restlessness or insomnia, flushing of the face, or heart palpitations.  Please contact the provider if these symptoms do not resolve within 3-4 days.  Lightheadedness or nausea may occur and should resolve within 24 to 48 hours.  If you develop a headache after treatment, rest, drink fluids (with caffeine, if possible) and take mild over-the-counter pain medication.  If the headache does not improve with the above treatment, contact the physician.  Home Medications:  Resume all previously prescribed medication.  Please avoid taking NSAIDs (Non-Steriodal Anti-Inflammatory Drugs) such as:  Ibuprofen ( Advil, Motrin) Aleve (Naproxen), Diclofenac, Meloxicam for 6 hours after procedure.   If you are on Coumadin (Warfarin) or any other anti-coagulant (or \"blood thinning\") medication such as Plavix (Clopidogrel), Xarelto (Rivaroxaban), Eliquis (Apixaban), Effient (Prasugrel) etc., restart on the following day from the procedure unless otherwise directed by your provider.  If you are a diabetic, please increase the frequency of your glucose monitoring after the procedure as steroids may cause a temporary (2-3 day) increase in your blood sugar.  Contact your primary care  physician if your blood sugar remains elevated as you may require some medication adjustment.  Diet:  Resume your regular diet as tolerated.  Activity:  We recommend that you relax and rest during the rest of your procedure day.  If you feel weakness in your arms or legs do not drive.  Follow-up Appointment  Please schedule a follow-up visit within 3 to 4 weeks after your last procedure date.  Question or Concerns:  Feel free to call our office with any questions or concerns at [phone] (option #2)    Arron  Thank you for coming to [facility] for your procedure.  The nurses try very hard to make sure you receive the best care possible.  Your trust in them as well as us is greatly appreciated.    Thanks so much,   Dr. Semaj Avila

## 2025-03-18 ENCOUNTER — MED REC SCAN ONLY (OUTPATIENT)
Facility: CLINIC | Age: 50
End: 2025-03-18

## 2025-03-19 DIAGNOSIS — M65.979 PERONEAL TENOSYNOVITIS: ICD-10-CM

## 2025-03-20 RX ORDER — TRAMADOL HYDROCHLORIDE 50 MG/1
TABLET ORAL
Qty: 20 TABLET | Refills: 1 | Status: SHIPPED | OUTPATIENT
Start: 2025-03-20

## 2025-03-20 NOTE — TELEPHONE ENCOUNTER
Tramadol    DOS: n/a  Last OV: 2/17/25  Peroneal tenosynovitis    Last refill date: 11/26/24 #/refills: 20/0  Upcoming appt: none

## 2025-03-31 ENCOUNTER — OFFICE VISIT (OUTPATIENT)
Dept: FAMILY MEDICINE CLINIC | Facility: CLINIC | Age: 50
End: 2025-03-31
Payer: COMMERCIAL

## 2025-03-31 VITALS
WEIGHT: 186 LBS | OXYGEN SATURATION: 98 % | HEIGHT: 74 IN | BODY MASS INDEX: 23.87 KG/M2 | RESPIRATION RATE: 16 BRPM | DIASTOLIC BLOOD PRESSURE: 72 MMHG | SYSTOLIC BLOOD PRESSURE: 118 MMHG | HEART RATE: 74 BPM | TEMPERATURE: 98 F

## 2025-03-31 DIAGNOSIS — M80.80XD OTHER OSTEOPOROSIS WITH CURRENT PATHOLOGICAL FRACTURE WITH ROUTINE HEALING, SUBSEQUENT ENCOUNTER: ICD-10-CM

## 2025-03-31 DIAGNOSIS — G62.9 NEUROPATHY: Primary | ICD-10-CM

## 2025-03-31 DIAGNOSIS — Z91.81 AT RISK FOR FALLING: ICD-10-CM

## 2025-03-31 PROCEDURE — 99214 OFFICE O/P EST MOD 30 MIN: CPT | Performed by: FAMILY MEDICINE

## 2025-03-31 RX ORDER — DULOXETIN HYDROCHLORIDE 30 MG/1
CAPSULE, DELAYED RELEASE ORAL
COMMUNITY
Start: 2024-12-16

## 2025-03-31 NOTE — PATIENT INSTRUCTIONS
Thank you for choosing Pablito Knight MD at [facility]  To Do: Arron Jared Gutierrez  1. Please see below   Call [phone] to schedule the appointment.   Please signup for [employer], which is electronic access to your record if you have not done so.  All your results will post on there.  [email or web address]   You can NOW use [employer] to book your appointments with us, or consider using open access scheduling which is through the [place] website [email or web address] and type in Pablito Knight MD and follow the links for \"Schedule Online Now\"    To schedule Imaging or tests at [facility] call Central Scheduling [phone], Go to [facility] ER Building (For example: CT scans, X rays, Ultrasound, MRI)  Cardiac Testing in ER building Building A second floor Cardiac Testing [phone] (For example: Holter Monitor, Cardiac Stress tests,Event Monitor, or 2D Echocardiograms)  [facility] call [phone] usually in [facility]  Walk in Clinic in [place] at [address] Mon-Fri at 8am-7:30 p.m., and Sat/Sun 9:00a.m.-4:30 p.m.  Also at [address] [place] [address]  Call [phone] for info     Please call our office about any questions regarding your treatment/medicines/tests as a result of today's visit.  For your safety, read the entire package insert of all medicines prescribed to you and be aware of all of the risks of treatment even beyond those discussed today.  All therapies have potential risk of harm or side effects or medication interactions.  It is your duty and for your safety to discuss with the pharmacist and our office with questions, and to notify us and stop treatment if problems arise, but know that our intention is that the benefits outweigh those potential risks and we strive to make you healthier and to improve your quality of life.    Referrals, and Radiology Information:    If your insurance requires a referral to a specialist, please allow 5 business days to  process your referral request.    If Pablito Knight MD orders a CT or MRI, it may take up to 10 business days to receive approval from your insurance company. Once our office has called informing you that the insurance company approved your testing, please call Central Scheduling at [phone]  Please allow our office 5 business days to contact you regarding any testing results.    Refill policies:   Allow 3 business days for refills; controlled substances may take longer and must be picked up from the office in person.  Narcotic medications can only be filled in 30 day increments and must be refilled at an office visit only.  If your prescription is due for a refill, you may be due for a follow-up appointment.  We cannot refill your maintenance medications at a preventative wellness visit.  To best provide you care, patients receiving maintenance medications need to be seen at least twice a year.

## 2025-03-31 NOTE — PROGRESS NOTES
Subjective:   Patient ID: Arron Gutierrez is a 49 year old male.    HPI  Mr. 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  He has been follow-up to his multiple specialists which include neurology, rheumatology, endocrinology, sports medicine, pain medicine.  He continues to have weakness and spasms all over his body.  He has had moments in which he almost fell.  His neurologist recently increased his duloxetine.  He had forgotten taking Celebrex and has had muscle aches when he was off of this.  He will have repeat DEXA scan in the next few weeks.    I had reviewed past medical and family histories together with allergy and medication lists documented.    History/Other:   Review of Systems   Constitutional:  Negative for fatigue, fever and unexpected weight change.   HENT:  Negative for trouble swallowing.    Respiratory:  Negative for cough and shortness of breath.    Gastrointestinal:  Negative for abdominal pain, nausea and vomiting.   Musculoskeletal:  Positive for arthralgias and back pain.   Neurological:  Positive for weakness.     Current Outpatient Medications   Medication Sig Dispense Refill    DULoxetine 30 MG Oral Cap DR Particles TAKE 1 CAPSULE EVERY DAY. TAKE WITH 60 MG CAPSULE FOR A TOTAL OF 90 MG.      traMADol 50 MG Oral Tab TAKE 1 TO 2 TABLETS DAILY AS NEEDED FOR BREAKTHROUGH PAIN. 20 tablet 1    celecoxib 200 MG Oral Cap       traZODone 50 MG Oral Tab Take 1 tablet (50 mg total) by mouth nightly. 90 tablet 0    Aluminum Chloride (DRYSOL) 20 % External Solution Apply 1 Application topically nightly. 75 mL 0    BD PEN NEEDLE MINI U/F 31G X 5 MM Does not apply Misc       famotidine 40 MG Oral Tab Take 1 tablet (40 mg total) by mouth nightly as needed.      DULoxetine 60 MG Oral Cap DR Particles Take 1 capsule (60 mg total) by mouth  daily. 90 capsule 2    gabapentin 400 MG Oral Cap Take 1 capsule (400 mg total) by mouth 2 (two) times daily. 60 capsule 5    tamsulosin 0.4 MG Oral Cap Take 1 capsule (0.4 mg total) by mouth every evening. 90 capsule 6    azelastine 0.1 % Nasal Solution 1 spray by Nasal route as needed.      levocetirizine 5 MG Oral Tab Take 1 tablet (5 mg total) by mouth nightly as needed.      glycopyrrolate 1 MG Oral Tab TAKE 1 TABLET BY MOUTH 3 TIMES DAILY. (Patient taking differently: Take 1 tablet (1 mg total) by mouth 2 (two) times daily.) 270 tablet 3    cephalexin 500 MG Oral Cap Take 1 capsule (500 mg total) by mouth 3 (three) times daily. 90 capsule 5    Teriparatide, Recombinant, (FORTEO) 600 MCG/2.4ML Subcutaneous Solution Pen-injector Inject 20 mcg into the skin daily.      cyclobenzaprine 5 MG Oral Tab Take 1 tablet (5 mg total) by mouth 3 (three) times daily as needed.      XIIDRA 5 % Ophthalmic Solution       baclofen 10 MG Oral Tab Take 1 tablet (10 mg total) by mouth 3 (three) times daily.       Allergies:Allergies[1]    Objective:   Physical Exam  Vitals reviewed.   Constitutional:       General: He is not in acute distress.  HENT:      Mouth/Throat:      Mouth: Mucous membranes are moist.      Pharynx: Oropharynx is clear.   Eyes:      General: No scleral icterus.     Conjunctiva/sclera: Conjunctivae normal.   Cardiovascular:      Rate and Rhythm: Normal rate and regular rhythm.      Heart sounds: Normal heart sounds. No murmur heard.  Pulmonary:      Effort: Pulmonary effort is normal. No respiratory distress.      Breath sounds: Normal breath sounds. No wheezing or rales.   Abdominal:      General: Bowel sounds are normal.      Palpations: Abdomen is soft.   Musculoskeletal:      Cervical back: Neck supple.   Lymphadenopathy:      Cervical: No cervical adenopathy.   Neurological:      Mental Status: He is alert.      Comments: No bilateral calf atrophy.   Psychiatric:         Mood and Affect: Mood normal.          Behavior: Behavior normal.         Thought Content: Thought content normal.         Assessment & Plan:   1. Neuropathy   -Continue with physical therapy and may include occupational therapy as previously recommended by his physical therapist  - Continue follow-up with his specialist including neurology  - We will follow their recommendations regarding management at this point   2. Other osteoporosis with current pathological fracture with routine healing, subsequent encounter   -Currently on Forteo  -Continue follow-up with specialist for management   3. At risk for falling   -Emphasized the importance of being mindful and being safe  - I do not him to fall as much as possible given the fact that he has neuropathy and osteoporosis at this point     Follow-up after 6 months or as needed    This note was prepared using [employer] voice recognition dictation software. As a result errors may occur. When identified these errors have been corrected. While every attempt is made to correct errors during dictation discrepancies may still exist          No orders of the defined types were placed in this encounter.    This note was prepared using [employer] voice recognition dictation software. As a result errors may occur. When identified these errors have been corrected. While every attempt is made to correct errors during dictation discrepancies may still exist          Meds This Visit:  Requested Prescriptions      No prescriptions requested or ordered in this encounter       Imaging & Referrals:  None         [1]   Allergies  Allergen Reactions    Carbamazepine FEVER, FACE FLUSHING and ITCHING

## 2025-04-07 RX ORDER — CELECOXIB 200 MG/1
200 CAPSULE ORAL 2 TIMES DAILY
Qty: 180 CAPSULE | Refills: 1 | Status: SHIPPED | OUTPATIENT
Start: 2025-04-07

## 2025-04-09 ENCOUNTER — OFFICE VISIT (OUTPATIENT)
Dept: PAIN CLINIC | Facility: CLINIC | Age: 50
End: 2025-04-09
Payer: COMMERCIAL

## 2025-04-09 VITALS — OXYGEN SATURATION: 98 % | SYSTOLIC BLOOD PRESSURE: 108 MMHG | DIASTOLIC BLOOD PRESSURE: 72 MMHG | HEART RATE: 77 BPM

## 2025-04-09 DIAGNOSIS — M47.816 LUMBAR SPONDYLOSIS: Primary | ICD-10-CM

## 2025-04-09 PROCEDURE — 99214 OFFICE O/P EST MOD 30 MIN: CPT | Performed by: ANESTHESIOLOGY

## 2025-04-09 NOTE — PATIENT INSTRUCTIONS
Refill policies:    Allow 2-3 business days for refills; controlled substances may take longer.  Contact your pharmacy at least 5 days prior to running out of medication and have them send an electronic request or submit request through the “request refill” option in your [employer] account.  Refills are not addressed on weekends; covering physicians do not authorize routine medications on weekends.  No narcotics or controlled substances are refilled after noon on Fridays or by on call physicians.  By law, narcotics must be electronically prescribed.  A 30 day supply with no refills is the maximum allowed.  If your prescription is due for a refill, you may be due for a follow up appointment.  To best provide you care, patients receiving routine medications need to be seen at least once a year.  Patients receiving narcotic/controlled substance medications need to be seen at least once every 3 months.  In the event that your preferred pharmacy does not have the requested medication in stock (e.g. Backordered), it is your responsibility to find another pharmacy that has the requested medication available.  We will gladly send a new prescription to that pharmacy at your request.    Scheduling Tests:    If your physician has ordered radiology tests such as MRI or CT scans, please contact Central Scheduling at [phone] right away to schedule the test.  Once scheduled, the [facility] Centralized Referral Team will work with your insurance carrier to obtain pre-certification or prior authorization.  Depending on your insurance carrier, approval may take 3-10 days.  It is highly recommended patients assure they have received an authorization before having a test performed.  If test is done without insurance authorization, patient may be responsible for the entire amount billed.      Precertification and Prior Authorizations:  If your physician has recommended that you have a procedure or additional testing performed the [facility]  Centralized Referral Team will contact your insurance carrier to obtain pre-certification or prior authorization.    You are strongly encouraged to contact your insurance carrier to verify that your procedure/test has been approved and is a COVERED benefit.  Although the Counts include 234 beds at the [facility] Centralized Referral Team does its due diligence, the insurance carrier gives the disclaimer that \"Although the procedure is authorized, this does not guarantee payment.\"    Ultimately the patient is responsible for payment.   Thank you for your understanding in this matter.  Paperwork Completion:  If you require FMLA or disability paperwork for your recovery, please make sure to either drop it off or have it faxed to our office at [phone]. Be sure the form has your name and date of birth on it.  The form will be faxed to our Forms Department and they will complete it for you.  There is a 25$ fee for all forms that need to be filled out.  Please be aware there is a 10-14 day turnaround time.  You will need to sign a release of information (JENNIFER) form if your paperwork does not come with one.  You may call the Forms Department with any questions at [phone].  Their fax number is [phone].

## 2025-04-09 NOTE — PROGRESS NOTES
Name: Arron Gutierrez   : 1975   DOS: 2025     Pain Clinic Follow Up Visit:     Chief Complaint   Patient presents with    Procedure Follow Up     BILATERAL LUMBAR  MEDIAL BRANCH BLOCK with local 3/17/25       Arron Gutierrez is a 49 year old male with a history of axial low back pain.  The patient is following up after completion of diagnostic medial branch block.  Reports 100% relief the day of the procedure.    Pt denies any chills, fever, or weakness. There is no bladder or bowel incontinence associated with the pain.    REVIEW OF SYSTEMS:  A ten point review of systems was performed with pertinent positives and negatives in the HPI.    Allergies[1]    Current Medications[2]      EXAM:   /72   Pulse 77   SpO2 98%   General:  Patient is a(n) 49 year old year old male in no acute distress.  Neurologic:: WNL-Orientation to time, place and person, normal mood & affect, concentration & attention span intact.   Inspection:  Ambulates with well-coordinated, fluid, non-antalgic gait.  Gait is normal.  Neck: Full range of motion  Cranial nerve: Grossly intact  Respiratory: Speech is nonlabored  Back: Gait intact injection site is clear    IMAGES:     Lumbar MRI reviewed with patient once again with evidence of grade 1 anterior listhesis L4 and L5.  There is facet arthropathy    ASSESSMENT AND PLAN:     1. Lumbar spondylosis      The patient is a pleasant 49-year-old gentleman with a history of low back pain.  This is 100% axial in nature.  Denies any radicular symptoms.  The patient is status post bilateral lumbar medial branch block with the 100% relief of back pain lasting for 12 hours.  Recommend moving forward with repeat medial branch block to facilitate radiofrequency ablation.    Current VAS Score: 8  Functional Deficits: Standing, walking, twisting  Duration of pain symptoms: Chronic  Specific Levels (Only 2 allowed): L4, L5  Initial treatment or subsequent? (Per area of the body):  Initial  Radiculopathy & neurogenic claudication ruled out?:  Yes  Pre-procedure assessment completed on: March 17, 2025 pain score 8 out of 10 VAS scale  Post-procedure assessment completed on: March 18, 2025 pain score 0 out of 10 VAS scale  % of pain relief from previous procedure/s: 100%  VAS Score during diagnostic phase: 0  Duration of relief from previous procedure/s: 12 hours  Prior Conservative Treatment: PT, home exercise program, NSAIDs, chiropractic care    Orders:  Orders Placed This Encounter   Procedures    [facility] PAIN NAVIGATOR         Radiology orders and consultations:None  The patient indicates understanding of these issues and agrees to the plan.  No follow-ups on file.    Semaj Avila MD, 4/9/2025, 10:27 AM              [1]   Allergies  Allergen Reactions    Carbamazepine FEVER, FACE FLUSHING and ITCHING   [2]   Current Outpatient Medications   Medication Sig Dispense Refill    celecoxib 200 MG Oral Cap Take 1 capsule (200 mg total) by mouth 2 (two) times daily. 180 capsule 1    DULoxetine 30 MG Oral Cap DR Particles TAKE 1 CAPSULE EVERY DAY. TAKE WITH 60 MG CAPSULE FOR A TOTAL OF 90 MG.      traMADol 50 MG Oral Tab TAKE 1 TO 2 TABLETS DAILY AS NEEDED FOR BREAKTHROUGH PAIN. 20 tablet 1    traZODone 50 MG Oral Tab Take 1 tablet (50 mg total) by mouth nightly. 90 tablet 0    Aluminum Chloride (DRYSOL) 20 % External Solution Apply 1 Application topically nightly. 75 mL 0    BD PEN NEEDLE MINI U/F 31G X 5 MM Does not apply Misc       famotidine 40 MG Oral Tab Take 1 tablet (40 mg total) by mouth nightly as needed.      DULoxetine 60 MG Oral Cap DR Particles Take 1 capsule (60 mg total) by mouth daily. 90 capsule 2    gabapentin 400 MG Oral Cap Take 1 capsule (400 mg total) by mouth 2 (two) times daily. 60 capsule 5    tamsulosin 0.4 MG Oral Cap Take 1 capsule (0.4 mg total) by mouth every evening. 90 capsule 6    azelastine 0.1 % Nasal Solution 1 spray by Nasal route as needed.      levocetirizine 5  MG Oral Tab Take 1 tablet (5 mg total) by mouth nightly as needed.      glycopyrrolate 1 MG Oral Tab TAKE 1 TABLET BY MOUTH 3 TIMES DAILY. (Patient taking differently: Take 1 tablet (1 mg total) by mouth 2 (two) times daily.) 270 tablet 3    cephalexin 500 MG Oral Cap Take 1 capsule (500 mg total) by mouth 3 (three) times daily. 90 capsule 5    Teriparatide, Recombinant, (FORTEO) 600 MCG/2.4ML Subcutaneous Solution Pen-injector Inject 20 mcg into the skin daily.      cyclobenzaprine 5 MG Oral Tab Take 1 tablet (5 mg total) by mouth 3 (three) times daily as needed.      baclofen 10 MG Oral Tab Take 1 tablet (10 mg total) by mouth 3 (three) times daily.      XIIDRA 5 % Ophthalmic Solution  (Patient not taking: Reported on 4/9/2025)

## 2025-04-09 NOTE — PROGRESS NOTES
Last procedure:   BILATERAL LUMBAR  MEDIAL BRANCH BLOCK with local     Date: 3/17/25  Percentage of relief obtained: 100%  Duration of relief: 1 day, now at 50%    Current Pain Score: 3/10    Narcotic Contract Exp: NA

## 2025-04-11 ENCOUNTER — TELEPHONE (OUTPATIENT)
Dept: PAIN CLINIC | Facility: CLINIC | Age: 50
End: 2025-04-11

## 2025-04-11 DIAGNOSIS — M47.816 LUMBAR SPONDYLOSIS: Primary | ICD-10-CM

## 2025-04-11 NOTE — TELEPHONE ENCOUNTER
Patient advised of insurance approval to proceed with injections and is agreeable to scheduling.  pre-procedure instructions reviewed. Patient prefers Local sedation. Reviewed sedation instructions including No Fasting & No  Required. Patient encouraged but not required to hold Celecoxib for 24 hours prior to procedure. Patient verbalized understanding of instructions, no further needs at this time.       [facility] PAIN CLINIC  PRE-PROCEDURE INSTRUCTIONS WITHOUT SEDATION    Procedure: Bilateral L4,L5 MBB       Appointment Date: 04/29/2025  Check-In Time: 09:45 AM    Follow-Up Date/Time: 05/12/2025 @ 09:00 AM    Prior to the procedure:  Please update us prior to the procedure if you are experiencing any symptoms of infection such as cough, fever, chills, urinary symptoms, or have recently been prescribed antibiotics, have open wounds, have recently had surgery or dental procedures.    Day of Procedure:  **Drivers will be required for patients who receive prescriptions for Valium.    NO FASTING REQUIRED  Please bring your Insurance Card, Photo ID, List of Current Medications and Referral (if applicable) to your appointment.  Please park in the [facility] and follow the signs to the [facility].  Check in at [facility] ([address] [place] [address]) outpatient registration in the [facility].  Please note-No prescriptions will be written by Pain Clinic in OR on the day of procedure. If you require a refill of medications, please contact the office 48 hours prior to your procedure.  If you have an implanted Spinal Cord or Peripheral Nerve Stimulator: Please remember to turn device off for procedure.        Medication Hold:    Number of days you need to be off for the following medications:    Aggrenox 10 days   Agrylin (Anagrelide) 10 days  Brilinta (Ticagrelor) 7 days  Imbruvica (Ibrutinib) 3 days   Enbrel (Etanercept) 24 hours   Fragmin (Dalteparin) 24 hours   Pletal (Cilostazol) 7  days  Effient (Prasugrel) 7 days  Pradaxa 10 days  Trental 7 days  Eliquis (Apixaban) 3 days  Xarelto (Rivaroxaban) 3 days  Lovenox (Enoxaparin) 24 hours  Aspirin  Greater than 81mg but less than 325mg   5 days  325mg and greater                  7 days  Coumadin       5 days  Procedure may be cancelled if INR is elevated.   Excedrin (with aspirin) 7 days  Plavix (Clopidogrel)                            7 days    NSAIDs: 24 hours preferred      Ibuprofen (Motrin, Advil, Vicoprofen), Naproxen (Naprosyn, Aleve), Piroxcam (Feldene), Meloxicam (Mobic), Oxaprozin (Daypro), Diclofenac (Voltaren), Indomethacin (Indocin), Etodolac (Lodine), Nabumetone (Relafen), Celebrex (Celecoxib)           HERBAL SUPPLEMENTS  5 days preferred  Fish oil, krill oil, Omega-3, Vascepa, Vitamin E, Turmeric, Garlic                       Insurance Authorization:   Most insurances are now requiring a preauthorization for all procedures.  In the event that your insurance does not authorize your procedure within 48 hours of the scheduled date, your procedure will be cancelled and rescheduled to a later date.  Please contact your insurance carrier to determine what your financial responsibility will be for the procedure(s).      Cancellation/Rescheduling Appointment:   In the event you need to cancel or reschedule your appointment, you must notify the office 24 hours prior.    Post-procedure instructions:        Please schedule a follow up visit within 2 to 4 weeks after your last procedure date   Please call our office with any questions or concerns before or after your procedure at  [phone].  If you are a diabetic, please increase the frequency of your glucose monitoring after the procedure as this may cause a temporary increase in your blood sugar.  Contact your primary care physician if your blood sugar rises as you may require some medication adjustment.  It is normal to have increased pain at injection site for up to 3-5 days after  procedure, you can use heat or ice (20 minutes on 20 minutes off) for comfort.    **To hear a recorded version of these instructions, please call [phone] and follow the prompts.  **Para escuchar las instrucciones en Español, por favor de llamar el keshia [phone] opción 4.

## 2025-04-11 NOTE — TELEPHONE ENCOUNTER
Prior authorization request completed for: bilateral L4/5,L5/S1 MBB   Authorization # [id number]  Authorization dates: 4/11/25-10/8/25  CPT codes approved: 06772,19972  Number of visits/dates of service approved: 1  Physician: rc  Location: [facility]     Patient can be scheduled. Routed to Navigator.

## 2025-04-15 ENCOUNTER — OFFICE VISIT (OUTPATIENT)
Facility: CLINIC | Age: 50
End: 2025-04-15
Payer: COMMERCIAL

## 2025-04-15 VITALS — HEIGHT: 74 IN | WEIGHT: 186 LBS | BODY MASS INDEX: 23.87 KG/M2

## 2025-04-15 DIAGNOSIS — G70.9 NEUROMUSCULAR DISEASE (HCC): ICD-10-CM

## 2025-04-15 DIAGNOSIS — M79.10 MYALGIA: ICD-10-CM

## 2025-04-15 DIAGNOSIS — Z98.890 S/P HIP ARTHROSCOPY: ICD-10-CM

## 2025-04-15 DIAGNOSIS — S76.211A INGUINAL STRAIN, RIGHT, INITIAL ENCOUNTER: Primary | ICD-10-CM

## 2025-04-15 DIAGNOSIS — S76.212A INGUINAL STRAIN, LEFT, INITIAL ENCOUNTER: ICD-10-CM

## 2025-04-15 PROCEDURE — 99214 OFFICE O/P EST MOD 30 MIN: CPT | Performed by: FAMILY MEDICINE

## 2025-04-15 NOTE — H&P
Sports Medicine Clinic Note    Subjective:    Chief Complaint: Groin and proximal hamstring tightness with spasms, right greater than left    History: 49-year-old male well known to me with a complex musculoskeletal history presents for ongoing evaluation of chronic persistent right greater than left groin and proximal hamstring pain described as tightness and spasms. Symptoms have been present for over two years and are exacerbated by activities such as reaching or pulling. Despite extensive conservative management--including physical therapy, dry needling, and soft tissue massage--he continues to experience significant tightness and recurrent spasms. He has a history of bilateral hip surgery and reports increased muscle stiffness since hospitalization. He likens the sensation in the hamstrings to “guitar strings.” Recent evaluation by endocrinology showed a 10% increase in bone density per DEXA, though osteopenia persists. There is concern that current symptoms may relate to prior phenytoin toxicity affecting the central and peripheral nervous system.    Objective:    Bilateral Groin/Hip Examination:    Inspection: No visible swelling, erythema, or deformity.  Palpation: Tenderness along the proximal adductors and medial hamstring origins bilaterally, right greater than left.  Range of Motion: Hip flexion and internal rotation elicit tightness and discomfort. Passive range is preserved but limited by guarding.  Neurovascular: Sensation intact bilaterally. Distal pulses are palpable with normal capillary refill.  Special Tests: Positive JHONATAN and FADIR bilaterally. Resisted adduction and passive straight leg raise reproduce deep groin discomfort and hamstring tightness.    Diagnostic Tests:    No new testing.    Assessment:    Chronic bilateral groin and proximal hamstring myofascial pain and spasm  History of bilateral hip surgery and pubic stress injury with persistent musculoskeletal stiffness  Neuromuscular  complications of toxic/metabolic etiology  Chronic musculoskeletal pain with spasticity secondary to neurologic dysfunction    Plan:    Additional Workup: None at this time; neurologic evaluation ongoing at [facility]  Therapy: Continue physical therapy with emphasis on hip adductor and hamstring flexibility, core stability, and spasm modulation  Medications: Continue NSAIDs or acetaminophen as needed for symptom relief  Bracing/Casting: None indicated  Procedures: Discussed non-steroidal options such as trigger point injections with lidocaine; PRP discussed but deferred for now due to cost and uncertain benefit in diffuse myofascial pain  Activity Recommendations: Avoid provocative movements involving forceful hip flexion or adduction. Continue low-impact, non-irritating activity as tolerated. Emphasize gentle mobility work and consistent home stretching    Follow-Up: Tentatively scheduled following completion of neurology consultation to reassess overall spasticity management and consider interventional treatment if appropriate.      Jorge Toussaint DO, CAQSM   Primary Care Sports Medicine

## 2025-04-21 ENCOUNTER — PATIENT MESSAGE (OUTPATIENT)
Facility: CLINIC | Age: 50
End: 2025-04-21

## 2025-04-21 NOTE — TELEPHONE ENCOUNTER
LOV 04/15/2025     Please see notes and advise on Mri request?    Not in notes ~    Additional Workup: None at this time; neurologic evaluation ongoing at [facility]  Therapy: Continue physical therapy with emphasis on hip adductor and hamstring flexibility, core stability, and spasm modulation  Medications: Continue NSAIDs or acetaminophen as needed for symptom relief  Bracing/Casting: None indicated  Procedures: Discussed non-steroidal options such as trigger point injections with lidocaine; PRP discussed but deferred for now due to cost and uncertain benefit in diffuse myofascial pain  Activity Recommendations: Avoid provocative movements involving forceful hip flexion or adduction. Continue low-impact, non-irritating activity as tolerated. Emphasize gentle mobility work and consistent home stretching

## 2025-04-22 ENCOUNTER — PATIENT MESSAGE (OUTPATIENT)
Dept: FAMILY MEDICINE CLINIC | Facility: CLINIC | Age: 50
End: 2025-04-22

## 2025-04-22 ENCOUNTER — TELEPHONE (OUTPATIENT)
Dept: PHYSICAL THERAPY | Facility: HOSPITAL | Age: 50
End: 2025-04-22

## 2025-04-22 DIAGNOSIS — L98.9 SKIN LESION: Primary | ICD-10-CM

## 2025-04-22 NOTE — TELEPHONE ENCOUNTER
Recommend evaluation by dermatology for skin lesions.  I am not sure if this is related to phenytoin.

## 2025-04-23 ENCOUNTER — OFFICE VISIT (OUTPATIENT)
Dept: PHYSICAL THERAPY | Age: 50
End: 2025-04-23
Attending: FAMILY MEDICINE
Payer: COMMERCIAL

## 2025-04-23 DIAGNOSIS — S76.211A INGUINAL STRAIN, RIGHT, INITIAL ENCOUNTER: Primary | ICD-10-CM

## 2025-04-23 PROCEDURE — 97161 PT EVAL LOW COMPLEX 20 MIN: CPT

## 2025-04-23 NOTE — TELEPHONE ENCOUNTER
LOV 4/15/25    Pt complaint of continued groin pain. Asking about MRI.  Re injured left shoulder moving hose. Asking about steroid injection.    States gabapentin was increased to 400mg three times daily, by [facility] provider/neurology    EMG in 2 weeks.  PT started today 4/23/25    Asking if want MRI ordered.     Per LOV: Follow-Up: Tentatively scheduled following completion of neurology consultation to reassess overall spasticity management and consider interventional treatment if appropriate.     Has upcoming visit with Dr Toussaint.  To follow up in office.    Future Appointments   Date Time Provider Department Center   4/30/2025  1:40 PM Jorge Toussaint,  EEMG ORTHOPL [facility] [address]   5/1/2025 11:00 AM Carlos Toledo MD [id number] EC [address]   5/2/2025 11:45 AM Giuliana Ortiz, PTA PF PT [place]   5/6/2025  1:15 PM Giuliana Ortiz, PTA PF PT [place]   5/23/2025 10:15 AM Giuliana Ortiz, PTA PF PT [place]   5/23/2025 12:00 PM Daniel Urbina DPM [id number] EC PLFD   5/27/2025 12:30 PM Giuliana Ortiz, PTA PF PT [place]   6/3/2025 12:30 PM Giuliana Ortiz, PTA PF PT [place]   6/10/2025 10:30 AM Christie Valencia APRN SGIPL ECC SUB GI   6/11/2025 12:30 PM Claudia Lunsford, PT PF PT [place]   6/17/2025 12:30 PM Stacy Wynn, PT PF PT [place]   10/1/2025 10:00 AM Pablito Knight MD EMG [address] EM[facility] [address]   11/21/2025  8:00 AM Marvin Catalan MD [facility] DERM ECC [facility]

## 2025-04-23 NOTE — PROGRESS NOTES
NEUROLOGICAL EVALUATION:     Diagnosis:   Inguinal strain, right, initial encounter (S76.211A) Patient:  Arron Jared Gutierrez (49 year old, male)        Referring Provider: Jorge Toussaint  Today's Date   4/23/2025    Precautions:  Fall Risk (R shoulder, lumbar, cervical, hip pain) Date of Evaluation: 04/23/25       PATIENT SUMMARY   Summary of chief complaints: bilateral LE spasming with pain; pain also in R shoulder, lumbar, knees, hips  History of current condition: Pt reports spasms and pain at bilateral adductors, proximal hamstrings, quads that occurs with movement/bending/reaching; recently transitioned to this clinic from another clinic   Pain level: current -- (pain not rated), at best  , at worst    Description of symptoms: sharp pain with spasming in adductors, hamstring, quad, primarily   Occupation: independent , works from home   Leisure activities/Hobbies: walks 3-4 miles, up to 3-4x/day, uses walking sticks, has trialed running recently   Prior level of function: modified independent  Current limitations: performing stairs due to pain mark with descending, driving and walking tolerance due to spasms/pain  Pt goals: manage tightness/spasms and hopes to return to tennis; 'be as active and independent as I can'  History of falls: Yes he trips easily; got rid of carpet at home which helps; sometimes trips on sidewalk   Home Environment: 2story with basement; lives with spouse   ADLs: arrives without device; states he does have walking stick and walker at home     Past medical history was reviewed with Arron.  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 Pt very ill b/t [id number]. 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 L4-5 disc involvement, pending 2nd nerve block and then doing ablation.    Imaging/Tests: MRI  8/24  IMPRESSION:   1.L4-5: Slight progress narrowing of the right greater than left lateral recess when compared to prior. Stable mild to moderate central canal stenosis, moderate left and minimal right neural foraminal narrowing.   2.Remainder the lumbar spine demonstrate degenerative changes with no significant central canal stenosis or neural foraminal narrowing.     Arron  has a past medical history of Anemia (4/21), Back pain (12/21), Bleeding nose (1/2000), Bloating (6/21), Blood in the stool (5/21), Blurred vision (9/20), Cerebellar atrophy (10/2022), Constipation (12/20), COVID-19, Depression (3/23), Diarrhea, unspecified (9/22), Dilantin toxicity, Dizziness (9/20), Dyssynergia, Fatigue (10/22), Headache disorder (10/22), Hemorrhoids (10/22), Irregular bowel habits (10/20), Myomalacia (10/2022), Night sweats (12/22), Pain in joints (12/21), Pain with bowel movements (3/21), Rash (4/21), Seizure disorder (HCC) (1989), Sleep disturbance (1/22), Stool incontinence (5/21), Uncomfortable fullness after meals (6/21), Visual impairment, Wears glasses (9/20), and Weight loss (12/20).  He  has a past surgical history that includes other surgical history ('76); other surgical history (2011); other; and colonoscopy (10/2022).    ASSESSMENT  Arron presents to physical therapy evaluation with primary c/o bilateral LE spasming with pain; pain also in R shoulder, lumbar, knees, hips. The results of the objective tests and measures show mod-significant decreased muscle length/flexibility bilateral LE's, impaired balance and gait, decreased pt ed for HEP. Functional deficits include but are not limited to performing stairs due to pain mark with descending, driving and walking tolerance due to spasms/pain. Signs and symptoms are consistent with diagnosis of Inguinal strain, right, initial encounter (S76.211A). Pt and PT discussed evaluation findings, pathology, POC and HEP.  Pt voiced understanding and performs HEP correctly  without reported pain. Skilled Physical Therapy is medically necessary to address the above impairments and reach functional goals.    OBJECTIVE:    Musculoskeletal:  Observation/Posture:    Equal LE Wb'ing, decreased lumbar lordosis, decreased gluteal mass, mild genu recurvatum    Palpation:  to be assessed next visit    ROM and Strength:  (* denotes performed with pain)  Trunk ROM     Flex to distal tibia, hamstring tightness     Ext limited, stiff    R L     Side bend limited 25% limited 25%     Rotation       ,   Hip   ROM MMT (-/5)    R L R L     Flex (L2) WFL WFL 4+ 4+     Ext      4+ 4+     Abd WFL WFL 5 5     ER 45+ 45+         IR ~25 ~25        ,   Knee   ROM MMT (-/5)    R L R L     Flex  WNL WNL 5 5     Ext (L3)  0 0 5 5     ,   Ankle/Foot   ROM    R L     PF NT NT     DF (L4) 5 5     Inversion         Eversion         Grt Toe Ext (L5)           Flexibility:  LE Flexibility R L     Hip Flexor mod restricted mod restricted     Hamstrings significantly restricted  (-45 in 90/90) significantly restricted  (-30 in 90/90)     ITB mod restricted mod restricted     Piriformis         Quads mod restricted mod restricted     Gastroc-soleus           Neurological:  Coordination:  Finger to Nose: NT   Pronation/Supination: NT   Toe Tap:       Sensation: impaired   tingling bilateral thighs, additional areas per pt      Deep Tendon Reflexes: WNL except     Tone: WNL except -- (no increased tone noted with PROM LE's; will further assess next visit)             Balance and Functional Mobility:  Mobility / Transfers Level of Assistance   Bed Mobility IND   Supine --> Sit IND   Sit --> Supine IND   Sit --> Stand IND   Stand --> Sit IND   Chair --> Chair       Postural Control:  Romberg EO: level surface 20+  ; compliant surface       Tandem Stance: R back:  ; L back:  ; Fall Risk:    SLS: R: 20 sec; L: 20 sec; Fall Risk:      Gait: pt ambulates on level ground with other (use comment) (ataxic pattern)  Further functional  assessments to be performed at later visit (TUG,      Today's Treatment and Response:   Pt education was provided on exam findings, treatment diagnosis, treatment plan, expectations, and prognosis.        Charges:  PT EVAL:  ,    In agreement with evaluation findings and clinical rationale, this evaluation involved LOW COMPLEXITY decision making due to no personal factors/comorbidities, 1-2 body structures involved/activity limitations, and stable symptoms as documented in the evaluation.                                                        PLAN OF CARE:    Goals: (to be met in 8 visits)   Pt to be independent in HEP addressing LE stretching and strength ex's to improve flexibility and stability for functional activity.  Pt to show improved Hamstring flexibility by 10 degrees on right LE for improved muscle length in mobility.  Pt to show improved quadricep flexibility to 100 degrees knee flexion or better in prone.  Pt to report decreased frequency of spasming/pain of LE's by 25% for improved tolerance to turning/reaching/mobility.  Pt to display improved glut max strength to 4+/5 for improved stability in gait and decreased risk for fall.       Frequency / Duration: Patient will be seen 1-2x/week or a total of 8 visits over a 90 day period. Treatment will include: Manual Therapy; Neuromuscular Re-education; Home Exercise Program instruction; Patient/Family Education; Therapeutic Exercise; Gait training    Education or treatment limitation: None   Rehab Potential: good     LEFS Score  LEFS Score: (Patient-Rptd) 47.5 % (4/21/2025  6:27 PM)      Patient/Family/Caregiver was advised of these findings, precautions, and treatment options and has agreed to actively participate in planning and for this course of care.    Thank you for your referral. Please co-sign or sign and return this letter via fax as soon as possible to [phone]. If you have any questions, please contact me at Dept:  [phone]    Sincerely,  Electronically signed by therapist: Claudia Lunsford PT  Physician's certification required: Yes  I certify the need for these services furnished under this plan of treatment and while under my care.    X___________________________________________________ Date____________________    Certification From: 4/23/2025  To:7/22/2025

## 2025-04-23 NOTE — TELEPHONE ENCOUNTER
Yes would have him come in for the left shoulder shot it's been enough time. Can discuss MRI then

## 2025-04-28 ENCOUNTER — OFFICE VISIT (OUTPATIENT)
Dept: PHYSICAL THERAPY | Age: 50
End: 2025-04-28
Attending: FAMILY MEDICINE
Payer: COMMERCIAL

## 2025-04-28 PROCEDURE — 97140 MANUAL THERAPY 1/> REGIONS: CPT

## 2025-04-28 NOTE — PROGRESS NOTES
Patient: Arron Gutierrez (49 year old, male) Referring Provider:  Insurance:   Diagnosis: Inguinal strain, right, initial encounter (S76.807A) Jorge CANO   Date of Surgery: No data recorded Next MD visit:  N/A   Precautions:  Fall Risk (R shoulder, lumbar, cervical, hip pain) No data recorded Referral Information:    Date of Evaluation: Req: 1, Auth: 1, Exp: 4/15/2026    04/23/25 POC Auth Visits:  8       Today's Date   4/28/2025    Subjective  \" My legs feel very stiff and I do stretching ex's daily at home but I do not feel any decrease in mm tightness .       Pain: 0/10     Objective               Assessment  Initiated maunal stretches for B LE this visit with patient good tolernace .    Goals (to be met in 8 visits :  Pt to be independent in HEP addressing LE stretching and strength ex's to improve flexibility and stability for functional activity.  Pt to show improved Hamstring flexibility by 10 degrees on right LE for improved muscle length in mobility.  Pt to show improved quadricep flexibility to 100 degrees knee flexion or better in prone.  Pt to report decreased frequency of spasming/pain of LE's by 25% for improved tolerance to turning/reaching/mobility.  Pt to display improved glut max strength to 4+/5 for improved stability in gait and decreased risk for fall.        Plan  Continue PT as per current POC .    Treatment Last 4 Visits  Treatment Day: 2       4/28/2025   Neuro Treatment   Therapeutic Exercise NU step x 6 min , level 5      Manual Therapy Supine ;  STM / Friction massage to B hip flexors   STM / FR along B hip adductors   Manual stretches :  R/L hip groin stretch 10 x 10 sec hold ea   R/L hip adductor stretch 10 x 10 sec hold   R/L piriformis ( ER/IR ) 10 x 10 sec hold   Side lying :  R/L hip flexor / quad stretch 10 x 10 sec hold   Prone :  R/L quad /hip flexor stretch 10 x 10 sec hold   Standing :  R/L gastroc / soleus stretch on slant board 10 x 10 sec hold    Therapeutic  Exercise Minutes 45   Total Time Of Timed Procedures 45   Total Time Of Service-Based Procedures 0   Total Treatment Time 45        HEP       Charges  manual therapy x 3

## 2025-04-29 ENCOUNTER — HOSPITAL ENCOUNTER (OUTPATIENT)
Facility: HOSPITAL | Age: 50
Setting detail: HOSPITAL OUTPATIENT SURGERY
Discharge: HOME | End: 2025-04-29
Attending: ANESTHESIOLOGY | Admitting: ANESTHESIOLOGY
Payer: COMMERCIAL

## 2025-04-29 ENCOUNTER — APPOINTMENT (OUTPATIENT)
Dept: GENERAL RADIOLOGY | Facility: HOSPITAL | Age: 50
End: 2025-04-29
Attending: ANESTHESIOLOGY
Payer: COMMERCIAL

## 2025-04-29 VITALS
TEMPERATURE: 99 F | DIASTOLIC BLOOD PRESSURE: 72 MMHG | HEIGHT: 74 IN | RESPIRATION RATE: 18 BRPM | WEIGHT: 156 LBS | BODY MASS INDEX: 20.02 KG/M2 | SYSTOLIC BLOOD PRESSURE: 117 MMHG | HEART RATE: 70 BPM | OXYGEN SATURATION: 94 %

## 2025-04-29 PROCEDURE — 64494 INJ PARAVERT F JNT L/S 2 LEV: CPT | Performed by: ANESTHESIOLOGY

## 2025-04-29 PROCEDURE — 64493 INJ PARAVERT F JNT L/S 1 LEV: CPT | Performed by: ANESTHESIOLOGY

## 2025-04-29 RX ORDER — LIDOCAINE HYDROCHLORIDE 10 MG/ML
INJECTION, SOLUTION EPIDURAL; INFILTRATION; INTRACAUDAL; PERINEURAL
Status: DISCONTINUED | OUTPATIENT
Start: 2025-04-29 | End: 2025-04-29

## 2025-04-29 RX ORDER — NALOXONE HYDROCHLORIDE 0.4 MG/ML
0.08 INJECTION, SOLUTION INTRAMUSCULAR; INTRAVENOUS; SUBCUTANEOUS AS NEEDED
Status: DISCONTINUED | OUTPATIENT
Start: 2025-04-29 | End: 2025-04-29

## 2025-04-29 RX ORDER — BUPIVACAINE HYDROCHLORIDE 5 MG/ML
INJECTION, SOLUTION EPIDURAL; INTRACAUDAL; PERINEURAL
Status: DISCONTINUED | OUTPATIENT
Start: 2025-04-29 | End: 2025-04-29

## 2025-04-29 RX ORDER — METHYLPREDNISOLONE ACETATE 40 MG/ML
INJECTION, SUSPENSION INTRA-ARTICULAR; INTRALESIONAL; INTRAMUSCULAR; SOFT TISSUE
Status: DISCONTINUED | OUTPATIENT
Start: 2025-04-29 | End: 2025-04-29

## 2025-04-29 NOTE — DISCHARGE INSTRUCTIONS
Home Care Instructions Following Your Pain Procedure     Arron,  It has been a pleasure to have you as our patient. To help you at home, you must follow these general discharge instructions. We will review these with you before you are discharged. It is our hope that you have a complete and uneventful recovery from our procedure.     General Instructions:  What to Expect:  Bandages from your procedure today can be removed when you get home.  Please avoid soaking and/or swimming for 24 hours.  Showering is okay  It is normal to have increased pain symptoms and/or pain at injection site for up to 3-5 days after procedure, you can use heat or ice (20 minutes on 20 minutes off) for comfort.  You may experience some temporary side effects which may include restlessness or insomnia, flushing of the face, or heart palpitations.  Please contact the provider if these symptoms do not resolve within 3-4 days.  Lightheadedness or nausea may occur and should resolve within 24 to 48 hours.  If you develop a headache after treatment, rest, drink fluids (with caffeine, if possible) and take mild over-the-counter pain medication.  If the headache does not improve with the above treatment, contact the physician.  Home Medications:  Resume all previously prescribed medication.  Please avoid taking NSAIDs (Non-Steriodal Anti-Inflammatory Drugs) such as:  Ibuprofen ( Advil, Motrin) Aleve (Naproxen), Diclofenac, Meloxicam for 6 hours after procedure.   If you are on Coumadin (Warfarin) or any other anti-coagulant (or \"blood thinning\") medication such as Plavix (Clopidogrel), Xarelto (Rivaroxaban), Eliquis (Apixaban), Effient (Prasugrel) etc., restart on the following day from the procedure unless otherwise directed by your provider.  If you are a diabetic, please increase the frequency of your glucose monitoring after the procedure as steroids may cause a temporary (2-3 day) increase in your blood sugar.  Contact your primary care  physician if your blood sugar remains elevated as you may require some medication adjustment.  Diet:  Resume your regular diet as tolerated.  Activity:  We recommend that you relax and rest during the rest of your procedure day.  If you feel weakness in your arms or legs do not drive.  Follow-up Appointment  Please schedule a follow-up visit within 3 to 4 weeks after your last procedure date.  Question or Concerns:  Feel free to call our office with any questions or concerns at [phone] (option #2)    Arron  Thank you for coming to [facility] for your procedure.  The nurses try very hard to make sure you receive the best care possible.  Your trust in them as well as us is greatly appreciated.    Thanks so much,   Dr. Semaj Avila
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
## 2025-04-29 NOTE — OPERATIVE REPORT
[facility]  Operative Report  2025     Arron Gutierrez Patient Status:  Hospital Outpatient Surgery    1975 MRN [id number]   Location [facility] Attending Semaj Avila MD   Hosp Day # 0 PCP Pablito Knight MD     Indication: Arron is a 49 year old male with lumbar spondylosis    Preoperative Diagnosis:  Lumbar spondylosis [M47.816]    Postoperative Diagnosis: Same as above.    Procedure performed: BILATERAL LUMBAR  MEDIAL BRANCH BLOCK with local (L4-5, L5-S1)    Anesthesia: Local .    EBL: Less than 1 ml.    Procedure Description:  After reviewing the patient's history and performing a focused physical examination, the diagnosis was confirmed and contraindications such as infection and coagulopathy were ruled out.  Following review of allergy,  potential side effects and complications, including but not necessarily limited to infection, allergic reaction, local tissue breakdown, nerve injury, and paresis, the patient indicated they understood and agreed to proceed.  After obtaining the informed consent, the patient was brought to the procedure room and monitored.      The patient was placed prone on the table and the back was prepped and draped in a sterile fashion.  Attention was turned towards the patient's right side first.  The skin and subcutaneous tissue was anesthetized via 25-gauge 1.5\" needle with approximately 2 mL of 1% lidocaine.  Under fluoroscopy guidance, a 25-gauge 3.5\" Quincke spinal needle was introduced and advanced along the superior margin of the L3, L4, L5 transverse process and lateral to the L3, L4, L5 superior articular process, and it was directed inferiorly and medially so that the tip struck the superior aspect of the junction of the transverse process and the supe¬rior articular process.  Following negative aspiration for CSF and blood, approximately 1 mL of 1% Lidocaine was injected via each needle without complication.  The similar  procedure was repeated at the contralateral side.  The needles were removed with tips intact.  The patient tolerated procedure very well.  No complications were observed during or immediately after the procedure.  The patient was observed until discharge criteria met.  Discharge instructions were given and patient was released to a responsible adult.    Complications: None.    Follow up:  The patient was followed in the pain clinic as needed basis.    Semaj Avila MD

## 2025-04-30 ENCOUNTER — PATIENT MESSAGE (OUTPATIENT)
Dept: PAIN CLINIC | Facility: CLINIC | Age: 50
End: 2025-04-30

## 2025-04-30 ENCOUNTER — OFFICE VISIT (OUTPATIENT)
Facility: CLINIC | Age: 50
End: 2025-04-30
Payer: COMMERCIAL

## 2025-04-30 ENCOUNTER — TELEPHONE (OUTPATIENT)
Dept: PAIN CLINIC | Facility: CLINIC | Age: 50
End: 2025-04-30

## 2025-04-30 VITALS — WEIGHT: 156 LBS | HEIGHT: 74 IN | BODY MASS INDEX: 20.02 KG/M2

## 2025-04-30 DIAGNOSIS — M19.012 ARTHROSIS OF LEFT ACROMIOCLAVICULAR JOINT: ICD-10-CM

## 2025-04-30 DIAGNOSIS — G89.29 CHRONIC MUSCULOSKELETAL PAIN: Primary | ICD-10-CM

## 2025-04-30 DIAGNOSIS — M75.102 ROTATOR CUFF SYNDROME OF LEFT SHOULDER: ICD-10-CM

## 2025-04-30 DIAGNOSIS — M79.18 CHRONIC MUSCULOSKELETAL PAIN: Primary | ICD-10-CM

## 2025-04-30 DIAGNOSIS — S43.432D GLENOID LABRAL TEAR, LEFT, SUBSEQUENT ENCOUNTER: ICD-10-CM

## 2025-04-30 PROCEDURE — 20606 DRAIN/INJ JOINT/BURSA W/US: CPT | Performed by: FAMILY MEDICINE

## 2025-04-30 PROCEDURE — 99214 OFFICE O/P EST MOD 30 MIN: CPT | Performed by: FAMILY MEDICINE

## 2025-04-30 PROCEDURE — 20611 DRAIN/INJ JOINT/BURSA W/US: CPT | Performed by: FAMILY MEDICINE

## 2025-04-30 NOTE — PROGRESS NOTES
Sports Medicine Clinic Note    Subjective:    Chief Complaint: Left shoulder pain, chronic pelvic and groin pain, muscle spasms, and chronic pain requiring ongoing management    History: Patient presents with chronic left shoulder pain localized to the anterior shoulder and AC joint, aggravated by physical activity. Reports a history of labral tear and bursitis. Also has chronic shoulder arthritis contributing to joint discomfort. Additionally, patient describes chronic pelvic and groin pain likely due to hip flexor tendon involvement and muscle spasms radiating to the gluteal region. Muscle spasms also affect the legs and quads, with tightness unrelieved by stretching. Chronic pain management is ongoing with medications and injections coordinated by neurology and pain management specialists.    Objective:    Left Shoulder Examination:    Inspection: No visible swelling or deformity of the shoulder.  Palpation: Tenderness localized to the anterior glenohumeral joint and acromioclavicular joint.  Range of Motion: Full but painful arc during forward flexion and abduction beyond 90 degrees.  Neurovascular: No evidence of neuromuscular compromise.  Special Tests: Positive O’Gerber’s and Speed’s test. Mild discomfort with cross-body adduction.    Diagnostic Tests:    No new testing.    Previous radiographs and MRI of the left shoulder demonstrated no acute fracture or dislocation. Subtle posterior superior labral tear. Mild subacromial/subdeltoid bursitis. Degenerative changes at the acromioclavicular joint with capsular hypertrophy. Mild reactive cystic changes within the superolateral humeral head. No rotator cuff tear, but mild fluid in the subacromial/subdeltoid bursa. Questionable subtle edema in the teres minor muscle, possibly related to a strain or denervation.    Assessment:    Chronic left shoulder labral tear and subacromial bursitis  Left shoulder osteoarthritis with associated inflammation  Chronic pain  syndrome under current neurologic and pain specialist management    Plan:    Procedures: Corticosteroid injection administered today into the left acromioclavicular and glenohumeral joints under sterile technique.  Therapy: Continue with physical therapy focusing on pelvic stability, hip mobility, and neuromuscular relaxation for spasms.  Medications: Continue current medications for muscle spasms as previously prescribed.  Bracing/Casting: Not indicated at this time.  Activity Recommendations: Avoid heavy lifting and reduce use of high-resistance equipment. Use lighter resistance and low-impact activity to prevent symptom flare.    Follow-Up: 2-4 weeks.    - - -    Ultrasound Guided Procedure Note (Multiple Procedures):    Procedures: Therapeutic injections into the left glenohumeral and acromioclavicular joints under ultrasound guidance.    Pre-Procedure Evaluation: Discussed risks, benefits, and alternatives with the patient. The patient elected to proceed. Confirmed absence of prior adverse reactions, active infections, or relevant allergies. Skin assessment without erythema, warmth or other abnormalities noted.     Preparation and Sterilization: Skin sterilized with ChloraPrep at the intended injection sites.    Injections: The patient tolerated the procedures well with no immediate complications noted. A 22 gauge needle was used for both injections. A mixture of 1 mL of triamcinolone 40 mg/mL and 2 mL of 1% Lidocaine without Epinephrine, administered at the glenohumeral joint site. A mixture of 1 mL of kenalog 40 mg/mL and 1 mL of 1% Lidocaine without Epinephrine, administered at the acromioclavicular joint site.     Post-Procedure Instructions: The procedures were completed without complication; and occlusive bandages were applied post-injections. Advised the patient to avoid strenuous activity for 24-48 hours. Use ice or Tylenol as needed. Patient instructed to observe for signs of infection or allergic  reaction (e.g., fever, increased swelling, persistent pain) and to contact the clinic immediately if any such signs occur. Rest the treated area for 2-3 days before resuming regular activities. The area may be more painful for the first 1-2 days post-procedure.      Jorge Toussaint DO, [facility]   Primary Care Sports Medicine

## 2025-04-30 NOTE — TELEPHONE ENCOUNTER
[facility] called placed to patient for post procedure follow up. Patient stated no relief. Informed patient that soreness is to be expected after the procedure. Educated patient that it takes 3-5 days for the steroid to be effective and to allow adequate time for medication to work. Encouraged patient to alternate ice and heat and to take medications as prescribed. Pt verbalized understanding to call with any questions or concerns.      Procedure: BILATERAL LUMBAR  MEDIAL BRANCH BLOCK with local   Date: 04/29/25  Follow up Visit Scheduled:     no chest pain and no edema.

## 2025-05-01 ENCOUNTER — TELEPHONE (OUTPATIENT)
Facility: LOCATION | Age: 50
End: 2025-05-01

## 2025-05-01 ENCOUNTER — OFFICE VISIT (OUTPATIENT)
Dept: SURGERY | Facility: CLINIC | Age: 50
End: 2025-05-01
Payer: COMMERCIAL

## 2025-05-01 ENCOUNTER — PATIENT MESSAGE (OUTPATIENT)
Dept: FAMILY MEDICINE CLINIC | Facility: CLINIC | Age: 50
End: 2025-05-01

## 2025-05-01 DIAGNOSIS — N13.8 BPH WITH OBSTRUCTION/LOWER URINARY TRACT SYMPTOMS: Primary | ICD-10-CM

## 2025-05-01 DIAGNOSIS — R23.3 PETECHIAE: Primary | ICD-10-CM

## 2025-05-01 DIAGNOSIS — N40.1 BPH WITH OBSTRUCTION/LOWER URINARY TRACT SYMPTOMS: Primary | ICD-10-CM

## 2025-05-01 LAB
APPEARANCE: CLEAR
GLUCOSE (URINE DIPSTICK): NEGATIVE MG/DL
KETONES (URINE DIPSTICK): 15 MG/DL
LEUKOCYTES: NEGATIVE
MULTISTIX LOT#: ABNORMAL NUMERIC
NITRITE, URINE: NEGATIVE
OCCULT BLOOD: NEGATIVE
PH, URINE: 5.5 (ref 4.5–8)
PROTEIN (URINE DIPSTICK): NEGATIVE MG/DL
SPECIFIC GRAVITY: >=1.03 (ref 1–1.03)
URINE-COLOR: YELLOW
UROBILINOGEN,SEMI-QN: 0.2 MG/DL (ref 0–1.9)

## 2025-05-01 PROCEDURE — 81003 URINALYSIS AUTO W/O SCOPE: CPT | Performed by: SURGERY

## 2025-05-01 PROCEDURE — 99214 OFFICE O/P EST MOD 30 MIN: CPT | Performed by: SURGERY

## 2025-05-01 RX ORDER — TAMSULOSIN HYDROCHLORIDE 0.4 MG/1
0.4 CAPSULE ORAL EVERY EVENING
Qty: 90 CAPSULE | Refills: 6 | Status: SHIPPED | OUTPATIENT
Start: 2025-05-01

## 2025-05-02 ENCOUNTER — LAB ENCOUNTER (OUTPATIENT)
Dept: LAB | Age: 50
End: 2025-05-02
Attending: DERMATOLOGY
Payer: COMMERCIAL

## 2025-05-02 ENCOUNTER — OFFICE VISIT (OUTPATIENT)
Dept: PHYSICAL THERAPY | Age: 50
End: 2025-05-02
Attending: FAMILY MEDICINE
Payer: COMMERCIAL

## 2025-05-02 DIAGNOSIS — R23.3 PETECHIAE: ICD-10-CM

## 2025-05-02 LAB
BASOPHILS # BLD AUTO: 0.03 X10(3) UL (ref 0–0.2)
BASOPHILS NFR BLD AUTO: 0.3 %
EOSINOPHIL # BLD AUTO: 0.02 X10(3) UL (ref 0–0.7)
EOSINOPHIL NFR BLD AUTO: 0.2 %
ERYTHROCYTE [DISTWIDTH] IN BLOOD BY AUTOMATED COUNT: 13.5 %
HCT VFR BLD AUTO: 39.7 % (ref 39–53)
HGB BLD-MCNC: 13.4 G/DL (ref 13–17.5)
IMM GRANULOCYTES # BLD AUTO: 0.04 X10(3) UL (ref 0–1)
IMM GRANULOCYTES NFR BLD: 0.4 %
INR BLD: 0.94 (ref 0.8–1.2)
LYMPHOCYTES # BLD AUTO: 2.08 X10(3) UL (ref 1–4)
LYMPHOCYTES NFR BLD AUTO: 20.5 %
MCH RBC QN AUTO: 33.3 PG (ref 26–34)
MCHC RBC AUTO-ENTMCNC: 33.8 G/DL (ref 31–37)
MCV RBC AUTO: 98.5 FL (ref 80–100)
MONOCYTES # BLD AUTO: 1.07 X10(3) UL (ref 0.1–1)
MONOCYTES NFR BLD AUTO: 10.5 %
NEUTROPHILS # BLD AUTO: 6.91 X10 (3) UL (ref 1.5–7.7)
NEUTROPHILS # BLD AUTO: 6.91 X10(3) UL (ref 1.5–7.7)
NEUTROPHILS NFR BLD AUTO: 68.1 %
PLATELET # BLD AUTO: 264 10(3)UL (ref 150–450)
PROTHROMBIN TIME: 12.4 SECONDS (ref 11.6–14.8)
RBC # BLD AUTO: 4.03 X10(6)UL (ref 4.3–5.7)
WBC # BLD AUTO: 10.2 X10(3) UL (ref 4–11)

## 2025-05-02 PROCEDURE — 97140 MANUAL THERAPY 1/> REGIONS: CPT

## 2025-05-02 PROCEDURE — 85025 COMPLETE CBC W/AUTO DIFF WBC: CPT

## 2025-05-02 PROCEDURE — 36415 COLL VENOUS BLD VENIPUNCTURE: CPT | Performed by: DERMATOLOGY

## 2025-05-02 PROCEDURE — 85730 THROMBOPLASTIN TIME PARTIAL: CPT | Performed by: DERMATOLOGY

## 2025-05-02 PROCEDURE — 85610 PROTHROMBIN TIME: CPT

## 2025-05-02 NOTE — PROGRESS NOTES
Patient: Arron Gutierrez (49 year old, male) Referring Provider:  Insurance:   Diagnosis: Inguinal strain, right, initial encounter (S76.607A) Jorge CANO   Date of Surgery: No data recorded Next MD visit:  N/A   Precautions:  Fall Risk (R shoulder, lumbar, cervical, hip pain) No data recorded Referral Information:    Date of Evaluation: Req: 0, Auth: 0, Exp:     04/23/25 POC Auth Visits:  8       Today's Date   5/2/2025    Subjective  \" My legs felt less tight for few hrs after last session . Today my both legs , hips and lower back feels tight . I did work out this AM at my house and did some waking .\"       Pain: 0/10     Objective                 Assessment  Continued with manual hips stretching activites and incorporated  stretching ex's for lower back to imptove postuter and reduce pain and decrease tightness for improved ability to stand and sit with more ease . Patient was able to complete all given activites with good form .      Goals (to be met in 8 visits :  Pt to be independent in HEP addressing LE stretching and strength ex's to improve flexibility and stability for functional activity.  Pt to show improved Hamstring flexibility by 10 degrees on right LE for improved muscle length in mobility.  Pt to show improved quadricep flexibility to 100 degrees knee flexion or better in prone.  Pt to report decreased frequency of spasming/pain of LE's by 25% for improved tolerance to turning/reaching/mobility.  Pt to display improved glut max strength to 4+/5 for improved stability in gait and decreased risk for fall.           Plan  Continue PT as per current POC.    Treatment Last 4 Visits  Treatment Day: 3       5/2/2025   LE Treatment   Therapeutic Exercise NU step x 6 min , level 6      Manual Therapy Manual stretches :  Supine :  R/L HS 5 x 30 sec hold   R/L piriformis ( ER/IR ) 10 x 10 ea   R/L hip flexor 10 x 10 sec hold   R/L groin stretch 10 x 10 sec hold    Prone :  R/L hip flexor / quad  stretch 10 x 10 sec hold   Prayer stretch 10 x 10 sec hold   Seated :  Trunk flexion stretch 10 x 10 sec hold   Trunk flexion stretch to R/L 10 x 10 sec hold      Manual Therapy Minutes 45   Total Time Of Timed Procedures 45   Total Time Of Service-Based Procedures 0   Total Treatment Time 45        HEP       Charges     manual x 3

## 2025-05-03 ENCOUNTER — PATIENT MESSAGE (OUTPATIENT)
Dept: FAMILY MEDICINE CLINIC | Facility: CLINIC | Age: 50
End: 2025-05-03

## 2025-05-03 DIAGNOSIS — R79.9 ELEVATED BUN: Primary | ICD-10-CM

## 2025-05-04 RX ORDER — CELECOXIB 200 MG/1
200 CAPSULE ORAL 2 TIMES DAILY
Qty: 180 CAPSULE | Refills: 1 | Status: SHIPPED | OUTPATIENT
Start: 2025-05-04

## 2025-05-04 RX ORDER — TRIAMCINOLONE ACETONIDE 40 MG/ML
40 INJECTION, SUSPENSION INTRA-ARTICULAR; INTRAMUSCULAR ONCE
Status: COMPLETED | OUTPATIENT
Start: 2025-05-04 | End: 2025-05-05

## 2025-05-05 RX ADMIN — TRIAMCINOLONE ACETONIDE 40 MG: 40 INJECTION, SUSPENSION INTRA-ARTICULAR; INTRAMUSCULAR at 15:54:00

## 2025-05-05 NOTE — TELEPHONE ENCOUNTER
LOV 3/31/2025 6 month medical follow up    Last CMP done on 4/18/2025 ordered by Dr. Alex Gonsales, neurology.  Blood Urea Nitrogen  7 - 20 mg/dL 39 High      Last Urology visit 5/1/2025.    Patient asking to recheck BUN.    Okay to order CMP?

## 2025-05-06 ENCOUNTER — OFFICE VISIT (OUTPATIENT)
Dept: PHYSICAL THERAPY | Age: 50
End: 2025-05-06
Attending: FAMILY MEDICINE
Payer: COMMERCIAL

## 2025-05-06 PROCEDURE — 97110 THERAPEUTIC EXERCISES: CPT
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
## 2025-05-07 ENCOUNTER — PATIENT MESSAGE (OUTPATIENT)
Dept: FAMILY MEDICINE CLINIC | Facility: CLINIC | Age: 50
End: 2025-05-07

## 2025-05-07 DIAGNOSIS — R74.01 ELEVATED ALT MEASUREMENT: Primary | ICD-10-CM

## 2025-05-07 LAB
ALBUMIN/GLOBULIN RATIO: 1.8 (CALC) (ref 1–2.5)
ALBUMIN: 4.7 G/DL (ref 3.6–5.1)
ALKALINE PHOSPHATASE: 81 U/L (ref 35–144)
ALT: 50 U/L (ref 9–46)
AST: 33 U/L (ref 10–35)
BILIRUBIN, TOTAL: 0.4 MG/DL (ref 0.2–1.2)
BUN/CREATININE RATIO: 41 (CALC) (ref 6–22)
BUN: 35 MG/DL (ref 7–25)
CALCIUM: 10.1 MG/DL (ref 8.6–10.3)
CARBON DIOXIDE: 27 MMOL/L (ref 20–32)
CHLORIDE: 102 MMOL/L (ref 98–110)
CREATININE: 0.85 MG/DL (ref 0.7–1.3)
EGFR: 106 ML/MIN/1.73M2
GLOBULIN: 2.6 G/DL (CALC) (ref 1.9–3.7)
GLUCOSE: 105 MG/DL (ref 65–99)
POTASSIUM: 4 MMOL/L (ref 3.5–5.3)
PROTEIN, TOTAL: 7.3 G/DL (ref 6.1–8.1)
SODIUM: 139 MMOL/L (ref 135–146)

## 2025-05-08 ENCOUNTER — APPOINTMENT (OUTPATIENT)
Dept: OCCUPATIONAL MEDICINE | Age: 50
End: 2025-05-08
Attending: FAMILY MEDICINE
Payer: COMMERCIAL

## 2025-05-09 ENCOUNTER — OFFICE VISIT (OUTPATIENT)
Age: 50
End: 2025-05-09
Attending: INTERNAL MEDICINE
Payer: COMMERCIAL

## 2025-05-09 VITALS
OXYGEN SATURATION: 99 % | HEART RATE: 73 BPM | DIASTOLIC BLOOD PRESSURE: 74 MMHG | HEIGHT: 73 IN | BODY MASS INDEX: 24.94 KG/M2 | RESPIRATION RATE: 18 BRPM | TEMPERATURE: 98 F | WEIGHT: 188.19 LBS | SYSTOLIC BLOOD PRESSURE: 112 MMHG

## 2025-05-09 DIAGNOSIS — Z13.29 SCREENING FOR THYROID DISORDER: Primary | ICD-10-CM

## 2025-05-09 DIAGNOSIS — Z86.2 HISTORY OF ANEMIA: ICD-10-CM

## 2025-05-09 LAB
ALBUMIN SERPL-MCNC: 4.7 G/DL (ref 3.2–4.8)
ALBUMIN/GLOB SERPL: 1.8 {RATIO} (ref 1–2)
ALP LIVER SERPL-CCNC: 62 U/L (ref 45–117)
ALT SERPL-CCNC: 47 U/L (ref 10–49)
ANION GAP SERPL CALC-SCNC: 4 MMOL/L (ref 0–18)
AST SERPL-CCNC: 34 U/L (ref ?–34)
BASOPHILS # BLD AUTO: 0.05 X10(3) UL (ref 0–0.2)
BASOPHILS NFR BLD AUTO: 0.5 %
BILIRUB SERPL-MCNC: 0.5 MG/DL (ref 0.3–1.2)
BUN BLD-MCNC: 35 MG/DL (ref 9–23)
CALCIUM BLD-MCNC: 10 MG/DL (ref 8.7–10.6)
CHLORIDE SERPL-SCNC: 104 MMOL/L (ref 98–112)
CO2 SERPL-SCNC: 28 MMOL/L (ref 21–32)
CREAT BLD-MCNC: 0.87 MG/DL (ref 0.7–1.3)
DEPRECATED HBV CORE AB SER IA-ACNC: 83 NG/ML (ref 50–336)
EGFRCR SERPLBLD CKD-EPI 2021: 105 ML/MIN/1.73M2 (ref 60–?)
EOSINOPHIL # BLD AUTO: 0.1 X10(3) UL (ref 0–0.7)
EOSINOPHIL NFR BLD AUTO: 1 %
ERYTHROCYTE [DISTWIDTH] IN BLOOD BY AUTOMATED COUNT: 13.6 %
FASTING STATUS PATIENT QL REPORTED: NO
FOLATE SERPL-MCNC: 14 NG/ML (ref 5.4–?)
GLOBULIN PLAS-MCNC: 2.6 G/DL (ref 2–3.5)
GLUCOSE BLD-MCNC: 89 MG/DL (ref 70–99)
HCT VFR BLD AUTO: 40.2 % (ref 39–53)
HGB BLD-MCNC: 13.7 G/DL (ref 13–17.5)
HGB RETIC QN AUTO: 37.5 PG (ref 28.2–36.6)
IMM GRANULOCYTES # BLD AUTO: 0.05 X10(3) UL (ref 0–1)
IMM GRANULOCYTES NFR BLD: 0.5 %
IMM RETICS NFR: 0.08 RATIO (ref 0.1–0.3)
IRON SATN MFR SERPL: 28 % (ref 20–50)
IRON SERPL-MCNC: 86 UG/DL (ref 65–175)
LYMPHOCYTES # BLD AUTO: 2.44 X10(3) UL (ref 1–4)
LYMPHOCYTES NFR BLD AUTO: 25.4 %
MCH RBC QN AUTO: 33.4 PG (ref 26–34)
MCHC RBC AUTO-ENTMCNC: 34.1 G/DL (ref 31–37)
MCV RBC AUTO: 98 FL (ref 80–100)
MONOCYTES # BLD AUTO: 1.07 X10(3) UL (ref 0.1–1)
MONOCYTES NFR BLD AUTO: 11.1 %
NEUTROPHILS # BLD AUTO: 5.91 X10 (3) UL (ref 1.5–7.7)
NEUTROPHILS # BLD AUTO: 5.91 X10(3) UL (ref 1.5–7.7)
NEUTROPHILS NFR BLD AUTO: 61.5 %
OSMOLALITY SERPL CALC.SUM OF ELEC: 289 MOSM/KG (ref 275–295)
PLATELET # BLD AUTO: 254 10(3)UL (ref 150–450)
POTASSIUM SERPL-SCNC: 4.2 MMOL/L (ref 3.5–5.1)
PROT SERPL-MCNC: 7.3 G/DL (ref 5.7–8.2)
RBC # BLD AUTO: 4.1 X10(6)UL (ref 4.3–5.7)
RETICS # AUTO: 59.5 X10(3) UL (ref 22.5–147.5)
RETICS/RBC NFR AUTO: 1.5 % (ref 0.5–2.5)
SODIUM SERPL-SCNC: 136 MMOL/L (ref 136–145)
T4 FREE SERPL-MCNC: 1.5 NG/DL (ref 0.8–1.7)
TOTAL IRON BINDING CAPACITY: 312 UG/DL (ref 250–425)
TRANSFERRIN SERPL-MCNC: 251 MG/DL (ref 215–365)
TSI SER-ACNC: 0.66 UIU/ML (ref 0.55–4.78)
VIT B12 SERPL-MCNC: 236 PG/ML (ref 211–911)
WBC # BLD AUTO: 9.6 X10(3) UL (ref 4–11)

## 2025-05-09 NOTE — PROGRESS NOTES
Pt here for consult regarding petechiae. Medications and medical history reviewed. Pt states he has had petechiae rash in the past to his arms and legs. A couple of weeks ago he did notice some petechiae to his right wrist (see image on chart from 4/23/35). His rash has since resolved. No fevers, chills, night sweats, or SOB. He does endorse fatigue and lightheadedness with positional changes. No hx of iron/blood transfusions. No family history of blood disorders or anemia. Last colonoscopy 2022 which showed internal/external hemorrhoids. No recent rectal bleeding reported.      Education Record    Learner:  Patient    Disease / Diagnosis: petechiae    Barriers / Limitations:  None   Comments:    Method:  Discussion   Comments:    General Topics:  Medication, Pain, Side effects and symptom management, and Plan of care reviewed   Comments:    Outcome:  Observed demonstration and Shows understanding   Comments:
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
## 2025-05-12 LAB — SOL TRANSFERRIN RECEPTOR: 16.5 NMOL/L

## 2025-05-14 ENCOUNTER — VIRTUAL PHONE E/M (OUTPATIENT)
Dept: PAIN CLINIC | Facility: CLINIC | Age: 50
End: 2025-05-14
Payer: COMMERCIAL

## 2025-05-14 DIAGNOSIS — M47.816 LUMBAR SPONDYLOSIS: Primary | ICD-10-CM

## 2025-05-14 PROCEDURE — 98013 SYNCH AUDIO-ONLY EST LOW 20: CPT | Performed by: ANESTHESIOLOGY

## 2025-05-14 NOTE — PROGRESS NOTES
Virtual Telephone Check-In    Arron Jared Gutierrez verbally consents to a Virtual/Telephone Check-In visit on 05/14/25.  Patient has been referred to the [facility] website at [email or web address] to review the yearly Consent to Treat document.    Patient understands and accepts financial responsibility for any deductible, co-insurance and/or co-pays associated with this service.    Duration of the service: 20 days minutes      Summary of topics discussed:       With lumbar degenerative disc disease and facet arthropathy here for follow-up.  The patient is status post bilateral lumbar medial branch block.  Unfortunately, did not have much response with this most recent set of injections.  Therefore, unable to move forward with radiofrequency ablation.  Did refer to spine surgery for consultation.      Semaj Avila MD

## 2025-05-22 RX ORDER — CYANOCOBALAMIN (VITAMIN B-12) 1000 MCG
1000 TABLET ORAL DAILY
Qty: 90 TABLET | Refills: 1 | Status: SHIPPED | OUTPATIENT
Start: 2025-05-22

## 2025-05-23 ENCOUNTER — OFFICE VISIT (OUTPATIENT)
Facility: LOCATION | Age: 50
End: 2025-05-23
Payer: COMMERCIAL

## 2025-05-23 ENCOUNTER — APPOINTMENT (OUTPATIENT)
Dept: PHYSICAL THERAPY | Age: 50
End: 2025-05-23
Attending: FAMILY MEDICINE
Payer: COMMERCIAL

## 2025-05-23 DIAGNOSIS — B35.1 ONYCHOMYCOSIS: Primary | ICD-10-CM

## 2025-05-23 DIAGNOSIS — S90.221S: ICD-10-CM

## 2025-05-23 DIAGNOSIS — L60.3 NAIL DYSTROPHY: ICD-10-CM

## 2025-05-23 PROCEDURE — 99213 OFFICE O/P EST LOW 20 MIN: CPT | Performed by: PODIATRIST

## 2025-05-23 NOTE — PROGRESS NOTES
The following individual(s) verbally consented to be recorded using ambient AI listening technology and understand that they can each withdraw their consent to this listening technology at any point by asking the clinician to turn off or pause the recording:    Patient name: Arron Gutierrez  Additional names:
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
## 2025-05-27 ENCOUNTER — OFFICE VISIT (OUTPATIENT)
Dept: PHYSICAL THERAPY | Age: 50
End: 2025-05-27
Attending: FAMILY MEDICINE
Payer: COMMERCIAL

## 2025-05-27 PROCEDURE — 97140 MANUAL THERAPY 1/> REGIONS: CPT

## 2025-05-27 PROCEDURE — 97110 THERAPEUTIC EXERCISES: CPT

## 2025-05-27 NOTE — PROGRESS NOTES
Patient: Arron Gutierrez (50 year old, male) Referring Provider:  Insurance:   Diagnosis: Inguinal strain, right, initial encounter (S76.429A) Jorge CANO   Date of Surgery: No data recorded Next MD visit:  N/A   Precautions:  Fall Risk (R shoulder, lumbar, cervical, hip pain) No data recorded Referral Information:    Date of Evaluation: Req: 0, Auth: 0, Exp:     04/23/25 POC Auth Visits:  8       Today's Date   5/27/2025    Subjective  \" My right hip feels stiff after sitting for too long but I do not have any pain in it with walking or with stairs negotiation . I went on wacation for 2 weeks and I triped over 2 times and a fell 2 times . I feel like I do not  my feet high and I shuffle .       Pain: 0/10     Objective                 Assessment  Incorporated STM to both hip flexors to decrease connective tissue tightness for improved gait mechanics and initiated core strength activites to promote improved functional postural stability  with gait during performance sustained upright activites . Patient demonstrated significant mm fatigue with completion of TrA activation with bridging and with clam shells activites .    Goals (to be met in 8 visits)   Pt to be independent in HEP addressing LE stretching and strength ex's to improve flexibility and stability for functional activity.  Pt to show improved Hamstring flexibility by 10 degrees on right LE for improved muscle length in mobility.  Pt to show improved quadricep flexibility to 100 degrees knee flexion or better in prone.  Pt to report decreased frequency of spasming/pain of LE's by 25% for improved tolerance to turning/reaching/mobility.  Pt to display improved glut max strength to 4+/5 for improved stability in gait and decreased risk for fall.           Plan  Continue PT as per current POC .    Treatment Last 4 Visits  Treatment Day: 5 5/2/2025 5/6/2025 5/27/2025   LE Treatment   Therapeutic Exercise NU step x 6 min , level 6      Recumbent bike x 6 min , level 5  Supine :  Tra activation with bridging with lower legs on SB 2 x 10   TrA activation with adductors activation 2 x 10   TrA activation with abductors activation 2 x 10   R/L TrA activation with clam shell with green TB 2 x 10 ea   Standing on air ex :  TrA / Multifidus activation with Green TB :  Alt hip flex 2 x 15   Alt hip abd  2 x 15   Alt hip ext 2 x 15   Side stepping with Green TB 40 ft 2 rounds   Monster walk with Green TB 40 ft 2 rounds    Manual Therapy Manual stretches :  Supine :  R/L HS 5 x 30 sec hold   R/L piriformis ( ER/IR ) 10 x 10 ea   R/L hip flexor 10 x 10 sec hold   R/L groin stretch 10 x 10 sec hold    Prone :  R/L hip flexor / quad stretch 10 x 10 sec hold   Prayer stretch 10 x 10 sec hold   Seated :  Trunk flexion stretch 10 x 10 sec hold   Trunk flexion stretch to R/L 10 x 10 sec hold     Supine :  STM R/L to hip flexor    Additional Treatment  See additional assessment above.      Therapeutic Exercise Minutes  45 30   Manual Therapy Minutes 45  15   Total Time Of Timed Procedures 45 45 45   Total Time Of Service-Based Procedures 0 0 0   Total Treatment Time 45 45 45        HEP       Charges     manual x 1 , there - ex x 2

## 2025-05-28 ENCOUNTER — OFFICE VISIT (OUTPATIENT)
Dept: OCCUPATIONAL MEDICINE | Facility: HOSPITAL | Age: 50
End: 2025-05-28
Attending: FAMILY MEDICINE
Payer: COMMERCIAL

## 2025-05-28 DIAGNOSIS — G70.9 NEUROMUSCULAR DISEASE (HCC): ICD-10-CM

## 2025-05-28 DIAGNOSIS — M79.10 MYALGIA: Primary | ICD-10-CM

## 2025-05-28 PROCEDURE — 97110 THERAPEUTIC EXERCISES: CPT

## 2025-05-28 PROCEDURE — 97167 OT EVAL HIGH COMPLEX 60 MIN: CPT

## 2025-05-28 NOTE — PROGRESS NOTES
OT NEURO EVALUATION:     Diagnosis:   Myalgia (M79.10)  Neuromuscular disease (HCC) (G70.9) Patient:  Arron Gutierrez (50 year old, male)        Referring Provider: Jorge Toussaint  Today's Date   5/28/2025    Precautions:  Fall Risk   Date of Evaluation: 05/28/25  Next MD visit: TBD  Date of Injury: n/a  Date of Surgery: n/a     PATIENT SUMMARY   Summary of chief complaints: BUE shoulder ROM and strength deficits, hand tremors, coordination deficits, and balance deficits. 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 Reports he would like to work on strength, coordination, and ROM management for BUE to facilitate improved function.  Pain level: current 3 /10, at best 2 /10, at worst 7 /10  Occupation: Self Employed in Finance    Prior level of function: (I) w/ all ADL/IADLs  Current limitations: ability to play tennis, balance, strength/ROM/coordination in BUE  Pt goals: improve functional ROM, strength, and coordination in BUE to function  Hand Dominance: right  Living Situation: family    Imaging/Tests: n/a   Arron  has a past medical history of Anemia (4/21), Arthritis (2021), Back pain (12/21), Bleeding nose (1/2000), Bloating (6/21), Blood in the stool (5/21), Blurred vision (9/20), Cerebellar atrophy (10/2022), Constipation (12/20), COVID-19, Depression (3/23), Diarrhea, unspecified (9/22), Dilantin toxicity, Dizziness (9/20), Dyssynergia, Esophageal reflux (2023), Fatigue (10/22), Headache disorder (10/22), Hemorrhoids (10/22), Irregular bowel habits (10/20), Myomalacia (10/2022), Neurologic disorder (1/2022), Night sweats (12/22), Osteoarthritis (1/21), Pain in joints (12/21), Pain with bowel movements (3/21), Rash (4/21), Seizure disorder (HCC) (1989), Sleep disturbance (1/22), Stool incontinence (5/21), Uncomfortable fullness after meals (6/21),  Visual impairment, Wears glasses (9/20), and Weight loss (12/20).  He  has a past surgical history that includes other surgical history ('76); other surgical history (2011); other; colonoscopy (10/2022); and skin surgery (1976).    ASSESSMENT  Arron presents to occupational therapy evaluation with primary c/o BUE shoulder ROM and strength deficits, hand tremors, coordination deficits, and balance deficits. 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 Reports he would like to work on strength, coordination, and ROM management for BUE to facilitate improved function.. The results of the objective tests and measures show decreased ROM, strength, and coordination in BUE. Functional deficits include but are not limited to (I) w/ all ADL/IADLs. Signs and symptoms are consistent with diagnosis of Myalgia (M79.10)  Neuromuscular disease (HCC) (G70.9). Pt and OT discussed evaluation findings, pathology, POC and HEP.  Pt voiced understanding and performs HEP correctly without reported pain. Skilled Occupational Therapy is medically necessary to address the above impairments and reach functional goals.  OBJECTIVE:      Observation:   Nine-Hole Peg Test: R=24.94 sec; L=28.09 sec  Finger-to-Nose Test: WFL     ROM and Strength:  BUE AROM WFL except noted below  Shoulder   ROM MMT (-/5)    R L R L     Flex 120 120 4/5 4/5     Ext 60 60 4+/5 4+/5     Abd (C5) 90 120 4/5 4/5     IR 60 60 4/5 4/5     ER 35 40 4/5 4/5        Elbow   ROM MMT (-/5)    R L R L     Flex (C6) 135 135 5/5 4+/5     Ext (C7) 0 0 5/5 5/5     Pronation 90 90 4+/5 4+/5     Supination 90 90 4+/5 4+/5      Wrist   ROM MMT (-/5)    R L R L     Flex (C7) 65 65 4+/5 4+/5     Ext (C6) 60 60 4+/5 4+/5     Ulnar Dev 40 40 4+/5 4+/5     Radial Dev 25 25 4+/5 4+/5        Hand Strength (lbs) R L      103.2 82.8     2 pt  Pinch 11 10     3 pt Pinch 20 20     Lateral pinch 21 20       Sensory: Patient denies any numbness/tingling today.    .evalendToday's Treatment and Response:   Pt education was provided on exam findings, treatment diagnosis, treatment plan, expectations, and prognosis.  Today's Treatment       5/28/2025   OT Treatment   Therapeutic Exercise -Shoulder ROM and Warm-Up Stretches  -Red Theraband Shoulder Strengthening Exercises  -Ergonomic Desk Checklist     Therapeutic Exercise Min 15   Eval Min 30   Total Timed Procedures 15   Total Service Procedures 45   Total Time 45       Patient was instructed in and issued a HEP for: -Shoulder ROM and Warm-Up Stretches  -Red Theraband Shoulder Strengthening Exercises  -Ergonomic Desk Checklist     Charges:  OT EVAL High Complexity, TEx1   Based on analysis of data from a comprehensive assessment from an extensive chart review, clinical presentation of physical, cognitive and psychosocial skills, as well as review of patient rated outcome measures, this evaluation involved High complexity decision making, with 5+ occupational performance component deficits, presents with comorbidities, and significant modification of tasks or assistance.                                                                PLAN OF CARE:    Goals: (to be met in 12 visits)   1.Patient will demonstrate full AROM for bilateral shoulder flexion and abduction indicating increased ability to manage functional ADL/IADLs.  2. Patient will demonstrate 5/5 overall bilateral shoulder strength to facilitate recovery.  3. Patient will demonstrate at least 25.0 sec or less for R Nine-Hole Peg Test indicating increased ability to manage functional ADL/IADLs.  4. Patient will demonstrate at least 5.0 lbs increase in bilateral  strength to facilitate recovery.  5. Patient will be independent with HEP to facilitate recovery.     Frequency / Duration: Patient will be seen 2 x/week or a total of 12 visits over a 90 day  period. Treatment will include: Manual Therapy; Neuromuscular Re-education; Self-Care Home Management; Therapeutic Activities; Therapeutic Exercise; Home Exercise Program instruction; Electrical stimulation (attended); Ultrasound; Other (use comment) (Orthotics, Modaltiies PRN)    Education or treatment limitation: None   Rehab Potential: good     QuickDASH Outcome Score  Score: (Patient-Rptd) 36.36 % (5/25/2025  7:26 PM)      Patient/Family/Caregiver was advised of these findings, precautions, and treatment options and has agreed to actively participate in planning and for this course of care.    Thank you for your referral. Please co-sign or sign and return this letter via fax as soon as possible to [phone]. If you have any questions, please contact me at Dept: [phone]    Sincerely,  Electronically signed by therapist: Melecio Vega OT  Physician's certification required: Yes  I certify the need for these services furnished under this plan of treatment and while under my care.    X___________________________________________________ Date____________________    Certification From: 5/28/2025  To:8/26/2025

## 2025-05-29 ENCOUNTER — APPOINTMENT (OUTPATIENT)
Dept: OCCUPATIONAL MEDICINE | Age: 50
End: 2025-05-29
Attending: FAMILY MEDICINE
Payer: COMMERCIAL

## 2025-05-29 ENCOUNTER — OFFICE VISIT (OUTPATIENT)
Dept: FAMILY MEDICINE CLINIC | Facility: CLINIC | Age: 50
End: 2025-05-29
Payer: COMMERCIAL

## 2025-05-29 VITALS
TEMPERATURE: 98 F | OXYGEN SATURATION: 96 % | SYSTOLIC BLOOD PRESSURE: 104 MMHG | RESPIRATION RATE: 16 BRPM | WEIGHT: 181 LBS | HEART RATE: 66 BPM | BODY MASS INDEX: 23.99 KG/M2 | DIASTOLIC BLOOD PRESSURE: 70 MMHG | HEIGHT: 73 IN

## 2025-05-29 DIAGNOSIS — J01.90 ACUTE SINUSITIS, RECURRENCE NOT SPECIFIED, UNSPECIFIED LOCATION: Primary | ICD-10-CM

## 2025-05-29 PROCEDURE — 99213 OFFICE O/P EST LOW 20 MIN: CPT | Performed by: FAMILY MEDICINE

## 2025-05-29 RX ORDER — DIAZEPAM 5 MG/1
TABLET ORAL
COMMUNITY

## 2025-05-29 RX ORDER — GLYCOPYRROLATE 1 MG/1
1 TABLET ORAL 3 TIMES DAILY
Qty: 270 TABLET | Refills: 3 | Status: SHIPPED | OUTPATIENT
Start: 2025-05-29

## 2025-05-29 RX ORDER — AZITHROMYCIN 250 MG/1
TABLET, FILM COATED ORAL
Qty: 6 TABLET | Refills: 0 | Status: SHIPPED | OUTPATIENT
Start: 2025-05-29 | End: 2025-06-03

## 2025-05-29 NOTE — PROGRESS NOTES
Subjective:   Patient ID: Arron Gutierrez is a 50 year old male.    HPI  Mr. 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  He and his wife were recently in [place] and wife was sick with similar symptoms.  He also has been having cough which is productive of phlegm.  No fever no chest pain no shortness of breath.  He feels that there is pressure within his ears.  He had tried Xyzal at nighttime which seemed to have helped.  Cough tends to happen more in the morning.    I had reviewed past medical and family histories together with allergy and medication lists documented.      History/Other:   Review of Systems   Respiratory:  Negative for shortness of breath.    Cardiovascular:  Negative for chest pain.   Gastrointestinal:  Negative for abdominal pain, diarrhea, nausea and vomiting.     Current Medications[1]  Allergies:Allergies[2]    Objective:   Physical Exam  Constitutional:       General: He is not in acute distress.  HENT:      Right Ear: Ear canal normal. No middle ear effusion. Tympanic membrane is bulging. Tympanic membrane is not erythematous.      Left Ear: Ear canal normal.  No middle ear effusion. Tympanic membrane is bulging. Tympanic membrane is not erythematous.      Nose: No congestion or rhinorrhea.      Mouth/Throat:      Mouth: Mucous membranes are moist.      Pharynx: Oropharynx is clear. No oropharyngeal exudate or posterior oropharyngeal erythema.   Eyes:      General: No scleral icterus.     Conjunctiva/sclera: Conjunctivae normal.   Cardiovascular:      Rate and Rhythm: Normal rate and regular rhythm.      Heart sounds: Normal heart sounds. No murmur heard.  Pulmonary:      Effort: Pulmonary effort is normal. No respiratory distress.      Breath sounds: Normal breath sounds. No wheezing or rales.   Musculoskeletal:       Cervical back: Neck supple.      Right lower leg: No edema.      Left lower leg: No edema.   Lymphadenopathy:      Cervical: No cervical adenopathy.   Neurological:      Mental Status: He is alert.         Assessment & Plan:   1. Acute sinusitis, recurrence not specified, unspecified location    - Continue with Xyzal and Sudafed as needed  - Keep hydrated  - May take Tylenol as needed for fever or pain  - Will treat with Z-Clemente  - Go to the emergency room if with respiratory distress and/or severe dehydration  - Call or come in sooner if symptoms worsen or persist    No orders of the defined types were placed in this encounter.      This note was prepared using Dragon Medical voice recognition dictation software. As a result errors may occur. When identified these errors have been corrected. While every attempt is made to correct errors during dictation discrepancies may still exist            Meds This Visit:  Requested Prescriptions     Signed Prescriptions Disp Refills    azithromycin (ZITHROMAX Z-CLEMENTE) 250 MG Oral Tab 6 tablet 0     Sig: Take 2 tablets (500 mg total) by mouth daily for 1 day, THEN 1 tablet (250 mg total) daily for 4 days.       Imaging & Referrals:  None         [1]   Current Outpatient Medications   Medication Sig Dispense Refill    azithromycin (ZITHROMAX Z-CLEMENTE) 250 MG Oral Tab Take 2 tablets (500 mg total) by mouth daily for 1 day, THEN 1 tablet (250 mg total) daily for 4 days. 6 tablet 0    Cyanocobalamin (VITAMIN B12) 1000 MCG Oral Tab Take 1,000 mcg by mouth daily. 90 tablet 1    celecoxib 200 MG Oral Cap Take 1 capsule (200 mg total) by mouth 2 (two) times daily. 180 capsule 1    tamsulosin 0.4 MG Oral Cap Take 1 capsule (0.4 mg total) by mouth every evening. 90 capsule 6    Clindamycin Phosphate 1 % External Swab Apply to groin twice daily 60 each 11    DULoxetine 30 MG Oral Cap DR Particles TAKE 1 CAPSULE EVERY DAY. TAKE WITH 60 MG CAPSULE FOR A TOTAL OF 90 MG.      traMADol 50 MG Oral  Tab TAKE 1 TO 2 TABLETS DAILY AS NEEDED FOR BREAKTHROUGH PAIN. 20 tablet 1    traZODone 50 MG Oral Tab Take 1 tablet (50 mg total) by mouth nightly. 90 tablet 0    Aluminum Chloride (DRYSOL) 20 % External Solution Apply 1 Application topically nightly. 75 mL 0    BD PEN NEEDLE MINI U/F 31G X 5 MM Does not apply Misc       famotidine 40 MG Oral Tab Take 1 tablet (40 mg total) by mouth nightly as needed.      DULoxetine 60 MG Oral Cap DR Particles Take 1 capsule (60 mg total) by mouth daily. 90 capsule 2    gabapentin 400 MG Oral Cap Take 1 capsule (400 mg total) by mouth 2 (two) times daily. (Patient taking differently: Take 1 capsule (400 mg total) by mouth 3 (three) times daily.) 60 capsule 5    azelastine 0.1 % Nasal Solution 1 spray by Nasal route as needed.      levocetirizine 5 MG Oral Tab Take 1 tablet (5 mg total) by mouth nightly as needed.      glycopyrrolate 1 MG Oral Tab TAKE 1 TABLET BY MOUTH 3 TIMES DAILY. 270 tablet 3    cephalexin 500 MG Oral Cap Take 1 capsule (500 mg total) by mouth 3 (three) times daily. 90 capsule 5    Teriparatide, Recombinant, (FORTEO) 600 MCG/2.4ML Subcutaneous Solution Pen-injector Inject 20 mcg into the skin in the morning.      cyclobenzaprine 5 MG Oral Tab Take 1 tablet (5 mg total) by mouth as needed in the morning and 1 tablet (5 mg total) as needed at noon and 1 tablet (5 mg total) as needed in the evening.      XIIDRA 5 % Ophthalmic Solution       baclofen 10 MG Oral Tab Take 1 tablet (10 mg total) by mouth in the morning and 1 tablet (10 mg total) in the evening and 1 tablet (10 mg total) before bedtime.      diazePAM 5 MG Oral Tab TAKE 1 TABLET BY MOUTH 1 HOUR PRIOR TO PROCEDURE AND BRING THE REST TO THE PROCEDURE     [2]   Allergies  Allergen Reactions    Carbamazepine FEVER, FACE FLUSHING and ITCHING

## 2025-05-29 NOTE — PATIENT INSTRUCTIONS
Thank you for choosing Pablito Knight MD at [facility]  To Do: Arron Jared Gutierrez  1. Please see below   Call [phone] to schedule the appointment.   Please signup for Memorop, which is electronic access to your record if you have not done so.  All your results will post on there.  [email or web address]   You can NOW use Memorop to book your appointments with us, or consider using open access scheduling which is through the [place] website [email or web address] and type in Pablito Knight MD and follow the links for \"Schedule Online Now\"    To schedule Imaging or tests at [place] call Central Scheduling [phone], Go to [facility] ER Building (For example: CT scans, X rays, Ultrasound, MRI)  Cardiac Testing in ER building Building A second floor Cardiac Testing [phone] (For example: Holter Monitor, Cardiac Stress tests,Event Monitor, or 2D Echocardiograms)  [facility] call [phone] usually in [facility]  Walk in [facility] in [place] at [address] Mon-Fri at 8am-7:30 p.m., and Sat/Sun 9:00a.m.-4:30 p.m.  Also at [address] [place] [address]  Call [phone] for info     Please call our office about any questions regarding your treatment/medicines/tests as a result of today's visit.  For your safety, read the entire package insert of all medicines prescribed to you and be aware of all of the risks of treatment even beyond those discussed today.  All therapies have potential risk of harm or side effects or medication interactions.  It is your duty and for your safety to discuss with the pharmacist and our office with questions, and to notify us and stop treatment if problems arise, but know that our intention is that the benefits outweigh those potential risks and we strive to make you healthier and to improve your quality of life.    Referrals, and Radiology Information:    If your insurance requires a referral to a specialist, please allow 5 business days to  process your referral request.    If Pablito Knight MD orders a CT or MRI, it may take up to 10 business days to receive approval from your insurance company. Once our office has called informing you that the insurance company approved your testing, please call Central Scheduling at [phone]  Please allow our office 5 business days to contact you regarding any testing results.    Refill policies:   Allow 3 business days for refills; controlled substances may take longer and must be picked up from the office in person.  Narcotic medications can only be filled in 30 day increments and must be refilled at an office visit only.  If your prescription is due for a refill, you may be due for a follow-up appointment.  We cannot refill your maintenance medications at a preventative wellness visit.  To best provide you care, patients receiving maintenance medications need to be seen at least twice a year.

## 2025-05-30 LAB
ALBUMIN/GLOBULIN RATIO: 1.8 (CALC) (ref 1–2.5)
ALBUMIN: 4.3 G/DL (ref 3.6–5.1)
ALKALINE PHOSPHATASE: 60 U/L (ref 35–144)
ALT: 33 U/L (ref 9–46)
AST: 28 U/L (ref 10–35)
BILIRUBIN, TOTAL: 0.5 MG/DL (ref 0.2–1.2)
BUN/CREATININE RATIO: 38 (CALC) (ref 6–22)
BUN: 32 MG/DL (ref 7–25)
CALCIUM: 9.5 MG/DL (ref 8.6–10.3)
CARBON DIOXIDE: 27 MMOL/L (ref 20–32)
CHLORIDE: 102 MMOL/L (ref 98–110)
CREATININE: 0.84 MG/DL (ref 0.7–1.3)
EGFR: 106 ML/MIN/1.73M2
GLOBULIN: 2.4 G/DL (CALC) (ref 1.9–3.7)
GLUCOSE: 89 MG/DL (ref 65–99)
POTASSIUM: 4 MMOL/L (ref 3.5–5.3)
PROTEIN, TOTAL: 6.7 G/DL (ref 6.1–8.1)
SODIUM: 138 MMOL/L (ref 135–146)

## 2025-06-02 ENCOUNTER — APPOINTMENT (OUTPATIENT)
Dept: OCCUPATIONAL MEDICINE | Age: 50
End: 2025-06-02
Attending: FAMILY MEDICINE
Payer: COMMERCIAL

## 2025-06-02 ENCOUNTER — OFFICE VISIT (OUTPATIENT)
Dept: OCCUPATIONAL MEDICINE | Facility: HOSPITAL | Age: 50
End: 2025-06-02
Attending: FAMILY MEDICINE
Payer: COMMERCIAL

## 2025-06-02 PROCEDURE — 97110 THERAPEUTIC EXERCISES: CPT

## 2025-06-02 NOTE — PROGRESS NOTES
Patient: Arron Gutierrez (50 year old, male) Referring Provider:  Insurance:   Diagnosis: Myalgia (M79.10)  Neuromuscular disease (HCC) (G70.9) Jorge CANO   Date of Surgery: n/a Next MD visit:  N/A   Precautions:  Fall Risk TBD Referral Information:   Date of Injury: n/a Date of Evaluation: Req: 0, Auth: 0, Exp:     05/28/25 POC Auth Visits:  12       Today's Date   6/2/2025    Subjective  Patient presents to OT appt today noting he has been completing home exercises. Inetrmittent muscle spasms still occuring. Focus on today's treatment on strengthening and endurance of BUE.       Pain: 3/10     Objective       Observation:   Nine-Hole Peg Test: R=24.94 sec; L=28.09 sec  Finger-to-Nose Test: WFL     ROM and Strength:  BUE AROM WFL except noted below  Shoulder   ROM MMT (-/5)    R L R L     Flex 120 120 4/5 4/5     Ext 60 60 4+/5 4+/5     Abd (C5) 90 120 4/5 4/5     IR 60 60 4/5 4/5     ER 35 40 4/5 4/5        Elbow   ROM MMT (-/5)    R L R L     Flex (C6) 135 135 5/5 4+/5     Ext (C7) 0 0 5/5 5/5     Pronation 90 90 4+/5 4+/5     Supination 90 90 4+/5 4+/5      Wrist   ROM MMT (-/5)    R L R L     Flex (C7) 65 65 4+/5 4+/5     Ext (C6) 60 60 4+/5 4+/5     Ulnar Dev 40 40 4+/5 4+/5     Radial Dev 25 25 4+/5 4+/5        Hand Strength (lbs) R L      103.2 82.8     2 pt Pinch 11 10     3 pt Pinch 20 20     Lateral pinch 21 20            Assessment  Patient demonstrating progressive improvements in terms of BUE AROM and strength. Focus on targeted eccentric strengthening, slow and controlled exercises. Patient responded well with no noted muscle spasms during exercises today. Patient would benefit from continued OT services at this time.    Goals (to be met in 12 visits)   1.Patient will demonstrate full AROM for bilateral shoulder flexion and abduction indicating increased ability to manage functional ADL/IADLs.  2. Patient will demonstrate 5/5 overall bilateral shoulder strength to facilitate  recovery.  3. Patient will demonstrate at least 25.0 sec or less for R Nine-Hole Peg Test indicating increased ability to manage functional ADL/IADLs.  4. Patient will demonstrate at least 5.0 lbs increase in bilateral  strength to facilitate recovery.  5. Patient will be independent with HEP to facilitate recovery.         Plan  Patient will be seen 2 x/week or a total of 12 visits over a 90 day period. Treatment will include: Manual Therapy; Neuromuscular Re-education; Self-Care Home Management; Therapeutic Activities; Therapeutic Exercise; Home Exercise Program instruction; Electrical stimulation (attended); Ultrasound; Other (use comment) (Orthotics, Modaltiies PRN)    Treatment Last 4 Visits        5/28/2025 6/2/2025   OT Treatment   Treatment Day  2   Therapeutic Exercise -Shoulder ROM and Warm-Up Stretches  -Red Theraband Shoulder Strengthening Exercises  -Ergonomic Desk Checklist   -AROM Shoulder Flexion, Abduction, External Rotation Dowel Josesito Exercises  -3 lb Weight Slow, Controlled 5 sec hold exercises for shoulder flexion, shoulder abduction, external rotation 2 sets of 10 reps each  -Green Flexbar Exercises for pronation, supination, wrist flexion, wrist extension 2 sets of 10 rep each 5 sec hold  -40 lb  Bar 2 sets of 10 reps each 5 sec hold  -Red Digiflex Individual Digit 1 set of 10 reps each  -Green  Clip 3-Point Pinch 5 sec hold 1 set of 10 reps each   Therapeutic Exercise Min 15 45   Eval Min 30    Total Timed Procedures 15 45   Total Service Procedures 45 45   Total Time 45 45         HEP   -Shoulder ROM and Warm-Up Stretches  -Red Theraband Shoulder Strengthening Exercises  -Ergonomic Desk Checklist     Charges     TEx3

## 2025-06-03 ENCOUNTER — OFFICE VISIT (OUTPATIENT)
Dept: PHYSICAL THERAPY | Age: 50
End: 2025-06-03
Attending: FAMILY MEDICINE
Payer: COMMERCIAL

## 2025-06-03 PROCEDURE — 97110 THERAPEUTIC EXERCISES: CPT

## 2025-06-03 NOTE — PROGRESS NOTES
Patient: Arron Gutierrez (50 year old, male) Referring Provider:  Insurance:   Diagnosis: Inguinal strain, right, initial encounter (S76.926A) Jorge CANO   Date of Surgery: No data recorded Next MD visit:  N/A   Precautions:  Fall Risk (R shoulder, lumbar, cervical, hip pain) No data recorded Referral Information:    Date of Evaluation: Req: 0, Auth: 0, Exp:     04/23/25 POC Auth Visits:  8       Today's Date   6/3/2025    Subjective  \" My abdomials kallie sore for few days after last visit but today I am feeling good . I almost tripped over this past weekend \".       Pain: 0/10     Objective                 Assessment  Continued with trunk stabilization activites  and added 2 lbs to marches and SLR in supine and incorporated core strength activites in seated position on SB  to further progress core stabilily with sustained upright activites with patient good tolerance . Patient was challenged with core stability ex's perfomed on SB and had difficult time to maintain seated balance .    Goals (to be met in 8 visits)   Pt to be independent in HEP addressing LE stretching and strength ex's to improve flexibility and stability for functional activity.  Pt to show improved Hamstring flexibility by 10 degrees on right LE for improved muscle length in mobility.  Pt to show improved quadricep flexibility to 100 degrees knee flexion or better in prone.  Pt to report decreased frequency of spasming/pain of LE's by 25% for improved tolerance to turning/reaching/mobility.  Pt to display improved glut max strength to 4+/5 for improved stability in gait and decreased risk for fall.        Plan  Continue PT as per current POC .    Treatment Last 4 Visits  Treatment Day: 6       5/2/2025 5/6/2025 5/27/2025 6/3/2025   LE Treatment   Therapeutic Exercise NU step x 6 min , level 6     Recumbent bike x 6 min , level 5  Supine :  Tra activation with bridging with lower legs on SB 2 x 10   TrA activation with adductors  activation 2 x 10   TrA activation with abductors activation 2 x 10   R/L TrA activation with clam shell with green TB 2 x 10 ea   Standing on air ex :  TrA / Multifidus activation with Green TB :  Alt hip flex 2 x 15   Alt hip abd  2 x 15   Alt hip ext 2 x 15   Side stepping with Green TB 40 ft 2 rounds   Monster walk with Green TB 40 ft 2 rounds  Recumbent bike x 6 min , level 4  Supine :  LTR with lower legs on SB 10 x 10 secs ea  R/L piriformis stretch 10 x 10 sec hold   TrA/ Multifidus activation with :  Bridging with lower legs on SB 2 x 15   Bridging with abductors activation with fitness ring 2 x 15   Bridging with adductors with yellow ball 2 x 15   TrA / Multifidus activation with 2 lbs with :  Alt marches 2 x 15   SLR R/L 2 x 10   Walks outs 2 x 15   Seated on SB :  TrA/ Multifidus activation with 2 lbs for U/L ES :  Alt hip flex 2 x 15   Alt sh flexion 2 x 15   Alt hip flex with sh flex 2 x 15    Manual Therapy Manual stretches :  Supine :  R/L HS 5 x 30 sec hold   R/L piriformis ( ER/IR ) 10 x 10 ea   R/L hip flexor 10 x 10 sec hold   R/L groin stretch 10 x 10 sec hold    Prone :  R/L hip flexor / quad stretch 10 x 10 sec hold   Prayer stretch 10 x 10 sec hold   Seated :  Trunk flexion stretch 10 x 10 sec hold   Trunk flexion stretch to R/L 10 x 10 sec hold     Supine :  STM R/L to hip flexor     Additional Treatment  See additional assessment above.       Therapeutic Exercise Minutes  45 30 45   Manual Therapy Minutes 45  15    Total Time Of Timed Procedures 45 45 45 45   Total Time Of Service-Based Procedures 0 0 0 0   Total Treatment Time 45 45 45 45        HEP       Charges     there - ex x 3

## 2025-06-05 ENCOUNTER — APPOINTMENT (OUTPATIENT)
Dept: OCCUPATIONAL MEDICINE | Age: 50
End: 2025-06-05
Attending: FAMILY MEDICINE
Payer: COMMERCIAL

## 2025-06-05 ENCOUNTER — OFFICE VISIT (OUTPATIENT)
Dept: SURGERY | Facility: CLINIC | Age: 50
End: 2025-06-05
Payer: COMMERCIAL

## 2025-06-05 DIAGNOSIS — R82.90 URINE FINDING: Primary | ICD-10-CM

## 2025-06-05 PROCEDURE — 99214 OFFICE O/P EST MOD 30 MIN: CPT | Performed by: SURGERY

## 2025-06-05 PROCEDURE — 81002 URINALYSIS NONAUTO W/O SCOPE: CPT | Performed by: SURGERY

## 2025-06-05 RX ORDER — SULFAMETHOXAZOLE AND TRIMETHOPRIM 800; 160 MG/1; MG/1
1 TABLET ORAL 2 TIMES DAILY
Qty: 14 TABLET | Refills: 0 | Status: SHIPPED | OUTPATIENT
Start: 2025-06-05 | End: 2025-06-12
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
## 2025-06-09 ENCOUNTER — APPOINTMENT (OUTPATIENT)
Dept: OCCUPATIONAL MEDICINE | Age: 50
End: 2025-06-09
Attending: FAMILY MEDICINE
Payer: COMMERCIAL

## 2025-06-09 ENCOUNTER — OFFICE VISIT (OUTPATIENT)
Dept: OCCUPATIONAL MEDICINE | Facility: HOSPITAL | Age: 50
End: 2025-06-09
Attending: FAMILY MEDICINE
Payer: COMMERCIAL

## 2025-06-09 PROCEDURE — 97110 THERAPEUTIC EXERCISES: CPT

## 2025-06-09 NOTE — PROGRESS NOTES
Patient: Arron Gutierrez (50 year old, male) Referring Provider:  Insurance:   Diagnosis: Myalgia (M79.10)  Neuromuscular disease (HCC) (G70.9) Jorge CANO   Date of Surgery: n/a Next MD visit:  N/A   Precautions:  Fall Risk TBD Referral Information:   Date of Injury: n/a Date of Evaluation: Req: 0, Auth: 0, Exp:     05/28/25 POC Auth Visits:  12       Today's Date   6/9/2025    Subjective  Patient notes he had ablasion done last week and feeling improvement when getting out of bed. Working on strengthening at home as well as HEP but some stiffness still present especially in bilateral shoulders.       Pain: 3/10     Objective       Observation:   Nine-Hole Peg Test: R=24.94 sec; L=28.09 sec  Finger-to-Nose Test: WFL     ROM and Strength:  BUE AROM WFL except noted below  Shoulder   ROM MMT (-/5)    R L R L     Flex 120 120 4/5 4/5     Ext 60 60 4+/5 4+/5     Abd (C5) 90 120 4/5 4/5     IR 60 60 4/5 4/5     ER 35 40 4/5 4/5        Elbow   ROM MMT (-/5)    R L R L     Flex (C6) 135 135 5/5 4+/5     Ext (C7) 0 0 5/5 5/5     Pronation 90 90 4+/5 4+/5     Supination 90 90 4+/5 4+/5      Wrist   ROM MMT (-/5)    R L R L     Flex (C7) 65 65 4+/5 4+/5     Ext (C6) 60 60 4+/5 4+/5     Ulnar Dev 40 40 4+/5 4+/5     Radial Dev 25 25 4+/5 4+/5        Hand Strength (lbs) R L      103.2 82.8     2 pt Pinch 11 10     3 pt Pinch 20 20     Lateral pinch 21 20            Assessment  Patient demonstrating progressive improvements in terms of BUE AROM and strength. Focus on targeted eccentric strengthening, slow and controlled exercises. Patient responded well with no noted muscle spasms during exercises today. Patient would benefit from continued OT services at this time.    Goals (to be met in 12 visits)   1.Patient will demonstrate full AROM for bilateral shoulder flexion and abduction indicating increased ability to manage functional ADL/IADLs.  2. Patient will demonstrate 5/5 overall bilateral shoulder strength  to facilitate recovery.  3. Patient will demonstrate at least 25.0 sec or less for R Nine-Hole Peg Test indicating increased ability to manage functional ADL/IADLs.  4. Patient will demonstrate at least 5.0 lbs increase in bilateral  strength to facilitate recovery.  5. Patient will be independent with HEP to facilitate recovery.             Plan  Patient will be seen 2 x/week or a total of 12 visits over a 90 day period. Treatment will include: Manual Therapy; Neuromuscular Re-education; Self-Care Home Management; Therapeutic Activities; Therapeutic Exercise; Home Exercise Program instruction; Electrical stimulation (attended); Ultrasound; Other (use comment) (Orthotics, Modaltiies PRN)    Treatment Last 4 Visits        5/28/2025 6/2/2025 6/9/2025   OT Treatment   Treatment Day  2 3    3   Therapeutic Exercise -Shoulder ROM and Warm-Up Stretches  -Red Theraband Shoulder Strengthening Exercises  -Ergonomic Desk Checklist   -AROM Shoulder Flexion, Abduction, External Rotation Dowel Josesito Exercises  -3 lb Weight Slow, Controlled 5 sec hold exercises for shoulder flexion, shoulder abduction, external rotation 2 sets of 10 reps each  -Green Flexbar Exercises for pronation, supination, wrist flexion, wrist extension 2 sets of 10 rep each 5 sec hold  -40 lb  Bar 2 sets of 10 reps each 5 sec hold  -Red Digiflex Individual Digit 1 set of 10 reps each  -Green  Clip 3-Point Pinch 5 sec hold 1 set of 10 reps each -Dowel Josesito AAROM Shoulder Flexion, Shoulder Abduction, External Rotation exercises 1 set of 10 reps each bilaterally  -Red Theraband Exercises for shoulder flexion, shoulder abduction, shoulder external rotation 2 sets of 5 reps each 10 sec hold  -Green Flexbar Exercises for pronation, supination, wrist extension, wrist flexion 2 sets of 5 reps each 10 sec hold  - Bar 45 lbs 2 sets of 5 reps 10 sec hold each bilaterally   Therapeutic Exercise Min 15 45 40   Eval Min 30     Total Timed Procedures 15 45 40    Total Service Procedures 45 45 45   Total Time 45 45 45       Multiple values from one day are sorted in reverse-chronological order         HEP   -Shoulder ROM and Warm-Up Stretches  -Red Theraband Shoulder Strengthening Exercises  -Ergonomic Desk Checklist     Charges     TEx3

## 2025-06-10 PROBLEM — M47.817 FACET ARTHRITIS OF LUMBOSACRAL REGION: Status: ACTIVE | Noted: 2025-05-27

## 2025-06-10 PROBLEM — H81.10 VERTIGO, BENIGN PAROXYSMAL, UNSPECIFIED LATERALITY: Status: RESOLVED | Noted: 2020-09-15 | Resolved: 2025-06-10

## 2025-06-12 ENCOUNTER — APPOINTMENT (OUTPATIENT)
Dept: OCCUPATIONAL MEDICINE | Age: 50
End: 2025-06-12
Attending: FAMILY MEDICINE
Payer: COMMERCIAL

## 2025-06-13 ENCOUNTER — PATIENT MESSAGE (OUTPATIENT)
Dept: FAMILY MEDICINE CLINIC | Facility: CLINIC | Age: 50
End: 2025-06-13

## 2025-06-13 DIAGNOSIS — J01.90 ACUTE SINUSITIS, RECURRENCE NOT SPECIFIED, UNSPECIFIED LOCATION: Primary | ICD-10-CM

## 2025-06-16 ENCOUNTER — APPOINTMENT (OUTPATIENT)
Dept: OCCUPATIONAL MEDICINE | Age: 50
End: 2025-06-16
Attending: FAMILY MEDICINE
Payer: COMMERCIAL

## 2025-06-17 ENCOUNTER — APPOINTMENT (OUTPATIENT)
Dept: PHYSICAL THERAPY | Age: 50
End: 2025-06-17
Attending: FAMILY MEDICINE
Payer: COMMERCIAL

## 2025-06-18 ENCOUNTER — PATIENT MESSAGE (OUTPATIENT)
Facility: CLINIC | Age: 50
End: 2025-06-18

## 2025-06-18 ENCOUNTER — OFFICE VISIT (OUTPATIENT)
Dept: OCCUPATIONAL MEDICINE | Facility: HOSPITAL | Age: 50
End: 2025-06-18
Attending: FAMILY MEDICINE
Payer: COMMERCIAL

## 2025-06-18 ENCOUNTER — ORDER TRANSCRIPTION (OUTPATIENT)
Dept: PHYSICAL THERAPY | Facility: HOSPITAL | Age: 50
End: 2025-06-18

## 2025-06-18 DIAGNOSIS — R26.9 GAIT DISORDER: Primary | ICD-10-CM

## 2025-06-18 PROCEDURE — 97110 THERAPEUTIC EXERCISES: CPT

## 2025-06-18 NOTE — PROGRESS NOTES
Patient: Arron Gutierrez (50 year old, male) Referring Provider:  Insurance:   Diagnosis: Myalgia (M79.10)  Neuromuscular disease (HCC) (G70.9) Jorge CANO   Date of Surgery: n/a Next MD visit:  N/A   Precautions:  Fall Risk TBD Referral Information:   Date of Injury: n/a Date of Evaluation: Req: 0, Auth: 0, Exp:     05/28/25 POC Auth Visits:  12       Today's Date   6/18/2025    Subjective  Patient notes no significant changes since last visit. Completing home exercises. Dorsal R wrist at times painful with movement and patient has reached out to physician for analysis.       Pain: 3/10     Objective       Observation:   Nine-Hole Peg Test: R=24.94 sec; L=28.09 sec  Finger-to-Nose Test: WFL     ROM and Strength:  BUE AROM WFL except noted below  Shoulder   ROM MMT (-/5)    R L R L     Flex 120 120 4/5 4/5     Ext 60 60 4+/5 4+/5     Abd (C5) 90 120 4/5 4/5     IR 60 60 4/5 4/5     ER 35 40 4/5 4/5        Elbow   ROM MMT (-/5)    R L R L     Flex (C6) 135 135 5/5 4+/5     Ext (C7) 0 0 5/5 5/5     Pronation 90 90 4+/5 4+/5     Supination 90 90 4+/5 4+/5      Wrist   ROM MMT (-/5)    R L R L     Flex (C7) 65 65 4+/5 4+/5     Ext (C6) 60 60 4+/5 4+/5     Ulnar Dev 40 40 4+/5 4+/5     Radial Dev 25 25 4+/5 4+/5        Hand Strength (lbs) R L      103.2 82.8     2 pt Pinch 11 10     3 pt Pinch 20 20     Lateral pinch 21 20            Assessment  Patient demonstrating progressive improvements in terms of BUE AROM and strength. Focus on targeted eccentric strengthening, slow and controlled exercises. Patient responded well with no noted muscle spasms during exercises today. Patient would benefit from continued OT services at this time.    Goals (to be met in 12 visits)   1.Patient will demonstrate full AROM for bilateral shoulder flexion and abduction indicating increased ability to manage functional ADL/IADLs.  2. Patient will demonstrate 5/5 overall bilateral shoulder strength to facilitate  recovery.  3. Patient will demonstrate at least 25.0 sec or less for R Nine-Hole Peg Test indicating increased ability to manage functional ADL/IADLs.  4. Patient will demonstrate at least 5.0 lbs increase in bilateral  strength to facilitate recovery.  5. Patient will be independent with HEP to facilitate recovery.                 Plan  Patient will be seen 2 x/week or a total of 12 visits over a 90 day period. Treatment will include: Manual Therapy; Neuromuscular Re-education; Self-Care Home Management; Therapeutic Activities; Therapeutic Exercise; Home Exercise Program instruction; Electrical stimulation (attended); Ultrasound; Other (use comment) (Orthotics, Modaltiies PRN)    Treatment Last 4 Visits        5/28/2025 6/2/2025 6/9/2025 6/18/2025   OT Treatment   Treatment Day  2 3    3 4   Therapeutic Exercise -Shoulder ROM and Warm-Up Stretches  -Red Theraband Shoulder Strengthening Exercises  -Ergonomic Desk Checklist   -AROM Shoulder Flexion, Abduction, External Rotation Dowel Josesito Exercises  -3 lb Weight Slow, Controlled 5 sec hold exercises for shoulder flexion, shoulder abduction, external rotation 2 sets of 10 reps each  -Green Flexbar Exercises for pronation, supination, wrist flexion, wrist extension 2 sets of 10 rep each 5 sec hold  -40 lb  Bar 2 sets of 10 reps each 5 sec hold  -Red Digiflex Individual Digit 1 set of 10 reps each  -Green  Clip 3-Point Pinch 5 sec hold 1 set of 10 reps each -Dowel Josesito AAROM Shoulder Flexion, Shoulder Abduction, External Rotation exercises 1 set of 10 reps each bilaterally  -Red Theraband Exercises for shoulder flexion, shoulder abduction, shoulder external rotation 2 sets of 5 reps each 10 sec hold  -Green Flexbar Exercises for pronation, supination, wrist extension, wrist flexion 2 sets of 5 reps each 10 sec hold  - Bar 45 lbs 2 sets of 5 reps 10 sec hold each bilaterally -Dowel Josesito AAROM Shoulder Flexion, Shoulder Abduction, External Rotation exercises  1 set of 10 reps each bilaterally   -Red Theraband Exercises for shoulder flexion, shoulder abduction, shoulder external rotation 2 sets of 5 reps each 10 sec hold   -Green Flexbar Exercises for pronation, supination, wrist extension, wrist flexion 2 sets of 5 reps each 10 sec hold   - Bar 45 lbs 2 sets of 5 reps 10 sec hold each bilaterally      Therapeutic Exercise Min 15 45 40 45   Eval Min 30      Total Timed Procedures 15 45 40 45   Total Service Procedures 45 45 45 45   Total Time 45 45 45 45       Multiple values from one day are sorted in reverse-chronological order         HEP   -Shoulder ROM and Warm-Up Stretches  -Red Theraband Shoulder Strengthening Exercises  -Ergonomic Desk Checklist     Charges     TEx3

## 2025-06-18 NOTE — TELEPHONE ENCOUNTER
LOV 4/30/25: Assessment:     Chronic left shoulder labral tear and subacromial bursitis  Left shoulder osteoarthritis with associated inflammation  Chronic pain syndrome under current neurologic and pain specialist management

## 2025-06-19 ENCOUNTER — APPOINTMENT (OUTPATIENT)
Dept: OCCUPATIONAL MEDICINE | Age: 50
End: 2025-06-19
Attending: FAMILY MEDICINE
Payer: COMMERCIAL

## 2025-06-19 ENCOUNTER — TELEPHONE (OUTPATIENT)
Dept: ORTHOPEDICS CLINIC | Facility: CLINIC | Age: 50
End: 2025-06-19

## 2025-06-19 DIAGNOSIS — M25.531 BILATERAL WRIST PAIN: Primary | ICD-10-CM

## 2025-06-19 DIAGNOSIS — M25.532 BILATERAL WRIST PAIN: Primary | ICD-10-CM

## 2025-06-19 NOTE — TELEPHONE ENCOUNTER
Xray ordered per Ortho protocol, Xray scheduled.  Varioptic message sent to patient to arrive 15 - 20 min early to complete imaging.

## 2025-06-23 ENCOUNTER — HOSPITAL ENCOUNTER (OUTPATIENT)
Dept: GENERAL RADIOLOGY | Age: 50
Discharge: HOME | End: 2025-06-23
Attending: FAMILY MEDICINE
Payer: COMMERCIAL

## 2025-06-23 ENCOUNTER — APPOINTMENT (OUTPATIENT)
Dept: OCCUPATIONAL MEDICINE | Age: 50
End: 2025-06-23
Attending: FAMILY MEDICINE
Payer: COMMERCIAL

## 2025-06-23 ENCOUNTER — OFFICE VISIT (OUTPATIENT)
Facility: CLINIC | Age: 50
End: 2025-06-23
Payer: COMMERCIAL

## 2025-06-23 VITALS — WEIGHT: 183 LBS | BODY MASS INDEX: 23.49 KG/M2 | HEIGHT: 74 IN

## 2025-06-23 DIAGNOSIS — M25.531 BILATERAL WRIST PAIN: ICD-10-CM

## 2025-06-23 DIAGNOSIS — M25.532 BILATERAL WRIST PAIN: ICD-10-CM

## 2025-06-23 DIAGNOSIS — M24.9 DERANGEMENT OF RIGHT WRIST JOINT: Primary | ICD-10-CM

## 2025-06-23 DIAGNOSIS — M24.9 DERANGEMENT OF LEFT WRIST JOINT: ICD-10-CM

## 2025-06-23 PROCEDURE — 99214 OFFICE O/P EST MOD 30 MIN: CPT | Performed by: FAMILY MEDICINE

## 2025-06-23 PROCEDURE — 73110 X-RAY EXAM OF WRIST: CPT | Performed by: FAMILY MEDICINE

## 2025-06-23 RX ORDER — TRAMADOL HYDROCHLORIDE 50 MG/1
TABLET ORAL
Qty: 20 TABLET | Refills: 1 | Status: SHIPPED | OUTPATIENT
Start: 2025-06-23

## 2025-06-23 NOTE — H&P
Sports Medicine Clinic Note    Subjective:    Chief Complaint: Left wrist pain    Date of Injury: Second week of May 2025    History: 50-year-old male reports persistent left wrist pain localized to the ulnar fovea since a fall in early May. Symptoms have improved marginally but remain unresolved, particularly with aggravation during occupational therapy exercises involving resistance bands. He notes a history of similar left wrist issues. Tramadol provides partial relief during acute flares. He has been using wrist straps for support. Denies recent trauma to the right wrist. Also has a remote history of shoulder injuries from a prior fall, treated with injections.    Objective:    Bilateral Wrist Examination:    Inspection: No visible deformity, erythema, or swelling.  Palpation: Tenderness localized to the ulnar fovea.  Range of Motion: Mild limitation in flexion and extension secondary to pain; ulnar deviation exacerbates discomfort.  Neurovascular: Intact sensation and perfusion distally. Capillary refill within normal limits.  Special Tests: Positive ulnar fovea sign. Positive TFCC load test. No instability with piano key test. Negative Mason’s and grind tests.    Diagnostic Tests:    Radiographs of the left wrist personally reviewed and demonstrate normal joint spaces without fracture, dislocation, or soft tissue abnormality.    Radiographs of the right wrist reviewed and show a cortical cancellous fragment on the lateral view consistent with an age-indeterminate triquetral fracture. Mild soft tissue swelling is present.    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  Therapy: Continue occupational therapy  with modifications to avoid exacerbating maneuvers.  Medications: Refill tramadol for acute pain flares. NSAIDs as needed.  Bracing/Casting: Continue use of wrist straps for support.  Procedures: Pending MRI results, potential options include corticosteroid injection or surgical intervention.  Activity Recommendations: Avoid load-bearing and resistance-based wrist activities until further evaluation.    Follow-Up: Tentatively scheduled once MRIs are completed.      Jorge Toussaint DO, CAQSM   Primary Care Sports Medicine

## 2025-06-24 ENCOUNTER — TELEPHONE (OUTPATIENT)
Facility: CLINIC | Age: 50
End: 2025-06-24

## 2025-06-24 NOTE — TELEPHONE ENCOUNTER
Office note request from [facility] [place]. Office Notes were faxed to [phone] for 6/23 office visit reference number  [id number]

## 2025-06-25 ENCOUNTER — TELEPHONE (OUTPATIENT)
Dept: ADMINISTRATIVE | Age: 50
End: 2025-06-25

## 2025-06-25 ENCOUNTER — PATIENT MESSAGE (OUTPATIENT)
Facility: CLINIC | Age: 50
End: 2025-06-25

## 2025-06-25 ENCOUNTER — APPOINTMENT (OUTPATIENT)
Dept: OCCUPATIONAL MEDICINE | Facility: HOSPITAL | Age: 50
End: 2025-06-25
Attending: FAMILY MEDICINE
Payer: COMMERCIAL

## 2025-06-25 NOTE — TELEPHONE ENCOUNTER
Status Of Auth is PENDING  Attempted to reach out to patient by phone and/or Mychart.  Insurance will not cover without approval.  Patient should reschedule.      MRI WRIST, Bilateral (DMM=66605)     [facility]  online to initiate authorization      Scheduled For: 06/27/2025    Status: pending authorization    [facility] Case Number: [id number]    [facility] Case Number: [id number]

## 2025-06-26 ENCOUNTER — APPOINTMENT (OUTPATIENT)
Dept: OCCUPATIONAL MEDICINE | Age: 50
End: 2025-06-26
Attending: FAMILY MEDICINE
Payer: COMMERCIAL

## 2025-06-26 NOTE — TELEPHONE ENCOUNTER
MRI WRIST, RIGHT (HRO=44317)     [facility] following [facility]    Authorization: [id number]  Valid: 06/24/2025-12/21/2025

## 2025-06-26 NOTE — TELEPHONE ENCOUNTER
Update     MRI WRIST, LEFT (CPT=73221)     Called [facility] spoke to nurse demetrice BELTRAN, provided additional information .  states request is authorized     Auth # [id number]  Valid 06/24/25-12/21/25     Pt notified via

## 2025-06-28 ENCOUNTER — PATIENT MESSAGE (OUTPATIENT)
Dept: SURGERY | Facility: CLINIC | Age: 50
End: 2025-06-28

## 2025-06-30 ENCOUNTER — OFFICE VISIT (OUTPATIENT)
Dept: SURGERY | Facility: CLINIC | Age: 50
End: 2025-06-30

## 2025-06-30 ENCOUNTER — PATIENT MESSAGE (OUTPATIENT)
Dept: FAMILY MEDICINE CLINIC | Facility: CLINIC | Age: 50
End: 2025-06-30

## 2025-06-30 ENCOUNTER — APPOINTMENT (OUTPATIENT)
Dept: OCCUPATIONAL MEDICINE | Age: 50
End: 2025-06-30
Attending: FAMILY MEDICINE
Payer: COMMERCIAL

## 2025-06-30 DIAGNOSIS — N40.1 BPH WITH OBSTRUCTION/LOWER URINARY TRACT SYMPTOMS: Primary | ICD-10-CM

## 2025-06-30 DIAGNOSIS — N50.9 SCROTAL LESION: ICD-10-CM

## 2025-06-30 DIAGNOSIS — N13.8 BPH WITH OBSTRUCTION/LOWER URINARY TRACT SYMPTOMS: Primary | ICD-10-CM

## 2025-06-30 PROCEDURE — 99214 OFFICE O/P EST MOD 30 MIN: CPT | Performed by: SURGERY

## 2025-06-30 RX ORDER — SULFAMETHOXAZOLE AND TRIMETHOPRIM 800; 160 MG/1; MG/1
1 TABLET ORAL 2 TIMES DAILY
Qty: 8 TABLET | Refills: 0 | Status: SHIPPED | OUTPATIENT
Start: 2025-06-30 | End: 2025-07-04

## 2025-06-30 RX ORDER — TAMSULOSIN HYDROCHLORIDE 0.4 MG/1
0.4 CAPSULE ORAL EVERY EVENING
Qty: 90 CAPSULE | Refills: 6 | Status: SHIPPED | OUTPATIENT
Start: 2025-06-30
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
## 2025-06-30 NOTE — TELEPHONE ENCOUNTER
This encounter is now closed.     RN called patient to obtain confirmation. He agreed and accepted appt today. 6/30 11 AM

## 2025-07-02 ENCOUNTER — HOSPITAL ENCOUNTER (OUTPATIENT)
Dept: MRI IMAGING | Facility: HOSPITAL | Age: 50
Discharge: HOME | End: 2025-07-02
Attending: FAMILY MEDICINE
Payer: COMMERCIAL

## 2025-07-02 ENCOUNTER — APPOINTMENT (OUTPATIENT)
Dept: OCCUPATIONAL MEDICINE | Facility: HOSPITAL | Age: 50
End: 2025-07-02
Attending: FAMILY MEDICINE
Payer: COMMERCIAL

## 2025-07-02 DIAGNOSIS — M24.9 DERANGEMENT OF RIGHT WRIST JOINT: ICD-10-CM

## 2025-07-02 DIAGNOSIS — M24.9 DERANGEMENT OF LEFT WRIST JOINT: ICD-10-CM

## 2025-07-02 PROCEDURE — 73221 MRI JOINT UPR EXTREM W/O DYE: CPT | Performed by: FAMILY MEDICINE

## 2025-07-03 ENCOUNTER — APPOINTMENT (OUTPATIENT)
Dept: OCCUPATIONAL MEDICINE | Age: 50
End: 2025-07-03
Attending: FAMILY MEDICINE

## 2025-07-08 ENCOUNTER — OFFICE VISIT (OUTPATIENT)
Facility: CLINIC | Age: 50
End: 2025-07-08
Payer: COMMERCIAL

## 2025-07-08 VITALS — HEIGHT: 74 IN | WEIGHT: 183 LBS | BODY MASS INDEX: 23.49 KG/M2

## 2025-07-08 DIAGNOSIS — M25.832 ULNAR ABUTMENT SYNDROME OF LEFT WRIST: ICD-10-CM

## 2025-07-08 DIAGNOSIS — S63.591D TFCC (TRIANGULAR FIBROCARTILAGE COMPLEX) TEAR, RIGHT, SUBSEQUENT ENCOUNTER: Primary | ICD-10-CM

## 2025-07-08 PROCEDURE — 99214 OFFICE O/P EST MOD 30 MIN: CPT | Performed by: FAMILY MEDICINE

## 2025-07-08 NOTE — PROGRESS NOTES
Sports Medicine Clinic Note    Subjective:    Chief Complaint: Bilateral wrist pain    Date of Injury: Second week of May 2025    History: 50-year-old male presents for follow-up of bilateral wrist pain and MRI findings. Left wrist pain has been chronic since a remote injury approximately 20-25 years ago and was recently exacerbated by a fall. He has experienced persistent discomfort localized to the ulnar side. Right wrist pain is of more recent onset following a fall but has also been bothersome. He has intermittently used wrist bracing and attended hand therapy, although he had a recent lapse in sessions due to scheduling issues but plans to resume next week. He has not reported current medication use for pain. No new trauma reported since the last visit.    Objective:    Bilateral Wrist Examination:    Inspection: No visible swelling, deformity, or erythema.  Palpation: Left wrist tenderness localized to the ulnar fovea. Right wrist mild tenderness over the ulnar fovea and extensor carpi ulnaris tendon sheath.  Range of Motion: Mildly limited wrist flexion and extension bilaterally due to discomfort, more pronounced on the left.  Neurovascular: Sensation and perfusion are intact bilaterally. Capillary refill is within normal limits.  Special Tests: Positive TFCC load test and ulnar fovea sign bilaterally. No instability on piano key test. Negative Mason’s and grind tests.    Diagnostic Tests:    MRI of the right wrist revealed a focal perforation of the triangular fibrocartilage near the radial attachment, with mild distal radioulnar joint effusion. Also noted were signs of extensor carpi ulnaris tendinopathy and trace tenosynovitis.    MRI of the left wrist demonstrated positive ulnar variance with subchondral marrow edema at the ulnar aspect of the lunate consistent with ulnar impaction. Attenuation of the triangular fibrocartilage near the radial attachment without full-thickness  tear.    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  Therapy: Resume occupational therapy for wrist stabilization and targeted ulnar-sided wrist pain management.  Medications: NSAIDs as needed for pain control. Consider resuming tramadol for acute flares if symptoms escalate.  Bracing/Casting: Immobilization of the right wrist with an Exos cast for 4-6 weeks. Continue left wrist bracing intermittently as needed.  Procedures: Consider corticosteroid injection for either wrist if pain persists post-immobilization. Surgical options may be discussed based on functional limitations and therapy response.  Activity Recommendations: Avoid high-impact and resistance-based wrist activities during immobilization and until symptoms stabilize.    Follow-Up: Tentatively scheduled after completion of right wrist immobilization period or sooner as clinically indicated.      Jorge Toussaint DO, CAQSM   Primary Care Sports Medicine

## 2025-07-09 ENCOUNTER — APPOINTMENT (OUTPATIENT)
Dept: OCCUPATIONAL MEDICINE | Facility: HOSPITAL | Age: 50
End: 2025-07-09
Attending: FAMILY MEDICINE
Payer: COMMERCIAL

## 2025-07-16 ENCOUNTER — OFFICE VISIT (OUTPATIENT)
Dept: OCCUPATIONAL MEDICINE | Facility: HOSPITAL | Age: 50
End: 2025-07-16
Attending: FAMILY MEDICINE
Payer: COMMERCIAL

## 2025-07-16 PROCEDURE — 97140 MANUAL THERAPY 1/> REGIONS: CPT

## 2025-07-16 PROCEDURE — 97110 THERAPEUTIC EXERCISES: CPT

## 2025-07-16 NOTE — PROGRESS NOTES
Patient: Arron Gutierrez (50 year old, male) Referring Provider:  Insurance:   Diagnosis: Myalgia (M79.10)  Neuromuscular disease (HCC) (G70.9) Jorge CANO   Date of Surgery: n/a Next MD visit:  N/A   Precautions:  Fall Risk TBD Referral Information:   Date of Injury: n/a Date of Evaluation: Req: 0, Auth: 0, Exp:     05/28/25 POC Auth Visits:  12       Today's Date   7/16/2025    Subjective  Patient saw Dr. Toussaint for follow-up of wrist MRIs showing TFCC injury bilaterally (LUE old injury, RUE since fall injury in May 2025). Assisted patient with bilateral wrist TFCC injury management today.       Pain: 3/10     Objective       Observation:   Nine-Hole Peg Test: R=24.94 sec; L=28.09 sec  Finger-to-Nose Test: WFL     ROM and Strength:  BUE AROM WFL except noted below  Shoulder   ROM MMT (-/5)    R L R L     Flex 120 120 4/5 4/5     Ext 60 60 4+/5 4+/5     Abd (C5) 90 120 4/5 4/5     IR 60 60 4/5 4/5     ER 35 40 4/5 4/5        Elbow   ROM MMT (-/5)    R L R L     Flex (C6) 135 135 5/5 4+/5     Ext (C7) 0 0 5/5 5/5     Pronation 90 90 4+/5 4+/5     Supination 90 90 4+/5 4+/5      Wrist   ROM MMT (-/5)    R L R L     Flex (C7) 65 65 4+/5 4+/5     Ext (C6) 60 60 4+/5 4+/5     Ulnar Dev 40 40 4+/5 4+/5     Radial Dev 25 25 4+/5 4+/5        Hand Strength (lbs) R L      103.2 82.8     2 pt Pinch 11 10     3 pt Pinch 20 20     Lateral pinch 21 20            Assessment  Focus of today's treatment on TFCC management including patient education on bracing options (Bullseye Brace, Wrist Widget, Kinesiotape Support), ROM and gentle strengthening exercises, and use of pulsed ultrasound to R wrist to assist with healing. Patient also provided red theraputty hand strengthening exercises today.    Goals (to be met in 12 visits)   1.Patient will demonstrate full AROM for bilateral shoulder flexion and abduction indicating increased ability to manage functional ADL/IADLs.  2. Patient will demonstrate 5/5 overall  bilateral shoulder strength to facilitate recovery.  3. Patient will demonstrate at least 25.0 sec or less for R Nine-Hole Peg Test indicating increased ability to manage functional ADL/IADLs.  4. Patient will demonstrate at least 5.0 lbs increase in bilateral  strength to facilitate recovery.  5. Patient will be independent with HEP to facilitate recovery.                     Plan  Patient will be seen 2 x/week or a total of 12 visits over a 90 day period. Treatment will include: Manual Therapy; Neuromuscular Re-education; Self-Care Home Management; Therapeutic Activities; Therapeutic Exercise; Home Exercise Program instruction; Electrical stimulation (attended); Ultrasound; Other (use comment) (Orthotics, Modaltiies PRN)    Treatment Last 4 Visits        6/2/2025 6/9/2025 6/18/2025 7/16/2025   OT Treatment   Treatment Day 2 3    3 4 5    5   Therapeutic Exercise -AROM Shoulder Flexion, Abduction, External Rotation Dowel Josesito Exercises  -3 lb Weight Slow, Controlled 5 sec hold exercises for shoulder flexion, shoulder abduction, external rotation 2 sets of 10 reps each  -Green Flexbar Exercises for pronation, supination, wrist flexion, wrist extension 2 sets of 10 rep each 5 sec hold  -40 lb  Bar 2 sets of 10 reps each 5 sec hold  -Red Digiflex Individual Digit 1 set of 10 reps each  -Green  Clip 3-Point Pinch 5 sec hold 1 set of 10 reps each -Dowel Josesito AAROM Shoulder Flexion, Shoulder Abduction, External Rotation exercises 1 set of 10 reps each bilaterally  -Red Theraband Exercises for shoulder flexion, shoulder abduction, shoulder external rotation 2 sets of 5 reps each 10 sec hold  -Green Flexbar Exercises for pronation, supination, wrist extension, wrist flexion 2 sets of 5 reps each 10 sec hold  - Bar 45 lbs 2 sets of 5 reps 10 sec hold each bilaterally -Dowel Josesito AAROM Shoulder Flexion, Shoulder Abduction, External Rotation exercises 1 set of 10 reps each bilaterally   -Red Theraband Exercises  for shoulder flexion, shoulder abduction, shoulder external rotation 2 sets of 5 reps each 10 sec hold   -Green Flexbar Exercises for pronation, supination, wrist extension, wrist flexion 2 sets of 5 reps each 10 sec hold   - Bar 45 lbs 2 sets of 5 reps 10 sec hold each bilaterally    -2-3 lb Weight Wrist Strengthening Exercises  -AROM/PROM stretches for Wrist Flexion/Extension/Pronation/Supination/Circles  -Red Theraputty Strengthening Exercises   Manual Therapy    -Y-Strip Kinesiotape Support to R TFCC Wrist area w/ 50% stretch   Modalities    -Ultrasound treatment to R dorsal wrist and TFCC area w/ settings 3.3 MHz, 1.2 W/cm2, 50% duty for 5 min   Therapeutic Exercise Min 45 40 45 32   Manual Therapy Min    8   Ultrasound Min    5   Total Timed Procedures 45 40 45 45   Total Service Procedures 45 45 45 45   Total Time 45 45 45 45       Multiple values from one day are sorted in reverse-chronological order         HEP   -Shoulder ROM and Warm-Up Stretches  -Red Theraband Shoulder Strengthening Exercises  -Ergonomic Desk Checklist  -Red Theraputty Strengthening Exercises  -Wrist ROM Exercises    Charges     TEx2, MTx1

## 2025-07-19 DIAGNOSIS — G47.9 SLEEP DISTURBANCE: ICD-10-CM

## 2025-07-21 RX ORDER — TRAZODONE HYDROCHLORIDE 50 MG/1
50 TABLET ORAL NIGHTLY
Qty: 90 TABLET | Refills: 0 | Status: SHIPPED | OUTPATIENT
Start: 2025-07-21

## 2025-07-21 NOTE — TELEPHONE ENCOUNTER
Requesting Trazodone 50mg  Last OV: 5/29/25  RTC: prn  Last Rx'd 2/4/25 #90 with 0 refills    Future Appointments   Date Time Provider Department Center   10/1/2025 10:00 AM Pablito Knight MD [facility] [address] [facility] [address]       Non-protocol med:  Rx pended and routed for approval/denial

## 2025-07-23 ENCOUNTER — OFFICE VISIT (OUTPATIENT)
Dept: OCCUPATIONAL MEDICINE | Facility: HOSPITAL | Age: 50
End: 2025-07-23
Attending: FAMILY MEDICINE
Payer: COMMERCIAL

## 2025-07-23 PROCEDURE — 97110 THERAPEUTIC EXERCISES: CPT

## 2025-07-23 NOTE — PROGRESS NOTES
Patient: Arron Gutierrez (50 year old, male) Referring Provider:  Insurance:   Diagnosis: Myalgia (M79.10)  Neuromuscular disease (HCC) (G70.9) Jorge CANO   Date of Surgery: n/a Next MD visit:  N/A   Precautions:  Fall Risk TBD Referral Information:   Date of Injury: n/a Date of Evaluation: Req: 0, Auth: 0, Exp:     05/28/25 POC Auth Visits:  12       Today's Date   7/23/2025    Subjective  Patient notes that he has not been able to complete much in terms of wrist exercises. Bracing okay even on trip to [place]. Completing strengthening exercises bilaterally today with no muscle spasming noted today.       Pain: 3/10     Objective       Observation:   Nine-Hole Peg Test: R=24.94 sec; L=28.09 sec  Finger-to-Nose Test: WFL     ROM and Strength:  BUE AROM WFL except noted below  Shoulder   ROM MMT (-/5)    R L R L     Flex 120 120 4/5 4/5     Ext 60 60 4+/5 4+/5     Abd (C5) 90 120 4/5 4/5     IR 60 60 4/5 4/5     ER 35 40 4/5 4/5        Elbow   ROM MMT (-/5)    R L R L     Flex (C6) 135 135 5/5 4+/5     Ext (C7) 0 0 5/5 5/5     Pronation 90 90 4+/5 4+/5     Supination 90 90 4+/5 4+/5      Wrist   ROM MMT (-/5)    R L R L     Flex (C7) 65 65 4+/5 4+/5     Ext (C6) 60 60 4+/5 4+/5     Ulnar Dev 40 40 4+/5 4+/5     Radial Dev 25 25 4+/5 4+/5        Hand Strength (lbs) R L      103.2 82.8     2 pt Pinch 11 10     3 pt Pinch 20 20     Lateral pinch 21 20            Assessment  Focus of today's treatment back to BUE strengthening overall. Shoulder, , and pinch strengthening faciltiated today. Patient provided green theraband to continue exercises at home today.    Goals (to be met in 12 visits)   1.Patient will demonstrate full AROM for bilateral shoulder flexion and abduction indicating increased ability to manage functional ADL/IADLs.  2. Patient will demonstrate 5/5 overall bilateral shoulder strength to facilitate recovery.  3. Patient will demonstrate at least 25.0 sec or less for R  Nine-Hole Peg Test indicating increased ability to manage functional ADL/IADLs.  4. Patient will demonstrate at least 5.0 lbs increase in bilateral  strength to facilitate recovery.  5. Patient will be independent with HEP to facilitate recovery.       Plan  Patient will be seen 2 x/week or a total of 12 visits over a 90 day period. Treatment will include: Manual Therapy; Neuromuscular Re-education; Self-Care Home Management; Therapeutic Activities; Therapeutic Exercise; Home Exercise Program instruction; Electrical stimulation (attended); Ultrasound; Other (use comment) (Orthotics, Modaltiies PRN)    Treatment Last 4 Visits        6/9/2025 6/18/2025 7/16/2025 7/23/2025   OT Treatment   Treatment Day 3    3 4 5    5 6   Therapeutic Exercise -Dowel Josesito AAROM Shoulder Flexion, Shoulder Abduction, External Rotation exercises 1 set of 10 reps each bilaterally  -Red Theraband Exercises for shoulder flexion, shoulder abduction, shoulder external rotation 2 sets of 5 reps each 10 sec hold  -Green Flexbar Exercises for pronation, supination, wrist extension, wrist flexion 2 sets of 5 reps each 10 sec hold  - Bar 45 lbs 2 sets of 5 reps 10 sec hold each bilaterally -Dowel Josesito AAROM Shoulder Flexion, Shoulder Abduction, External Rotation exercises 1 set of 10 reps each bilaterally   -Red Theraband Exercises for shoulder flexion, shoulder abduction, shoulder external rotation 2 sets of 5 reps each 10 sec hold   -Green Flexbar Exercises for pronation, supination, wrist extension, wrist flexion 2 sets of 5 reps each 10 sec hold   - Bar 45 lbs 2 sets of 5 reps 10 sec hold each bilaterally    -2-3 lb Weight Wrist Strengthening Exercises  -AROM/PROM stretches for Wrist Flexion/Extension/Pronation/Supination/Circles  -Red Theraputty Strengthening Exercises -Green Theraband Strengthening Exercises for shoulder flexion, shoulder abduction, external rotation, internal rotation, standing rows 2 sets of 10 reps each  bilaterally  - Bar 35 lbs 2 sets of 10 reps each bilaterlly  -Green  Clip 3-Point Pinch 2 sets of 10 reps each bilaterally   Manual Therapy   -Y-Strip Kinesiotape Support to R TFCC Wrist area w/ 50% stretch    Modalities   -Ultrasound treatment to R dorsal wrist and TFCC area w/ settings 3.3 MHz, 1.2 W/cm2, 50% duty for 5 min    Therapeutic Exercise Min 40 45 32 45   Manual Therapy Min   8    Ultrasound Min   5    Total Timed Procedures 40 45 45 45   Total Service Procedures 45 45 45 45   Total Time 45 45 45 45       Multiple values from one day are sorted in reverse-chronological order         HEP   -Shoulder ROM and Warm-Up Stretches  -Green Theraband Shoulder Strengthening Exercises  -Ergonomic Desk Checklist  -Red Theraputty Strengthening Exercises  -Wrist ROM Exercises    Charges     TEx3

## 2025-07-25 ENCOUNTER — TELEPHONE (OUTPATIENT)
Facility: LOCATION | Age: 50
End: 2025-07-25

## 2025-07-25 ENCOUNTER — TELEPHONE (OUTPATIENT)
Facility: CLINIC | Age: 50
End: 2025-07-25

## 2025-07-25 NOTE — TELEPHONE ENCOUNTER
S/w patient- He states that he has history of toenail concerns where the nails have become loose and/or fallen off. He states that he has noticed a different nail that has come loose. He does not think that it needs to be removed, but he would like Dr Urbina to assess. He denied drainage, swelling or other signs of infection. I booked patient into opening on 8/1/25 at 11:15. He accepted and had no other concerns at this time. I educated patient that he should be assessed in UC/ER if he were to develop signs of infection.    ZOIE

## 2025-07-28 ENCOUNTER — TELEPHONE (OUTPATIENT)
Dept: PHYSICAL THERAPY | Facility: HOSPITAL | Age: 50
End: 2025-07-28

## 2025-07-29 ENCOUNTER — OFFICE VISIT (OUTPATIENT)
Dept: PHYSICAL THERAPY | Facility: HOSPITAL | Age: 50
End: 2025-07-29
Attending: Other
Payer: COMMERCIAL

## 2025-07-29 DIAGNOSIS — R26.9 GAIT DISORDER: Primary | ICD-10-CM

## 2025-07-29 PROCEDURE — 97162 PT EVAL MOD COMPLEX 30 MIN: CPT

## 2025-07-29 PROCEDURE — 97110 THERAPEUTIC EXERCISES: CPT

## 2025-07-30 ENCOUNTER — OFFICE VISIT (OUTPATIENT)
Dept: OCCUPATIONAL MEDICINE | Facility: HOSPITAL | Age: 50
End: 2025-07-30
Attending: FAMILY MEDICINE
Payer: COMMERCIAL

## 2025-07-30 ENCOUNTER — OFFICE VISIT (OUTPATIENT)
Dept: PHYSICAL THERAPY | Facility: HOSPITAL | Age: 50
End: 2025-07-30
Attending: Other
Payer: COMMERCIAL

## 2025-07-30 PROCEDURE — 97110 THERAPEUTIC EXERCISES: CPT

## 2025-07-30 PROCEDURE — 97112 NEUROMUSCULAR REEDUCATION: CPT

## 2025-07-31 ENCOUNTER — OFFICE VISIT (OUTPATIENT)
Dept: PHYSICAL THERAPY | Facility: HOSPITAL | Age: 50
End: 2025-07-31
Attending: Other
Payer: COMMERCIAL

## 2025-07-31 PROCEDURE — 97116 GAIT TRAINING THERAPY: CPT

## 2025-07-31 PROCEDURE — 97110 THERAPEUTIC EXERCISES: CPT

## 2025-07-31 PROCEDURE — 97112 NEUROMUSCULAR REEDUCATION: CPT

## 2025-08-01 ENCOUNTER — OFFICE VISIT (OUTPATIENT)
Facility: LOCATION | Age: 50
End: 2025-08-01

## 2025-08-01 DIAGNOSIS — S90.221S: ICD-10-CM

## 2025-08-01 DIAGNOSIS — B35.1 ONYCHOMYCOSIS: Primary | ICD-10-CM

## 2025-08-01 DIAGNOSIS — L60.3 NAIL DYSTROPHY: ICD-10-CM

## 2025-08-01 PROCEDURE — 99213 OFFICE O/P EST LOW 20 MIN: CPT | Performed by: PODIATRIST

## 2025-08-04 ENCOUNTER — OFFICE VISIT (OUTPATIENT)
Dept: PHYSICAL THERAPY | Facility: HOSPITAL | Age: 50
End: 2025-08-04
Attending: Other

## 2025-08-04 PROCEDURE — 97112 NEUROMUSCULAR REEDUCATION: CPT

## 2025-08-04 PROCEDURE — 97116 GAIT TRAINING THERAPY: CPT

## 2025-08-04 PROCEDURE — 97110 THERAPEUTIC EXERCISES: CPT

## 2025-08-05 ENCOUNTER — OFFICE VISIT (OUTPATIENT)
Facility: CLINIC | Age: 50
End: 2025-08-05

## 2025-08-05 ENCOUNTER — OFFICE VISIT (OUTPATIENT)
Dept: PHYSICAL THERAPY | Facility: HOSPITAL | Age: 50
End: 2025-08-05
Attending: Other

## 2025-08-05 VITALS — WEIGHT: 183 LBS | HEIGHT: 74 IN | BODY MASS INDEX: 23.49 KG/M2

## 2025-08-05 DIAGNOSIS — S63.591D TFCC (TRIANGULAR FIBROCARTILAGE COMPLEX) TEAR, RIGHT, SUBSEQUENT ENCOUNTER: Primary | ICD-10-CM

## 2025-08-05 DIAGNOSIS — M25.832 ULNAR ABUTMENT SYNDROME OF LEFT WRIST: ICD-10-CM

## 2025-08-05 DIAGNOSIS — G89.29 CHRONIC MUSCULOSKELETAL PAIN: ICD-10-CM

## 2025-08-05 DIAGNOSIS — M75.102 ROTATOR CUFF SYNDROME OF LEFT SHOULDER: ICD-10-CM

## 2025-08-05 DIAGNOSIS — S43.432D GLENOID LABRAL TEAR, LEFT, SUBSEQUENT ENCOUNTER: ICD-10-CM

## 2025-08-05 DIAGNOSIS — M79.18 CHRONIC MUSCULOSKELETAL PAIN: ICD-10-CM

## 2025-08-05 DIAGNOSIS — M19.012 ARTHROSIS OF LEFT ACROMIOCLAVICULAR JOINT: ICD-10-CM

## 2025-08-05 PROCEDURE — 97112 NEUROMUSCULAR REEDUCATION: CPT

## 2025-08-05 PROCEDURE — 20550 NJX 1 TENDON SHEATH/LIGAMENT: CPT | Performed by: FAMILY MEDICINE

## 2025-08-05 PROCEDURE — 97110 THERAPEUTIC EXERCISES: CPT

## 2025-08-05 PROCEDURE — 97116 GAIT TRAINING THERAPY: CPT

## 2025-08-05 PROCEDURE — 76942 ECHO GUIDE FOR BIOPSY: CPT | Performed by: FAMILY MEDICINE

## 2025-08-05 PROCEDURE — 99214 OFFICE O/P EST MOD 30 MIN: CPT | Performed by: FAMILY MEDICINE

## 2025-08-05 RX ORDER — TRIAMCINOLONE ACETONIDE 40 MG/ML
40 INJECTION, SUSPENSION INTRA-ARTICULAR; INTRAMUSCULAR ONCE
Status: COMPLETED | OUTPATIENT
Start: 2025-08-05 | End: 2025-08-06

## 2025-08-06 ENCOUNTER — OFFICE VISIT (OUTPATIENT)
Dept: PHYSICAL THERAPY | Facility: HOSPITAL | Age: 50
End: 2025-08-06
Attending: Other

## 2025-08-06 ENCOUNTER — OFFICE VISIT (OUTPATIENT)
Dept: OCCUPATIONAL MEDICINE | Facility: HOSPITAL | Age: 50
End: 2025-08-06
Attending: FAMILY MEDICINE

## 2025-08-06 PROCEDURE — 97112 NEUROMUSCULAR REEDUCATION: CPT

## 2025-08-06 PROCEDURE — 97110 THERAPEUTIC EXERCISES: CPT

## 2025-08-06 PROCEDURE — 97116 GAIT TRAINING THERAPY: CPT

## 2025-08-06 RX ORDER — CELECOXIB 200 MG/1
200 CAPSULE ORAL 2 TIMES DAILY
Qty: 180 CAPSULE | Refills: 1 | Status: SHIPPED | OUTPATIENT
Start: 2025-08-06

## 2025-08-06 RX ADMIN — TRIAMCINOLONE ACETONIDE 40 MG: 40 INJECTION, SUSPENSION INTRA-ARTICULAR; INTRAMUSCULAR at 09:28:00

## 2025-08-07 ENCOUNTER — OFFICE VISIT (OUTPATIENT)
Dept: PHYSICAL THERAPY | Facility: HOSPITAL | Age: 50
End: 2025-08-07
Attending: Other

## 2025-08-07 PROCEDURE — 97116 GAIT TRAINING THERAPY: CPT

## 2025-08-07 PROCEDURE — 97112 NEUROMUSCULAR REEDUCATION: CPT

## 2025-08-07 PROCEDURE — 97110 THERAPEUTIC EXERCISES: CPT

## 2025-08-11 ENCOUNTER — OFFICE VISIT (OUTPATIENT)
Dept: PHYSICAL THERAPY | Facility: HOSPITAL | Age: 50
End: 2025-08-11
Attending: Other

## 2025-08-11 PROCEDURE — 97116 GAIT TRAINING THERAPY: CPT

## 2025-08-11 PROCEDURE — 97110 THERAPEUTIC EXERCISES: CPT

## 2025-08-11 PROCEDURE — 97112 NEUROMUSCULAR REEDUCATION: CPT

## 2025-08-12 ENCOUNTER — APPOINTMENT (OUTPATIENT)
Dept: PHYSICAL THERAPY | Facility: HOSPITAL | Age: 50
End: 2025-08-12
Attending: Other

## 2025-08-13 ENCOUNTER — OFFICE VISIT (OUTPATIENT)
Dept: PHYSICAL THERAPY | Facility: HOSPITAL | Age: 50
End: 2025-08-13
Attending: Other

## 2025-08-13 ENCOUNTER — OFFICE VISIT (OUTPATIENT)
Dept: OCCUPATIONAL MEDICINE | Facility: HOSPITAL | Age: 50
End: 2025-08-13
Attending: FAMILY MEDICINE

## 2025-08-13 PROCEDURE — 97116 GAIT TRAINING THERAPY: CPT

## 2025-08-13 PROCEDURE — 97110 THERAPEUTIC EXERCISES: CPT

## 2025-08-13 PROCEDURE — 97112 NEUROMUSCULAR REEDUCATION: CPT

## 2025-08-14 ENCOUNTER — OFFICE VISIT (OUTPATIENT)
Dept: PHYSICAL THERAPY | Facility: HOSPITAL | Age: 50
End: 2025-08-14
Attending: Other

## 2025-08-14 PROCEDURE — 97110 THERAPEUTIC EXERCISES: CPT

## 2025-08-14 PROCEDURE — 97116 GAIT TRAINING THERAPY: CPT

## 2025-08-14 PROCEDURE — 97112 NEUROMUSCULAR REEDUCATION: CPT

## 2025-08-18 ENCOUNTER — OFFICE VISIT (OUTPATIENT)
Dept: PHYSICAL THERAPY | Facility: HOSPITAL | Age: 50
End: 2025-08-18
Attending: Other

## 2025-08-18 PROCEDURE — 97116 GAIT TRAINING THERAPY: CPT

## 2025-08-18 PROCEDURE — 97112 NEUROMUSCULAR REEDUCATION: CPT

## 2025-08-18 PROCEDURE — 97110 THERAPEUTIC EXERCISES: CPT

## 2025-08-19 ENCOUNTER — HOSPITAL ENCOUNTER (OUTPATIENT)
Dept: GENERAL RADIOLOGY | Age: 50
Discharge: HOME | End: 2025-08-19
Attending: FAMILY MEDICINE

## 2025-08-19 ENCOUNTER — OFFICE VISIT (OUTPATIENT)
Facility: CLINIC | Age: 50
End: 2025-08-19

## 2025-08-19 ENCOUNTER — OFFICE VISIT (OUTPATIENT)
Dept: PHYSICAL THERAPY | Facility: HOSPITAL | Age: 50
End: 2025-08-19
Attending: Other

## 2025-08-19 VITALS — BODY MASS INDEX: 23.49 KG/M2 | WEIGHT: 183 LBS | HEIGHT: 74 IN

## 2025-08-19 DIAGNOSIS — M25.562 PAIN IN BOTH KNEES, UNSPECIFIED CHRONICITY: Primary | ICD-10-CM

## 2025-08-19 DIAGNOSIS — M25.562 PAIN IN BOTH KNEES, UNSPECIFIED CHRONICITY: ICD-10-CM

## 2025-08-19 DIAGNOSIS — M25.561 PAIN IN BOTH KNEES, UNSPECIFIED CHRONICITY: ICD-10-CM

## 2025-08-19 DIAGNOSIS — M25.561 PAIN IN BOTH KNEES, UNSPECIFIED CHRONICITY: Primary | ICD-10-CM

## 2025-08-19 DIAGNOSIS — M17.11 PRIMARY OSTEOARTHRITIS OF RIGHT KNEE: ICD-10-CM

## 2025-08-19 DIAGNOSIS — M17.12 PRIMARY OSTEOARTHRITIS OF LEFT KNEE: ICD-10-CM

## 2025-08-19 PROCEDURE — 99214 OFFICE O/P EST MOD 30 MIN: CPT | Performed by: FAMILY MEDICINE

## 2025-08-19 PROCEDURE — 73564 X-RAY EXAM KNEE 4 OR MORE: CPT | Performed by: FAMILY MEDICINE

## 2025-08-19 PROCEDURE — 97116 GAIT TRAINING THERAPY: CPT

## 2025-08-19 PROCEDURE — 97110 THERAPEUTIC EXERCISES: CPT

## 2025-08-19 PROCEDURE — 97112 NEUROMUSCULAR REEDUCATION: CPT

## 2025-08-20 ENCOUNTER — OFFICE VISIT (OUTPATIENT)
Dept: PHYSICAL THERAPY | Facility: HOSPITAL | Age: 50
End: 2025-08-20
Attending: Other

## 2025-08-20 PROCEDURE — 97112 NEUROMUSCULAR REEDUCATION: CPT

## 2025-08-20 PROCEDURE — 97116 GAIT TRAINING THERAPY: CPT

## 2025-08-20 PROCEDURE — 97110 THERAPEUTIC EXERCISES: CPT

## 2025-08-21 ENCOUNTER — OFFICE VISIT (OUTPATIENT)
Dept: PHYSICAL THERAPY | Facility: HOSPITAL | Age: 50
End: 2025-08-21
Attending: Other

## 2025-08-21 PROCEDURE — 97110 THERAPEUTIC EXERCISES: CPT

## 2025-08-21 PROCEDURE — 97116 GAIT TRAINING THERAPY: CPT

## 2025-08-21 PROCEDURE — 97112 NEUROMUSCULAR REEDUCATION: CPT

## 2025-08-25 ENCOUNTER — ORDER TRANSCRIPTION (OUTPATIENT)
Dept: PHYSICAL THERAPY | Facility: HOSPITAL | Age: 50
End: 2025-08-25

## 2025-08-25 ENCOUNTER — PATIENT MESSAGE (OUTPATIENT)
Facility: CLINIC | Age: 50
End: 2025-08-25

## 2025-08-25 ENCOUNTER — OFFICE VISIT (OUTPATIENT)
Dept: PHYSICAL THERAPY | Facility: HOSPITAL | Age: 50
End: 2025-08-25
Attending: Other

## 2025-08-25 DIAGNOSIS — M47.816 LUMBAR ARTHROPATHY: ICD-10-CM

## 2025-08-25 DIAGNOSIS — M54.2 CERVICALGIA: Primary | ICD-10-CM

## 2025-08-25 PROCEDURE — 97112 NEUROMUSCULAR REEDUCATION: CPT

## 2025-08-26 ENCOUNTER — OFFICE VISIT (OUTPATIENT)
Facility: CLINIC | Age: 50
End: 2025-08-26

## 2025-08-26 VITALS — WEIGHT: 178 LBS | HEIGHT: 74 IN | BODY MASS INDEX: 22.84 KG/M2

## 2025-08-26 DIAGNOSIS — M17.11 PRIMARY OSTEOARTHRITIS OF RIGHT KNEE: ICD-10-CM

## 2025-08-26 DIAGNOSIS — M17.12 PRIMARY OSTEOARTHRITIS OF LEFT KNEE: Primary | ICD-10-CM

## 2025-08-26 PROCEDURE — 20610 DRAIN/INJ JOINT/BURSA W/O US: CPT | Performed by: FAMILY MEDICINE

## 2025-08-26 PROCEDURE — 99214 OFFICE O/P EST MOD 30 MIN: CPT | Performed by: FAMILY MEDICINE

## 2025-08-26 RX ORDER — TERIPARATIDE 250 UG/ML
INJECTION, SOLUTION SUBCUTANEOUS
COMMUNITY
Start: 2025-08-07

## 2025-08-26 RX ORDER — METHYLPREDNISOLONE 4 MG/1
TABLET ORAL
Qty: 1 EACH | Refills: 0 | Status: SHIPPED | OUTPATIENT
Start: 2025-08-26

## 2025-08-27 ENCOUNTER — OFFICE VISIT (OUTPATIENT)
Dept: OCCUPATIONAL MEDICINE | Facility: HOSPITAL | Age: 50
End: 2025-08-27
Attending: FAMILY MEDICINE

## 2025-08-27 PROCEDURE — 97110 THERAPEUTIC EXERCISES: CPT

## (undated) DIAGNOSIS — K59.09 CHRONIC CONSTIPATION: Primary | ICD-10-CM

## (undated) DIAGNOSIS — R10.9 ABDOMINAL PAIN, UNSPECIFIED ABDOMINAL LOCATION: ICD-10-CM

## (undated) DIAGNOSIS — R20.2 PARESTHESIAS: Primary | ICD-10-CM

## (undated) DIAGNOSIS — M25.611 DECREASED RANGE OF MOTION OF RIGHT SHOULDER: Primary | ICD-10-CM

## (undated) DIAGNOSIS — G62.9 NEUROPATHY: ICD-10-CM

## (undated) DEVICE — LIGHT HANDLE

## (undated) DEVICE — GLOVE SUR 6.5 SENSICARE PIP WHT PWD F

## (undated) DEVICE — BULB SYRINGE,IRRIGATION WITH PROTECTIVE CAP: Brand: DOVER

## (undated) DEVICE — STERILE POLYISOPRENE POWDER-FREE SURGICAL GLOVES: Brand: PROTEXIS

## (undated) DEVICE — MARKER SKIN PREP RESIST STRL

## (undated) DEVICE — PAIN TRAY: Brand: MEDLINE INDUSTRIES, INC.

## (undated) DEVICE — CRANIOTOMY CDS: Brand: MEDLINE INDUSTRIES, INC.

## (undated) DEVICE — FLOSEAL HEMOSTATIC MATRIX, 5ML: Brand: FLOSEAL HEMOSTATIC MATRIX

## (undated) DEVICE — SUTURE VICRYL PLUS 4-0 PS-2

## (undated) DEVICE — SKIN REG/FINE DUAL MARKER, RULER, LABELS: Brand: MEDLINE

## (undated) DEVICE — BANDAGE ADH 1INX3IN NAT FAB N ADH PD CURAD

## (undated) DEVICE — GOWN,SIRUS,FABRIC-REINFORCED,X-LARGE: Brand: MEDLINE

## (undated) DEVICE — NEEDLE SPNL 22GA L3.5IN BLK QNCKE STYL DISP

## (undated) DEVICE — DRILL SRG OIL CRTDG MAESTRO

## (undated) DEVICE — DRAPE MICROSCOPE NEURO PENTERO

## (undated) DEVICE — GLOVE SUR 7.5 SENSICARE PIP WHT PWD F

## (undated) DEVICE — SOL  .9 1000ML BTL

## (undated) DEVICE — GLOVE,SURG,SENSICARE,ALOE,LF,PF,7: Brand: MEDLINE

## (undated) DEVICE — Device: Brand: INTELLICART™

## (undated) DEVICE — SUTURE VICRYL 2-0 CP-2

## (undated) DEVICE — SUTURE VICRYL 0 CT-2

## (undated) DEVICE — KENDALL SCD EXPRESS SLEEVES, KNEE LENGTH, MEDIUM: Brand: KENDALL SCD

## (undated) DEVICE — SUTURE NUROLON 4-0 TF

## (undated) DEVICE — SUTURE VICRYL 2-0 CT-2

## (undated) DEVICE — TIDISHIELD SURGICAL PATIENT DRAPE WITH INVASIVE APERTURES BLUE STERILE UNIVERSAL NAVIGATIONAL CRANIOTOMY 8 PER CASE: Brand: TIDISHIELD

## (undated) DEVICE — MARKER PASSIVE NAVIGATION

## (undated) DEVICE — IRRIGATION SUCTION CASSETTE: Brand: SONOPET IQ

## (undated) DEVICE — D-58 TAPERED ROUTER

## (undated) DEVICE — PAD SACRAL SPAN AID

## (undated) DEVICE — CODMAN® SURGICAL PATTIES 1/2" X 1/2" (1.27CM X 1.27CM): Brand: CODMAN®

## (undated) DEVICE — REMOVER LOT 4OZ N IRRIG UNSCNT SFT MOIST LIQ

## (undated) DEVICE — CODMAN® INTEGRATED BIPOLAR CORD AND TUBING SET FLYING LEADS, ROTARY PUMP: Brand: CODMAN®

## (undated) DEVICE — PREMIUM WET SKIN PREP TRAY: Brand: MEDLINE INDUSTRIES, INC.

## (undated) DEVICE — CODMAN® DISPOSABLE PERFORATOR 14MM: Brand: CODMAN®

## (undated) DEVICE — SOL  .9 1000ML BAG

## (undated) NOTE — LETTER
[facility]   Date:   4/27/2022     Name:   Lou Osorio    YOB: 1975   MRN:   [id number]       WHERE IS YOUR PAIN NOW? Carlos the areas on your body where you feel the described sensations. Use the appropriate symbol. Rona Cordova the areas of radiation. Include all affected areas. Just to complete the picture, please draw in the face. ACHE:  ^ ^ ^   NUMBNESS:  0000   PINS & NEEDLES:  = = = =                              ^ ^ ^                       0000              = = = =                                    ^ ^ ^                       0000            = = = =      BURNING:  XXXX   STABBING: ////                  XXXX                ////                         XXXX          ////     Please carlos the line below indicating your degree of pain right now  with 0 being no pain 10 being the worst pain possible.                                          0             1             2              3             4              5              6              7             8             9             10         Patient Signature:

## (undated) NOTE — LETTER
[facility], [address] [place] [address]   Date:   1/3/2024     Name:   Arron Gutierrez    YOB: 1975   MRN:   [id number]       WHERE IS YOUR PAIN NOW?  Jessica the areas on your body where you feel the described sensations.  Use the appropriate symbol.  Jessica the areas of radiation.  Include all affected areas.  Just to complete the picture, please draw in the face.     ACHE:  ^ ^ ^   NUMBNESS:  0000   PINS & NEEDLES:  = = = =                              ^ ^ ^                       0000              = = = =                                    ^ ^ ^                       0000            = = = =      BURNING:  XXXX   STABBING: ////                  XXXX                ////                         XXXX          ////     Please jessica the line below indicating your degree of pain right now  with 0 being no pain 10 being the worst pain possible.                                         0             1             2              3             4              5              6              7             8             9             10         Patient Signature:

## (undated) NOTE — LETTER
10/22/22    Patient: Isael Vaughn  : 1975 Visit date: 10/20/2022    Dear  Andres Clarke MD    Thank you for referring Isael Vaughn to my practice. Please find my assessment and plan below. History of Present Illness   Alis is a 25-year-old male who presents to clinic for continued care and evaluation of a chronic skin lesion left inguinal crease. The patient was scheduled for surgery this previous Monday but canceled due to delayed procedure. The patient now reports he would no longer wish to proceed with surgery. He reports a skin lesion is no longer causing excessive discomfort. The patient has a complicated past medical history. He was diagnosed with epilepsy after multiple petit mall seizures in his teens. The patient had been placed on phenytoin for treatment. He had breakthrough seizures over the last few years and his dose was progressively increased. The patient was subsequently diagnosed with phenytoin toxicity. 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 After successful resection of this benign tumor, the patient seizures resolved. Since he was diagnosed with phenytoin toxicity, the patient reports multiple skin changes including recurrent skin lesions, difficulty healing dry skin and blisters. The patient continues to undergo ongoing work-up for intracranial and spinal cord complications. The patient reports that the previously noted skin lesion in the left inguinal crease has regressed. He states that there is a residual discoloration in the skin in that area but this is nontender and asymptomatic. No indication for surgical intervention at this time.   The patient will follow-up with me as needed        Assessment  Inguinal mass  (primary encounter diagnosis)        Plan     No indication for surgical intervention for skin lesion which has regressed spontaneously. The patient will follow-up with me as needed.              Sincerely,       Dyllan Tucker MD   CC: No Recipients

## (undated) NOTE — LETTER
Patient Name: Dilma Felix  : 1975  MRN: [id number]  Patient Address: [address] [place] [address] Dr Lemar Barthel [id number]      Coronavirus Disease 2019 (COVID-19)     [facility] is committed to the safety and well-being of our patients, members, carefully. If your symptoms get worse, call your healthcare provider immediately. 3. Get rest and stay hydrated.    4. If you have a medical appointment, call the healthcare provider ahead of time and tell them that you have or may have COVID-19.  5. For m of fever-reducing medications; and  · Improvement in respiratory symptoms (e.g., cough, shortness of breath); and  · At least 10 days have passed since symptoms first appeared OR if asymptomatic patient or date of symptom onset is unclear then use 10 days donors must:    · Have had a confirmed diagnosis of COVID-19  · Be symptom-free for at least 14 days*    *Some people will be required to have a repeat COVID-19 test in order to be eligible to donate.  If you’re instructed by Cody that a repeat test is r

## (undated) NOTE — Clinical Note
I recommended CANDICE (normal 2021) and UDS to assess possible DSD. Also brought up transfer of care to [facility]. Let me know your thoughts. Thanks.

## (undated) NOTE — LETTER
[facility], [address]   Date:   8/18/2023     Name:   Peng Shin    YOB: 1975   MRN:   [id number]       WHERE IS YOUR PAIN NOW? Jessica the areas on your body where you feel the described sensations. Use the appropriate symbol. Mary Tenorio the areas of radiation. Include all affected areas. Just to complete the picture, please draw in the face. ACHE:  ^ ^ ^   NUMBNESS:  0000   PINS & NEEDLES:  = = = =                              ^ ^ ^                       0000              = = = =                                    ^ ^ ^                       0000            = = = =      BURNING:  XXXX   STABBING: ////                  XXXX                ////                         XXXX          ////     Please jessica the line below indicating your degree of pain right now  with 0 being no pain 10 being the worst pain possible.                                          0             1             2              3             4              5              6              7             8             9             10         Patient Signature:

## (undated) NOTE — LETTER
[facility] Testing Department  Phone: [phone]  OUTSIDE TESTING RESULT REQUEST      TO:   Dr. Amy Jenkins Today's Date: 2/22/21    FAX #: [phone]     IMPORTANT: FOR YOUR IMMEDIATE ATTENTION  Please FAX all test results listed below t

## (undated) NOTE — LETTER
AUTHORIZATION FOR SURGICAL OPERATION OR OTHER PROCEDURE    1. I hereby authorize Dr. Wiley Escalante and the [facility] staff assigned to my case to perform the following operation and/or procedure at the [facility]:    Left hip joint steroid injection under US guidance     2.  My physician has explained the nature and purpose of the operation or other procedure, possible alternative methods of treatment, the risks involved, and the possibility of complication to me.  I acknowledge that no guarantee has been made as to the result that may be obtained.  3.  I recognize that, during the course of this operation, or other procedure, unforseen conditions may necessitate additional or different procedure than those listed above.  I, therefore, further authorize and request that the above named physician, his/her physician assistants or designees perform such procedures as are, in his/her professional opinion, necessary and desirable.  4.  Any tissue or organs removed in the operation or other procedure may be disposed of by and at the discretion of the [facility] staff and [facility].  5.  I understand that in the event of a medical emergency, I will be transported by local paramedics to [facility] or other hospital emergency department.  6.  I certify that I have read and fully understand the above consent to operation and/or other procedure.    7.  I acknowledge that my physician has explained sedation/analgesia administration to me including the risks and benefits.  I consent to the administration of sedation/analgesia as may be necessary or desirable in the judgement of my physician.    Witness signature: ___________________________________________________ Date:  ______/______/_____                    Time:  ________ A.M.  P.M.       Patient Name:  Arron Gutierrez  5/7/1975  [id number]         Patient signature:   ___________________________________________________                   Statement of Physician  My signature below affirms that prior to the time of the procedure, I have explained to the patient and/or his/her guardian, the risks and benefits involved in the proposed treatment and any reasonable alternative to the proposed treatment.  I have also explained the risks and benefits involved in the refusal of the proposed treatment and have answered the patient's questions.                        Date:  ______/______/_______  Provider                      Signature:  __________________________________________________________       Time:  ___________ ACHUYITA PEREA

## (undated) NOTE — LETTER
22      Jose Luis Jordan  :  1975      To Whom It May Concern: This patient was seen in our office on 22. I have seen him for chronic knee pain and symptoms of neuropathy. He should be allowed to use travel accommodations available to him while traveling through the airports in order to make it through the airport in a timely manner. If this office may be of further assistance, please do not hesitate to contact us.       Sincerely,        Nate Kraft DO

## (undated) NOTE — LETTER
AUTHORIZATION FOR SURGICAL OPERATION OR OTHER PROCEDURE    1. I hereby authorize Dr. Harleen Espinosa and the [facility] staff assigned to my case to perform the following operation and/or procedure at the [facility]:    Bilateral knee corticosteroid injections under ultrasound guidance     2. My physician has explained the nature and purpose of the operation or other procedure, possible alternative methods of treatment, the risks involved, and the possibility of complication to me. I acknowledge that no guarantee has been made as to the result that may be obtained. 3.  I recognize that, during the course of this operation, or other procedure, unforseen conditions may necessitate additional or different procedure than those listed above. I, therefore, further authorize and request that the above named physician, his/her physician assistants or designees perform such procedures as are, in his/her professional opinion, necessary and desirable. 4.  Any tissue or organs removed in the operation or other procedure may be disposed of by and at the discretion of the [facility] staff and [facility]. 5.  I understand that in the event of a medical emergency, I will be transported by local paramedics to [facility] or other hospital emergency department. 6.  I certify that I have read and fully understand the above consent to operation and/or other procedure. 7.  I acknowledge that my physician has explained sedation/analgesia administration to me including the risks and benefits. I consent to the administration of sedation/analgesia as may be necessary or desirable in the judgement of my physician. Witness signature: ___________________________________________________ Date:  ______/______/_____                    Time:  ________ A. M.  PEVELIN Rivera  5/7/1975  [id number]       Patient signature:  ___________________________________________________        Statement of Physician  My signature below affirms that prior to the time of the procedure, I have explained to the patient and/or his/her guardian, the risks and benefits involved in the proposed treatment and any reasonable alternative to the proposed treatment. I have also explained the risks and benefits involved in the refusal of the proposed treatment and have answered the patient's questions.                         Date:  ______/______/_______  Provider                      Signature:  __________________________________________________________       Time:  ___________ A.M    P.M.

## (undated) NOTE — LETTER
Patient Name: Shadi Hinojosa  YOB: 1975          MRN number:  [id number]  Date:  8/29/2017  Referring Physician:  Chico Santa     Progress Summary    Pt has attended 5, cancelled 1, and no shown 0 visits in Physical Therapy.      Pt has comp coming up in September. He may also benefit from continued PT after he has followed up with physician for recommendations.       Patient/Family/Caregiver was advised of these findings, precautions, and treatment options and has agreed to actively partici

## (undated) NOTE — LETTER
Date: 2024      Patient Name: Arron Gutierrez      : 1975        Thank you for choosing [facility] as your health care provider. Your physician has deemed the following medical service(s) necessary. However, your insurance plan may not pay for all of your health care and costs and may deny payment for this service. The fact that your insurance plan does not pay for an item or service does not mean you should not receive it. The purpose of this form is to help you make an informed decision about whether or not you want to receive this service(s) that may not be paid for by your insurance plan.    CPT Code Description     Cost     Left hip joint steroid injection under US guidance     _________ ______________________________ _____________      _________ ______________________________ _____________      I understand that the above mentioned service(s) or supply may not be covered by my insurance company. I agree to be financially responsible for the cost of this service or supply in the event of my insurance denies payment as a non-covered benefit.        ______________________________________________________________________  Signature of Patient or Patient's Representative  Relationship  Date    ______________________________________________________________________  Signature of Witness to signing of form   Printed Name

## (undated) NOTE — LETTER
[facility]   Date:   2/9/2022     Name:   Jose L Bustos    YOB: 1975   MRN:   [id number]       WHERE IS YOUR PAIN NOW? Jessica the areas on your body where you feel the described sensations. Use the appropriate symbol. Nancy Sickle the areas of radiation. Include all affected areas. Just to complete the picture, please draw in the face. ACHE:  ^ ^ ^   NUMBNESS:  0000   PINS & NEEDLES:  = = = =                              ^ ^ ^                       0000              = = = =                                    ^ ^ ^                       0000            = = = =      BURNING:  XXXX   STABBING: ////                  XXXX                ////                         XXXX          ////     Please jessica the line below indicating your degree of pain right now  with 0 being no pain 10 being the worst pain possible.                                          0             1             2              3             4              5              6              7             8             9             10         Patient Signature:

## (undated) NOTE — LETTER
Date: 10/28/2021    Patient Name: Anna Marie Phoenix          To Whom it may concern:     This letter has been written at the patient's request. The above patient was seen at the [facility] for right-sided thoracic back spasm and chronic righ

## (undated) NOTE — Clinical Note
EEG same as last time, shows some nonspecific sharp waves in the frontal area. Will discuss EEG and plan on follow up visit in Nov. For now continue Keppra at same dose.  Thanks

## (undated) NOTE — LETTER
21      RE: Steve Germain     : 1975  Dear Dr. Carolann Dawson,    This letter is to inform you that your patient is being scheduled for surgery with Dr. Boyd Sams on 3/11/21 at [facility]. We have asked the patient to contact your office to [email or web address]

## (undated) NOTE — LETTER
Date: March 10, 2023      Patient Name: Song Prince      : 1975        Thank you for choosing [facility] as your health care provider. Your physician has deemed the following medical service(s) necessary. However, your insurance plan may not pay for all of your health care and costs and may deny payment for this service. The fact that your insurance plan does not pay for an item or service does not mean you should not receive it. The purpose of this form is to help you make an informed decision about whether or not you want to receive this service(s) that may not be paid for by your insurance plan. CPT Code Description     Cost     Bilateral knee corticosteroid injections under ultrasound guidance     I understand that the above mentioned service(s) or supply may not be covered by my insurance company.  I agree to be financially responsible for the cost of this service or supply in the event of my insurance denies payment as a non-covered benefit.        ______________________________________________________________________  Signature of Patient or Patient's Representative  Relationship  Date    ______________________________________________________________________  Signature of Witness to signing of form   Printed Name

## (undated) NOTE — LETTER
Patient Name: Arron Gutierrez        : 1975       Medical Record #: [id number]    CONSENT FOR PROCEDURES/SEDATION    Date: 10/9/2024       Time: 11:19 AM        1. I authorize the performance upon Arron Gutierrez the following:    BILATERAL SHOULDER WITH GUIDED ULTRASOUND INJECTIONS  __________________________________________________________________________    2. I authorize Dr. HAMILTON (and whomever is designated as the doctor’s assistant), to perform the above mentioned procedures.    3. If any unforeseen conditions arise during this procedure calling for additional procedures, operations, or medications (including anesthesia and blood transfusion), I  further request and authorize the doctor to do whatever he/she deems advisable in my interest.    4. I consent to the taking and reproduction of any photographs in the course of this procedure for professional purposes.    5. I consent to the administration of such sedation as may be considered necessary or advisable by the physician responsible for this service, with the exception of  _____________________________.    6. I have been informed by my doctor of the nature and purpose of this procedure/sedation, possible alternative methods of treatment, risk involved and possible complications.      Signature of Patient:___________________________  DATE: 10/09/24    Signature of person authorized to consent for patient: Relationship to patient:  ___________________________    ___________________    Witness: ____________________     DATE: 10/09/24    Provider: ____________________     DATE: 10/09/24

## (undated) NOTE — LETTER
[facility]   Date:   11/9/2022     Name:   Farhan Reddy    YOB: 1975   MRN:   [id number]       WHERE IS YOUR PAIN NOW? Jessica the areas on your body where you feel the described sensations. Use the appropriate symbol. Zina Canal the areas of radiation. Include all affected areas. Just to complete the picture, please draw in the face. ACHE:  ^ ^ ^   NUMBNESS:  0000   PINS & NEEDLES:  = = = =                              ^ ^ ^                       0000              = = = =                                    ^ ^ ^                       0000            = = = =      BURNING:  XXXX   STABBING: ////                  XXXX                ////                         XXXX          ////     Please jessica the line below indicating your degree of pain right now  with 0 being no pain 10 being the worst pain possible.                                          0             1             2              3             4              5              6              7             8             9             10         Patient Signature:

## (undated) NOTE — LETTER
Arron Gutierrez  :  1975        To Whom It May Concern:     This patient was seen in our office on 10/09/2024. I have seen him for chronic back pain, hip pain, and shoulder pain in the setting of several other chronic debilitating medical conditions. He should be allowed to use travel accommodations available to him while traveling through the airports in order to make it through the airport in a timely manner.      If this office may be of further assistance, please do not hesitate to contact us.        Sincerely,      Jorge Toussaint DO, CAM   Primary Care Sports Medicine

## (undated) NOTE — LETTER
AUTHORIZATION FOR SURGICAL OPERATION OR OTHER PROCEDURE    1. I hereby authorize Dr. Ruth Kehr and the [facility] staff assigned to my case to perform the following operation and/or procedure at the [facility]:    Right subacromial bursa steroid injection under US guidance     2. My physician has explained the nature and purpose of the operation or other procedure, possible alternative methods of treatment, the risks involved, and the possibility of complication to me. I acknowledge that no guarantee has been made as to the result that may be obtained. 3.  I recognize that, during the course of this operation, or other procedure, unforseen conditions may necessitate additional or different procedure than those listed above. I, therefore, further authorize and request that the above named physician, his/her physician assistants or designees perform such procedures as are, in his/her professional opinion, necessary and desirable. 4.  Any tissue or organs removed in the operation or other procedure may be disposed of by and at the discretion of the [facility] staff and [facility]. 5.  I understand that in the event of a medical emergency, I will be transported by local paramedics to [facility] or other hospital emergency department. 6.  I certify that I have read and fully understand the above consent to operation and/or other procedure. 7.  I acknowledge that my physician has explained sedation/analgesia administration to me including the risks and benefits. I consent to the administration of sedation/analgesia as may be necessary or desirable in the judgement of my physician. Witness signature: ___________________________________________________ Date:  ______/______/_____                    Time:  ________ A. M.  P.M.        Patient Name:  Harriett Robles  5/7/1975  [id number]         Patient signature: ___________________________________________________                     Statement of Physician  My signature below affirms that prior to the time of the procedure, I have explained to the patient and/or his/her guardian, the risks and benefits involved in the proposed treatment and any reasonable alternative to the proposed treatment. I have also explained the risks and benefits involved in the refusal of the proposed treatment and have answered the patient's questions.                         Date:  ______/______/_______  Provider                      Signature:  __________________________________________________________       Time:  ___________ ACHUYITA PEREA

## (undated) NOTE — LETTER
WHERE IS YOUR PAIN NOW?  Jessica the areas on your body where you feel the described sensations.  Use the appropriate symbol.  Jessica the areas of radiation.  Include all affected areas.  Just to complete the picture, please draw in the face.     ACHE:  ^ ^ ^   NUMBNESS:  0000   PINS & NEEDLES:  = = = =                              ^ ^ ^                       0000              = = = =                                    ^ ^ ^                       0000            = = = =      BURNING:  XXXX   STABBING: ////                  XXXX                ////                         XXXX          ////     Please jessica the line below indicating your degree of pain right now  with 0 being no pain 10 being the worst pain possible.                                         0             1             2              3             4              5              6              7             8             9             10         Patient Signature: